# Patient Record
Sex: MALE | Race: WHITE | Employment: FULL TIME | URBAN - METROPOLITAN AREA
[De-identification: names, ages, dates, MRNs, and addresses within clinical notes are randomized per-mention and may not be internally consistent; named-entity substitution may affect disease eponyms.]

---

## 2020-03-24 ENCOUNTER — VIRTUAL VISIT (OUTPATIENT)
Dept: INTERNAL MEDICINE | Age: 50
End: 2020-03-24
Payer: COMMERCIAL

## 2020-03-24 VITALS — HEIGHT: 69 IN | BODY MASS INDEX: 30.51 KG/M2 | WEIGHT: 206 LBS

## 2020-03-24 PROBLEM — K76.89 OTHER CHRONIC NONALCOHOLIC LIVER DISEASE: Status: ACTIVE | Noted: 2020-03-24

## 2020-03-24 PROBLEM — G47.30 SLEEP APNEA: Status: ACTIVE | Noted: 2020-03-24

## 2020-03-24 PROBLEM — E66.9 OBESITY, CLASS I, BMI 30-34.9: Status: ACTIVE | Noted: 2018-04-10

## 2020-03-24 PROBLEM — J44.9 CHRONIC OBSTRUCTIVE AIRWAY DISEASE WITH ASTHMA (HCC): Status: ACTIVE | Noted: 2020-03-24

## 2020-03-24 PROBLEM — E78.2 MIXED HYPERLIPIDEMIA: Status: ACTIVE | Noted: 2020-03-24

## 2020-03-24 PROBLEM — K64.4 EXTERNAL HEMORRHOIDS: Status: ACTIVE | Noted: 2020-03-24

## 2020-03-24 PROBLEM — F33.0 MAJOR DEPRESSIVE DISORDER, RECURRENT EPISODE, MILD (HCC): Status: ACTIVE | Noted: 2020-03-24

## 2020-03-24 PROBLEM — I10 ESSENTIAL HYPERTENSION: Status: ACTIVE | Noted: 2020-03-24

## 2020-03-24 PROBLEM — R94.5 NONSPECIFIC ABNORMAL RESULTS OF LIVER FUNCTION STUDY: Status: ACTIVE | Noted: 2020-03-24

## 2020-03-24 PROCEDURE — 99443 PR PHYS/QHP TELEPHONE EVALUATION 21-30 MIN: CPT | Performed by: FAMILY MEDICINE

## 2020-03-24 RX ORDER — SELENIUM SULFIDE 2.5 MG/100ML
LOTION TOPICAL DAILY PRN
COMMUNITY
End: 2020-03-24 | Stop reason: SDUPTHER

## 2020-03-24 RX ORDER — SILDENAFIL 100 MG/1
100 TABLET, FILM COATED ORAL PRN
COMMUNITY
Start: 2019-03-28 | End: 2020-03-24 | Stop reason: SDUPTHER

## 2020-03-24 RX ORDER — SELENIUM SULFIDE 2.5 MG/100ML
LOTION TOPICAL DAILY PRN
Qty: 1 BOTTLE | Refills: 1 | Status: SHIPPED | OUTPATIENT
Start: 2020-03-24

## 2020-03-24 RX ORDER — VALACYCLOVIR HYDROCHLORIDE 1 G/1
1000 TABLET, FILM COATED ORAL 2 TIMES DAILY PRN
COMMUNITY

## 2020-03-24 RX ORDER — ALBUTEROL SULFATE 90 UG/1
AEROSOL, METERED RESPIRATORY (INHALATION)
COMMUNITY
Start: 2018-10-30 | End: 2020-07-13 | Stop reason: SDUPTHER

## 2020-03-24 RX ORDER — LISINOPRIL 5 MG/1
10 TABLET ORAL DAILY
COMMUNITY
Start: 2019-03-28 | End: 2020-03-24 | Stop reason: SDUPTHER

## 2020-03-24 RX ORDER — SILDENAFIL 100 MG/1
100 TABLET, FILM COATED ORAL PRN
Qty: 30 TABLET | Refills: 0 | Status: SHIPPED | OUTPATIENT
Start: 2020-03-24

## 2020-03-24 RX ORDER — LISINOPRIL 5 MG/1
10 TABLET ORAL DAILY
Qty: 180 TABLET | Refills: 0 | Status: SHIPPED | OUTPATIENT
Start: 2020-03-24

## 2020-03-24 NOTE — PATIENT INSTRUCTIONS
Hypertension / Hyperlipidemia Management    Blood Pressure Log      Tips to manage your condition    1. Keep your blood pressure below 130/80   Make sure your blood pressure is measured at every office visit    2. If you take antihypertensive drug therapy return for follow-up monthly until B/P goal is reached. 3. Follow-up with your physician to have your potassium and creatinine measured every 6 months. 4. Measure your blood pressure at home (use log to track your progress). 5. Keep your LDL (bad) cholesterol level below 130   Make sure your lipids are measured once a year     6. If you take LDL-lowering drug therapy return for follow-up every 6 months    Tips for healthy living    1. Start your day with breakfast  2. Exercise and slowly progress to (brisk walking, bike riding, etc) 30 minutes 3 to 5 days a week. 3. Snack in moderation (limit eating sugary or salty foods to no more than 3 times a week). 4. Eat more grains and vegetables (have no more than 3 servings of fruit daily). 5. Avoid tobacco use. 6. Drink alcohol in moderation (no more than 1 serving daily for woman and no more than 2 servings daily for men)  7. Obtain annual flu shot  8. Refer to patient education handouts given to you today. [facility] [place] Address Phone Website   [facility]  [address] [phone] [email or web address]       Weight Management Community Resources   Organization Address Phone Website   [facility] ([facility]) [address] [facility] [id number] [place] [address] [phone] n/a   Weight [employer] Multiple meeting locations throughout [address] [place] [address] [address] [email or web address]     Tops n/a n/a www. tops. [address], [facility] [address] [phone] [email or web address]    [facility] [address] [place] [address] [facility] [phone]

## 2020-03-24 NOTE — PROGRESS NOTES
connections     Talks on phone: Not on file     Gets together: Not on file     Attends [employer] service: Not on file     Active member of club or organization: Not on file     Attends meetings of clubs or organizations: Not on file     Relationship status: Not on file    Intimate partner violence     Fear of current or ex partner: Not on file     Emotionally abused: Not on file     Physically abused: Not on file     Forced sexual activity: Not on file   Other Topics Concern    Not on file   Social History Narrative    Not on file     Current Outpatient Medications on File Prior to Visit   Medication Sig Dispense Refill    albuterol sulfate  (90 Base) MCG/ACT inhaler USE 1-2 PUFFS EVERY 4 HOURS AS NEEDED      valACYclovir (VALTREX) 1 g tablet Take 1,000 mg by mouth 2 times daily as needed       No current facility-administered medications on file prior to visit. No Known Allergies    Chief Complaint   Patient presents with   [address]     previous pcp Dr. Mendez Heller    Diabetes     pt needs standing order for a1c and routing bw. HPI      Time spent with Patient: 15 minutes   Pt presents via telephonic contact due to COVID-19 pandemic emergency protocol. Patient was made aware they will be billed for telephone service. Patient is new to me  His last PCP retired  Needs to establish care    Diabetes: on metformin  HTN: lisinopril.  Home BP readings are 125/69, 140/86  HLD:not on statin   JAZZY: uses CPAP - needs an auto CPAP  Depression: not on any meds at this time  COPD:stable  NAFLD: stable  ED: stable  Seborrhea: stable    Problem list, PMHx, SHx, FHx, Medications: all reviewed and updated in EPIC    Current meds: reviewed & updated    He is scheduled for knee surgery with ortho   He has a torn meniscus     Review of Systems  Negative    Physical Exam  Patient was contacted over the phone  Patient is aware of today's time and date  Patient is aware of current pandemic situation

## 2020-04-23 ENCOUNTER — VIRTUAL VISIT (OUTPATIENT)
Dept: INTERNAL MEDICINE | Age: 50
End: 2020-04-23
Payer: COMMERCIAL

## 2020-04-23 ENCOUNTER — TELEPHONE (OUTPATIENT)
Dept: INTERNAL MEDICINE | Age: 50
End: 2020-04-23

## 2020-04-23 PROCEDURE — 99214 OFFICE O/P EST MOD 30 MIN: CPT | Performed by: FAMILY MEDICINE

## 2020-04-23 ASSESSMENT — ENCOUNTER SYMPTOMS
EYE ITCHING: 0
EYE DISCHARGE: 0
CHEST TIGHTNESS: 0
EYE PAIN: 0
APNEA: 0
CHOKING: 0

## 2020-04-23 NOTE — PROGRESS NOTES
compliant all of the time. Residual symptoms include: none. Home machine not working correctly    COPD:  Mild tobacco exposure  Lots of work exposure  He says his lungs are bad, also has hx of exercise induced asthma    ED  Stable  Doing well on medications  No med SE's    NAFLD  Stable  Due for labs    Seborrhea  Stable  Doing well on topical     Review of Systems   Constitutional: Negative for activity change, appetite change and chills. HENT: Negative for congestion, dental problem and drooling. Eyes: Negative for pain, discharge and itching. Respiratory: Negative for apnea, choking and chest tightness. Physical Exam  Nursing note reviewed. Constitutional:       General: He is not in acute distress. Appearance: Normal appearance. He is normal weight. He is not ill-appearing, toxic-appearing or diaphoretic. HENT:      Head: Normocephalic and atraumatic. Right Ear: External ear normal.      Left Ear: External ear normal.      Nose: Nose normal.   Eyes:      General: No scleral icterus. Right eye: No discharge. Left eye: No discharge. Extraocular Movements: Extraocular movements intact. Conjunctiva/sclera: Conjunctivae normal.   Neck:      Musculoskeletal: Normal range of motion. Pulmonary:      Effort: Pulmonary effort is normal.   Musculoskeletal: Normal range of motion. Neurological:      General: No focal deficit present. Mental Status: He is alert. Mental status is at baseline. Psychiatric:         Attention and Perception: Attention and perception normal.         Mood and Affect: Mood and affect normal.         Speech: Speech normal.         Behavior: Behavior normal. Behavior is cooperative. Thought Content:  Thought content normal.         Cognition and Memory: Memory normal.     Due to this being a TeleHealth encounter, evaluation of the following organ systems is limited:

## 2020-04-23 NOTE — TELEPHONE ENCOUNTER
Pt in need of new cpap machine, his old one is broken. He will call insurance to see if they require a certain DME company be used.

## 2020-07-13 RX ORDER — SITAGLIPTIN AND METFORMIN HYDROCHLORIDE 1000; 50 MG/1; MG/1
TABLET, FILM COATED ORAL
Qty: 90 TABLET | Refills: 1 | Status: SHIPPED | OUTPATIENT
Start: 2020-07-13

## 2020-07-13 RX ORDER — ALBUTEROL SULFATE 90 UG/1
AEROSOL, METERED RESPIRATORY (INHALATION)
Qty: 3 INHALER | Refills: 1 | Status: SHIPPED | OUTPATIENT
Start: 2020-07-13 | End: 2021-11-06 | Stop reason: SDUPTHER

## 2020-07-13 NOTE — TELEPHONE ENCOUNTER
requesting medication refill.  Please approve or deny this request.    Rx requested:  Requested Prescriptions     Pending Prescriptions Disp Refills    JANUMET  MG per tablet [Pharmacy Med Name: Velvet Mesarosia 50-1,000 MG TABLET] 90 tablet 1     Sig: take 1 tablet by mouth twice a day       Last Office Visit:   04/23/20    Last Labs:  04/23/20    Next Visit Date:  Future Appointments   Date Time Provider Sukhdev Levine   7/23/2020  1:00 PM Sherry Kaur MD [facility]

## 2020-07-13 NOTE — TELEPHONE ENCOUNTER
Requesting medication refill.  Please approve or deny this request.    Rx requested:  Requested Prescriptions     Pending Prescriptions Disp Refills    albuterol sulfate  (90 Base) MCG/ACT inhaler 1 Inhaler      Sig: USE 1-2 PUFFS EVERY 4 HOURS AS NEEDED       Last Office Visit:   4/23/20    Last Filled:  10/30/18    Last Labs:  4/15/2012    Next Visit Date:  Future Appointments   Date Time Provider Sukhdev Levine   7/23/2020  1:00 PM Emmy Loera MD [facility]

## 2020-07-22 RX ORDER — PIOGLITAZONEHYDROCHLORIDE 30 MG/1
30 TABLET ORAL DAILY
Qty: 90 TABLET | Refills: 0 | Status: SHIPPED | OUTPATIENT
Start: 2020-07-22

## 2020-07-22 NOTE — TELEPHONE ENCOUNTER
requesting medication refill. Please approve or deny this request.    Rx requested:  Requested Prescriptions     Pending Prescriptions Disp Refills    metFORMIN (GLUCOPHAGE) 500 MG tablet 180 tablet 1     Sig: Take 1 tablet by mouth 2 times daily (with meals)    pioglitazone (ACTOS) 30 MG tablet 90 tablet 1     Sig: Take 1 tablet by mouth daily       Last Office Visit:   04/23/20    Last Labs:      Next Visit Date:  No future appointments.         pt stated Janumet cost to much

## 2020-08-19 ENCOUNTER — TELEPHONE (OUTPATIENT)
Dept: INTERNAL MEDICINE | Age: 50
End: 2020-08-19

## 2021-11-06 ENCOUNTER — APPOINTMENT (OUTPATIENT)
Dept: CT IMAGING | Age: 51
End: 2021-11-06
Payer: COMMERCIAL

## 2021-11-06 ENCOUNTER — HOSPITAL ENCOUNTER (EMERGENCY)
Age: 51
Discharge: HOME | End: 2021-11-06
Attending: EMERGENCY MEDICINE
Payer: COMMERCIAL

## 2021-11-06 VITALS
DIASTOLIC BLOOD PRESSURE: 87 MMHG | HEART RATE: 108 BPM | SYSTOLIC BLOOD PRESSURE: 148 MMHG | RESPIRATION RATE: 20 BRPM | BODY MASS INDEX: 30.81 KG/M2 | OXYGEN SATURATION: 96 % | TEMPERATURE: 98 F | HEIGHT: 69 IN | WEIGHT: 208 LBS

## 2021-11-06 DIAGNOSIS — R79.89 ABNORMAL LFTS: ICD-10-CM

## 2021-11-06 DIAGNOSIS — J18.9 PNEUMONIA OF UPPER LOBE DUE TO INFECTIOUS ORGANISM, UNSPECIFIED LATERALITY: Primary | ICD-10-CM

## 2021-11-06 LAB
ALBUMIN SERPL-MCNC: 3.7 G/DL (ref 3.5–4.6)
ALP BLD-CCNC: 205 U/L (ref 35–104)
ALT SERPL-CCNC: 94 U/L (ref 0–41)
ANION GAP SERPL CALCULATED.3IONS-SCNC: 20 MEQ/L (ref 9–15)
APTT: 41.4 SEC (ref 24.4–36.8)
AST SERPL-CCNC: 96 U/L (ref 0–40)
BASOPHILS ABSOLUTE: 0 K/UL (ref 0–0.1)
BASOPHILS RELATIVE PERCENT: 0.6 % (ref 0.2–1.2)
BILIRUB SERPL-MCNC: 1 MG/DL (ref 0.2–0.7)
BILIRUBIN URINE: NEGATIVE
BLOOD, URINE: NEGATIVE
BUN BLDV-MCNC: 12 MG/DL (ref 6–20)
CALCIUM SERPL-MCNC: 9.8 MG/DL (ref 8.5–9.9)
CHLORIDE BLD-SCNC: 94 MEQ/L (ref 95–107)
CLARITY: CLEAR
CO2: 15 MEQ/L (ref 20–31)
COLOR: YELLOW
CREAT SERPL-MCNC: 0.83 MG/DL (ref 0.7–1.2)
EKG ATRIAL RATE: 116 BPM
EKG P AXIS: 63 DEGREES
EKG P-R INTERVAL: 156 MS
EKG Q-T INTERVAL: 356 MS
EKG QRS DURATION: 92 MS
EKG QTC CALCULATION (BAZETT): 494 MS
EKG R AXIS: 35 DEGREES
EKG T AXIS: 81 DEGREES
EKG VENTRICULAR RATE: 116 BPM
EOSINOPHILS ABSOLUTE: 0.1 K/UL (ref 0–0.5)
EOSINOPHILS RELATIVE PERCENT: 1.3 % (ref 0.8–7)
GFR AFRICAN AMERICAN: >60
GFR NON-AFRICAN AMERICAN: >60
GLOBULIN: 3.7 G/DL (ref 2.3–3.5)
GLUCOSE BLD-MCNC: 233 MG/DL (ref 70–99)
GLUCOSE URINE: 500 MG/DL
HCT VFR BLD CALC: 37.4 % (ref 42–52)
HEMOGLOBIN: 12.7 G/DL (ref 13.7–17.5)
IMMATURE GRANULOCYTES #: 0 K/UL
IMMATURE GRANULOCYTES %: 0.4 %
INR BLD: 1.4
KETONES, URINE: 15 MG/DL
LEUKOCYTE ESTERASE, URINE: NEGATIVE
LYMPHOCYTES ABSOLUTE: 1.7 K/UL (ref 1.3–3.6)
LYMPHOCYTES RELATIVE PERCENT: 30.3 %
MCH RBC QN AUTO: 30.2 PG (ref 25.7–32.2)
MCHC RBC AUTO-ENTMCNC: 34 % (ref 32.3–36.5)
MCV RBC AUTO: 88.8 FL (ref 79–92.2)
MONOCYTES ABSOLUTE: 0.5 K/UL (ref 0.3–0.8)
MONOCYTES RELATIVE PERCENT: 9.9 % (ref 5.3–12.2)
NEUTROPHILS ABSOLUTE: 3.1 K/UL (ref 1.8–5.4)
NEUTROPHILS RELATIVE PERCENT: 57.5 % (ref 34–67.9)
NITRITE, URINE: NEGATIVE
PDW BLD-RTO: 12.1 % (ref 11.6–14.4)
PH UA: 5 (ref 5–9)
PLATELET # BLD: 115 K/UL (ref 163–337)
POTASSIUM REFLEX MAGNESIUM: 4.2 MEQ/L (ref 3.4–4.9)
PROTEIN UA: NEGATIVE MG/DL
PROTHROMBIN TIME: 16.8 SEC (ref 12.3–14.9)
RBC # BLD: 4.21 M/UL (ref 4.63–6.08)
SARS-COV-2, NAAT: NOT DETECTED
SODIUM BLD-SCNC: 129 MEQ/L (ref 135–144)
SPECIFIC GRAVITY UA: 1.02 (ref 1–1.03)
TOTAL PROTEIN: 7.4 G/DL (ref 6.3–8)
TROPONIN: <0.01 NG/ML (ref 0–0.01)
UROBILINOGEN, URINE: 0.2 E.U./DL
WBC # BLD: 5.4 K/UL (ref 4.2–9)

## 2021-11-06 PROCEDURE — 99283 EMERGENCY DEPT VISIT LOW MDM: CPT

## 2021-11-06 PROCEDURE — 87635 SARS-COV-2 COVID-19 AMP PRB: CPT

## 2021-11-06 PROCEDURE — 84484 ASSAY OF TROPONIN QUANT: CPT

## 2021-11-06 PROCEDURE — 6360000002 HC RX W HCPCS: Performed by: EMERGENCY MEDICINE

## 2021-11-06 PROCEDURE — 6360000004 HC RX CONTRAST MEDICATION: Performed by: EMERGENCY MEDICINE

## 2021-11-06 PROCEDURE — 94640 AIRWAY INHALATION TREATMENT: CPT

## 2021-11-06 PROCEDURE — 85610 PROTHROMBIN TIME: CPT

## 2021-11-06 PROCEDURE — 87040 BLOOD CULTURE FOR BACTERIA: CPT

## 2021-11-06 PROCEDURE — 96366 THER/PROPH/DIAG IV INF ADDON: CPT

## 2021-11-06 PROCEDURE — 93005 ELECTROCARDIOGRAM TRACING: CPT

## 2021-11-06 PROCEDURE — 71275 CT ANGIOGRAPHY CHEST: CPT

## 2021-11-06 PROCEDURE — 85025 COMPLETE CBC W/AUTO DIFF WBC: CPT

## 2021-11-06 PROCEDURE — 80053 COMPREHEN METABOLIC PANEL: CPT

## 2021-11-06 PROCEDURE — 36415 COLL VENOUS BLD VENIPUNCTURE: CPT

## 2021-11-06 PROCEDURE — 2580000003 HC RX 258: Performed by: EMERGENCY MEDICINE

## 2021-11-06 PROCEDURE — 96365 THER/PROPH/DIAG IV INF INIT: CPT

## 2021-11-06 PROCEDURE — 93010 ELECTROCARDIOGRAM REPORT: CPT | Performed by: INTERNAL MEDICINE

## 2021-11-06 PROCEDURE — 81003 URINALYSIS AUTO W/O SCOPE: CPT

## 2021-11-06 PROCEDURE — 85730 THROMBOPLASTIN TIME PARTIAL: CPT

## 2021-11-06 RX ORDER — 0.9 % SODIUM CHLORIDE 0.9 %
1000 INTRAVENOUS SOLUTION INTRAVENOUS ONCE
Status: COMPLETED | OUTPATIENT
Start: 2021-11-06 | End: 2021-11-06

## 2021-11-06 RX ORDER — LEVOFLOXACIN 5 MG/ML
750 INJECTION, SOLUTION INTRAVENOUS ONCE
Status: COMPLETED | OUTPATIENT
Start: 2021-11-06 | End: 2021-11-06

## 2021-11-06 RX ORDER — ALBUTEROL SULFATE 90 UG/1
2 AEROSOL, METERED RESPIRATORY (INHALATION) 4 TIMES DAILY PRN
Qty: 54 G | Refills: 1 | Status: SHIPPED | OUTPATIENT
Start: 2021-11-06

## 2021-11-06 RX ORDER — ALBUTEROL SULFATE 2.5 MG/3ML
2.5 SOLUTION RESPIRATORY (INHALATION) ONCE
Status: COMPLETED | OUTPATIENT
Start: 2021-11-06 | End: 2021-11-06

## 2021-11-06 RX ORDER — LEVOFLOXACIN 750 MG/1
750 TABLET ORAL DAILY
Qty: 10 TABLET | Refills: 0 | Status: SHIPPED | OUTPATIENT
Start: 2021-11-06 | End: 2021-11-16

## 2021-11-06 RX ADMIN — LEVOFLOXACIN 750 MG: 5 INJECTION, SOLUTION INTRAVENOUS at 10:06

## 2021-11-06 RX ADMIN — ALBUTEROL SULFATE 2.5 MG: 2.5 SOLUTION RESPIRATORY (INHALATION) at 10:05

## 2021-11-06 RX ADMIN — IOPAMIDOL 100 ML: 755 INJECTION, SOLUTION INTRAVENOUS at 09:00

## 2021-11-06 RX ADMIN — SODIUM CHLORIDE 1000 ML: 9 INJECTION, SOLUTION INTRAVENOUS at 07:59

## 2021-11-06 NOTE — ED PROVIDER NOTES
eMERGENCY dEPARTMENT eNCOUnter      [address] [facility]    Chief Complaint   Patient presents with    Hemoptysis     C/O for the past couple months that comes and goes. Typically a dry cough but now coughing up blood. HPI    Judy Adames is a 46 y.o. male with history of diabetes, COPD, hypertension, depression, sleep apnea who presentsto ED from home  By private car  With complaint of cough, hemoptysis, chest burning  Onset 5 AM  Intensity of symptoms moderate  Patient has got history of intermittent cough going on for years. Patient started coughing earlier this morning and noticed some blood in the sputum. Sputum was about a spoonful which stopped. Patient does not take any anticoagulation. Patient is not a smoker he denies any fever chills. Patient is not Covid vaccinated. Patient had some left-sided chest burning with the cough. PAST MEDICAL HISTORY    Past Medical History:   Diagnosis Date    Tinea versicolor        SURGICAL HISTORY    History reviewed. No pertinent surgical history. CURRENT MEDICATIONS    Current Outpatient Rx   Medication Sig Dispense Refill    albuterol sulfate HFA (VENTOLIN HFA) 108 (90 Base) MCG/ACT inhaler Inhale 2 puffs into the lungs 4 times daily as needed for Wheezing 54 g 1    metFORMIN (GLUCOPHAGE) 500 MG tablet Take 1 tablet by mouth 2 times daily (with meals) 180 tablet 0    pioglitazone (ACTOS) 30 MG tablet Take 1 tablet by mouth daily 90 tablet 0    valACYclovir (VALTREX) 1 g tablet Take 1,000 mg by mouth 2 times daily as needed      sildenafil (VIAGRA) 100 MG tablet Take 1 tablet by mouth as needed for Erectile Dysfunction 30 tablet 0    JANUMET  MG per tablet take 1 tablet by mouth twice a day 90 tablet 1    lisinopril (PRINIVIL;ZESTRIL) 5 MG tablet Take 2 tablets by mouth daily 180 tablet 0    selenium sulfide (SELSUN) 2.5 % lotion Apply topically daily as needed for Itching Apply topically daily as needed.  1 Bottle 1       ALLERGIES    No Known Allergies    FAMILY HISTORY    History reviewed. No pertinent family history. SOCIAL HISTORY    Social History     Socioeconomic History    Marital status: Single     Spouse name: None    Number of children: None    Years of education: None    Highest education level: None   Occupational History    None   Tobacco Use    Smoking status: Former Smoker     Packs/day: 0.25     Years: 5.00     Pack years: 1.25     Types: Cigarettes    Smokeless tobacco: Former User   Vaping Use    Vaping Use: Never used   Substance and Sexual Activity    Alcohol use: Not Currently    Drug use: Never    Sexual activity: None   Other Topics Concern    None   Social History Narrative    None     Social Determinants of Health     Financial Resource Strain:     Difficulty of Paying Living Expenses: Not on file   Food Insecurity:     Worried About Running Out of Food in the Last Year: Not on file    Meenakshi of Food in the Last Year: Not on file   Transportation Needs:     Lack of Transportation (Medical): Not on file    Lack of Transportation (Non-Medical):  Not on file   Physical Activity:     Days of Exercise per Week: Not on file    Minutes of Exercise per Session: Not on file   Stress:     Feeling of Stress : Not on file   Social Connections:     Frequency of Communication with Friends and Family: Not on file    Frequency of Social Gatherings with Friends and Family: Not on file    Attends Congregational Services: Not on file    Active Member of Clubs or Organizations: Not on file    Attends Club or Organization Meetings: Not on file    Marital Status: Not on file   Intimate Partner Violence:     Fear of Current or Ex-Partner: Not on file    Emotionally Abused: Not on file    Physically Abused: Not on file    Sexually Abused: Not on file   Housing Stability:     Unable to Pay for Housing in the Last Year: Not on file    Number of [place] in the Last Year: Not on file    Unstable Housing in the Last Year: Not on file       REVIEW OF SYSTEMS    Constitutional:  Denies fever, chills, weight loss or weakness   Eyes:  Denies photophobia or discharge   HENT:  Denies sore throat or ear pain   Respiratory: Complains of cough but denies shortness of breath   Cardiovascular: Complains of chest burning and, palpitations but denies swelling   GI:  Denies abdominal pain, nausea, vomiting, or diarrhea   Musculoskeletal:  Denies back pain   Skin:  Denies rash   Neurologic:  Denies headache, focal weakness or sensory changes   Endocrine:  Denies polyuria or polydypsia   Lymphatic:  Denies swollen glands   Psychiatric:  Denies depression, suicidal ideation or homicidal ideation   All systems negative except as marked. PHYSICAL EXAM    VITAL SIGNS: BP (!) 148/87   Pulse 108   Temp 98 °F (36.7 °C) (Oral)   Resp 20   Ht 5' 9\" (1.753 m)   Wt 208 lb (94.3 kg)   SpO2 96%   BMI 30.72 kg/m²    Constitutional: Morbidly obese, No acute distress, Non-toxic appearance. HENT:  Normocephalic, Atraumatic, Bilateral external ears normal, Oropharynx moist, No oral exudates, Nose normal. Neck- Normal range of motion, No tenderness, Supple, No stridor. Eyes:  PERRL, EOMI, Conjunctiva normal, No discharge. Respiratory:  Normal breath sounds, mild respiratory distress, mild diffuse wheezing, No chest tenderness. Cardiovascular: Tachycardic, normal rhythm, No murmurs, No rubs, No gallops. GI:  Bowel sounds normal, Soft, No tenderness, No masses, No pulsatile masses. : No CVA tenderness. Musculoskeletal:  Intact distal pulses, No edema, No tenderness, No cyanosis, No clubbing. Good range of motion in all major joints. No tenderness to palpation or major deformities noted. Back- No tenderness. Integument:  Warm, Dry, No erythema, No rash. Lymphatic:  No lymphadenopathy noted. Neurologic:  Alert & oriented x 3, Normal motor function, Normal sensory function, No focal deficits noted.    Psychiatric:  Affect anxious, Judgment normal, Mood normal.     EKG    Sinus Tachycardia,  , Normal Axis, Non specificT wave changes, QTc 494    RADIOLOGY    CTA CHEST W WO CONTRAST - r/o Pulmonary Embolism   Final Result      1. No evidence for pulmonary embolism. 2.Bilateral upper zone predominant airspace opacities, suggestive of multifocal pneumonia, including viral etiologies. REEVALUATION   Patient was updated the results of labs and Radiology.     Note  Labs  Labs Reviewed   CBC WITH AUTO DIFFERENTIAL - Abnormal; Notable for the following components:       Result Value    RBC 4.21 (*)     Hemoglobin 12.7 (*)     Hematocrit 37.4 (*)     Platelets 199 (*)     All other components within normal limits   COMPREHENSIVE METABOLIC PANEL W/ REFLEX TO MG FOR LOW K - Abnormal; Notable for the following components:    Sodium 129 (*)     Chloride 94 (*)     CO2 15 (*)     Anion Gap 20 (*)     Glucose 233 (*)     Total Bilirubin 1.0 (*)     Alkaline Phosphatase 205 (*)     ALT 94 (*)     AST 96 (*)     Globulin 3.7 (*)     All other components within normal limits   PROTIME-INR - Abnormal; Notable for the following components:    Protime 16.8 (*)     All other components within normal limits   APTT - Abnormal; Notable for the following components:    aPTT 41.4 (*)     All other components within normal limits   URINALYSIS - Abnormal; Notable for the following components:    Glucose, Ur 500 (*)     Ketones, Urine 15 (*)     All other components within normal limits   COVID-19, RAPID   CULTURE, BLOOD 1    Narrative:     ORDER#: [id number]                          ORDERED BY: ANN MIN  SOURCE: Blood                              COLLECTED:  11/06/21 08:09  ANTIBIOTICS AT AILEEN.:                      RECEIVED :  11/06/21 14:22   CULTURE, BLOOD 2    Narrative:     ORDER#: [id number]                          ORDERED BY: Libby Burnett  SOURCE: Blood                              COLLECTED:  11/06/21 08:09  ANTIBIOTICS AT AILEEN.: RECEIVED :  11/06/21 14:20   TROPONIN             Summation      Patient Course:     ED Medications administered this visit:    Medications   0.9 % sodium chloride bolus (0 mLs IntraVENous Stopped 11/6/21 0919)   albuterol (PROVENTIL) nebulizer solution 2.5 mg (2.5 mg Nebulization Given 11/6/21 1005)   iopamidol (ISOVUE-370) 76 % injection 100 mL (100 mLs IntraVENous Given 11/6/21 0900)   levoFLOXacin (LEVAQUIN) 750 MG/150ML infusion 750 mg (0 mg IntraVENous Stopped 11/6/21 1145)       New Prescriptions from this visit:    Discharge Medication List as of 11/6/2021 10:05 AM      START taking these medications    Details   levoFLOXacin (LEVAQUIN) 750 MG tablet Take 1 tablet by mouth daily for 10 days, Disp-10 tablet, R-0Print      albuterol sulfate HFA (VENTOLIN HFA) 108 (90 Base) MCG/ACT inhaler Inhale 2 puffs into the lungs 4 times daily as needed for Wheezing, Disp-54 g, R-1Print             Follow-up:  Khushbu Jc MD  [id number] [phone]    Call in 1 day      Sarkis Connors MD  [address] [address]  [address] [facility]  [phone]    Call in 1 day          Final Impression:   1. Pneumonia of upper lobe due to infectious organism, unspecified laterality    2.  Abnormal LFTs               (Please note that portions of this note were completed with a voice recognition program.  Efforts were made to edit the dictations but occasionally words are mis-transcribed.)          Key Adrian MD  11/17/21 [id number]

## 2021-11-11 LAB
BLOOD CULTURE, ROUTINE: NORMAL
CULTURE, BLOOD 2: NORMAL

## 2022-07-31 ENCOUNTER — OFFICE VISIT (OUTPATIENT)
Dept: INTERNAL MEDICINE | Age: 52
End: 2022-07-31
Payer: COMMERCIAL

## 2022-07-31 VITALS
WEIGHT: 206 LBS | TEMPERATURE: 98.4 F | HEART RATE: 91 BPM | BODY MASS INDEX: 30.51 KG/M2 | HEIGHT: 69 IN | OXYGEN SATURATION: 98 % | SYSTOLIC BLOOD PRESSURE: 118 MMHG | DIASTOLIC BLOOD PRESSURE: 68 MMHG

## 2022-07-31 DIAGNOSIS — H10.9 CONJUNCTIVITIS OF RIGHT EYE, UNSPECIFIED CONJUNCTIVITIS TYPE: Primary | ICD-10-CM

## 2022-07-31 PROCEDURE — 99213 OFFICE O/P EST LOW 20 MIN: CPT | Performed by: NURSE PRACTITIONER

## 2022-07-31 SDOH — ECONOMIC STABILITY: FOOD INSECURITY: WITHIN THE PAST 12 MONTHS, THE FOOD YOU BOUGHT JUST DIDN'T LAST AND YOU DIDN'T HAVE MONEY TO GET MORE.: NEVER TRUE

## 2022-07-31 SDOH — ECONOMIC STABILITY: FOOD INSECURITY: WITHIN THE PAST 12 MONTHS, YOU WORRIED THAT YOUR FOOD WOULD RUN OUT BEFORE YOU GOT MONEY TO BUY MORE.: NEVER TRUE

## 2022-07-31 ASSESSMENT — PATIENT HEALTH QUESTIONNAIRE - PHQ9
3. TROUBLE FALLING OR STAYING ASLEEP: 0
10. IF YOU CHECKED OFF ANY PROBLEMS, HOW DIFFICULT HAVE THESE PROBLEMS MADE IT FOR YOU TO DO YOUR WORK, TAKE CARE OF THINGS AT HOME, OR GET ALONG WITH OTHER PEOPLE: 0
9. THOUGHTS THAT YOU WOULD BE BETTER OFF DEAD, OR OF HURTING YOURSELF: 0
2. FEELING DOWN, DEPRESSED OR HOPELESS: 0
SUM OF ALL RESPONSES TO PHQ QUESTIONS 1-9: 0
SUM OF ALL RESPONSES TO PHQ QUESTIONS 1-9: 0
1. LITTLE INTEREST OR PLEASURE IN DOING THINGS: 0
6. FEELING BAD ABOUT YOURSELF - OR THAT YOU ARE A FAILURE OR HAVE LET YOURSELF OR YOUR FAMILY DOWN: 0
4. FEELING TIRED OR HAVING LITTLE ENERGY: 0
8. MOVING OR SPEAKING SO SLOWLY THAT OTHER PEOPLE COULD HAVE NOTICED. OR THE OPPOSITE, BEING SO FIGETY OR RESTLESS THAT YOU HAVE BEEN MOVING AROUND A LOT MORE THAN USUAL: 0
SUM OF ALL RESPONSES TO PHQ9 QUESTIONS 1 & 2: 0
7. TROUBLE CONCENTRATING ON THINGS, SUCH AS READING THE NEWSPAPER OR WATCHING TELEVISION: 0
SUM OF ALL RESPONSES TO PHQ QUESTIONS 1-9: 0
SUM OF ALL RESPONSES TO PHQ QUESTIONS 1-9: 0
5. POOR APPETITE OR OVEREATING: 0

## 2022-07-31 ASSESSMENT — ENCOUNTER SYMPTOMS
EYE REDNESS: 1
PHOTOPHOBIA: 1
WHEEZING: 0
EYE DISCHARGE: 1
SINUS PRESSURE: 0
EYE PAIN: 1
SHORTNESS OF BREATH: 0

## 2022-07-31 ASSESSMENT — SOCIAL DETERMINANTS OF HEALTH (SDOH): HOW HARD IS IT FOR YOU TO PAY FOR THE VERY BASICS LIKE FOOD, HOUSING, MEDICAL CARE, AND HEATING?: NOT HARD AT ALL

## 2022-07-31 NOTE — PROGRESS NOTES
Amanda Patel (:  1970) is a 46 y.o. male, Established patient, here for evaluation of the following chief complaint(s): Other (Right pink eye)      Vitals:    22 1326   BP: 118/68   Pulse: 91   Temp: 98.4 °F (36.9 °C)   SpO2: 98%       ASSESSMENT/PLAN:  1. Conjunctivitis of right eye, unspecified conjunctivitis type        -   continue with abx PCP gave        -   Avoid hot water shower to the eye        -   Cool compress to the eye 2-3x daily        -   Keep Wednesday appt with PCP         -   Gentle wash to the eye with mild soap or plain cool water    Return in about 1 week (around 2022) for follow up with PCP. SUBJECTIVE/OBJECTIVE:    Conjunctivitis   The current episode started 2 days ago (pt woke up with yellow crusts with some mucus in the right eye a few days ago. tried old Rx of Lotemax without relief, has been letting warm/hot water run in it in the shower with temp soothes eye. PCP gave him abx Sulfacetamine gtts, has used only 1 day). The onset was sudden. Associated symptoms include photophobia, eye discharge, eye pain (minimal) and eye redness (severe with blood). Pertinent negatives include no ear pain and no wheezing. The eye pain is mild. The right eye is affected. The eye pain is not associated with movement. The eyelid exhibits swelling and redness. Review of Systems   HENT:  Negative for ear pain and sinus pressure. Eyes:  Positive for photophobia, pain (minimal), discharge and redness (severe with blood). Negative for visual disturbance. Respiratory:  Negative for shortness of breath and wheezing. Cardiovascular:  Negative for palpitations. Neurological:  Negative for dizziness and light-headedness. Physical Exam  Vitals reviewed. Constitutional:       General: He is not in acute distress. Appearance: Normal appearance.    HENT:      Right Ear: Tympanic membrane normal.      Left Ear: Tympanic membrane normal.      Nose: Nose normal. Mouth/Throat:      Lips: Pink. Mouth: Mucous membranes are moist.   Eyes:      General: Lids are normal.         Left eye: Discharge present. Extraocular Movements: Extraocular movements intact. Conjunctiva/sclera:      Left eye: Left conjunctiva is injected (with serosanguinous fluid noted inside the lower eyelid). Pupils: Pupils are equal, round, and reactive to light. Cardiovascular:      Rate and Rhythm: Normal rate and regular rhythm. Heart sounds: Normal heart sounds, S1 normal and S2 normal.   Pulmonary:      Effort: Pulmonary effort is normal. No respiratory distress. Breath sounds: Normal air entry. Skin:     General: Skin is warm and dry. Neurological:      Mental Status: He is alert and oriented to person, place, and time. Psychiatric:         Behavior: Behavior is cooperative. An electronic signature was used to authenticate this note.     --CARLI Samson

## 2023-03-27 DIAGNOSIS — E11.9 DIABETES MELLITUS WITHOUT COMPLICATION (MULTI): ICD-10-CM

## 2023-03-27 RX ORDER — BLOOD SUGAR DIAGNOSTIC
60 STRIP MISCELLANEOUS
COMMUNITY
Start: 2020-07-13 | End: 2023-03-27 | Stop reason: SDUPTHER

## 2023-03-27 RX ORDER — BLOOD SUGAR DIAGNOSTIC
60 STRIP MISCELLANEOUS
Qty: 200 STRIP | Refills: 3 | Status: SHIPPED | OUTPATIENT
Start: 2023-03-27 | End: 2023-10-13 | Stop reason: ALTCHOICE

## 2023-04-28 ENCOUNTER — OFFICE VISIT (OUTPATIENT)
Dept: PRIMARY CARE | Facility: CLINIC | Age: 53
End: 2023-04-28
Payer: COMMERCIAL

## 2023-04-28 VITALS — BODY MASS INDEX: 30.66 KG/M2 | TEMPERATURE: 96.5 F | WEIGHT: 207 LBS | HEIGHT: 69 IN

## 2023-04-28 DIAGNOSIS — I25.10 ATHEROSCLEROSIS OF NATIVE CORONARY ARTERY OF NATIVE HEART WITHOUT ANGINA PECTORIS: Primary | ICD-10-CM

## 2023-04-28 DIAGNOSIS — I10 ESSENTIAL HYPERTENSION: ICD-10-CM

## 2023-04-28 DIAGNOSIS — D50.0 IRON DEFICIENCY ANEMIA DUE TO CHRONIC BLOOD LOSS: ICD-10-CM

## 2023-04-28 DIAGNOSIS — K74.69 OTHER CIRRHOSIS OF LIVER (MULTI): ICD-10-CM

## 2023-04-28 DIAGNOSIS — E11.9 CONTROLLED TYPE 2 DIABETES MELLITUS WITHOUT COMPLICATION, WITHOUT LONG-TERM CURRENT USE OF INSULIN (MULTI): ICD-10-CM

## 2023-04-28 DIAGNOSIS — I85.11 SECONDARY ESOPHAGEAL VARICES WITH BLEEDING (MULTI): ICD-10-CM

## 2023-04-28 PROBLEM — K74.60 CIRRHOSIS OF LIVER (MULTI): Status: ACTIVE | Noted: 2023-04-28

## 2023-04-28 PROBLEM — I85.00 ESOPHAGEAL VARICES (MULTI): Status: ACTIVE | Noted: 2023-04-28

## 2023-04-28 PROBLEM — K76.6 PORTAL HYPERTENSION (MULTI): Status: ACTIVE | Noted: 2023-04-24

## 2023-04-28 PROBLEM — K76.0 NAFLD (NONALCOHOLIC FATTY LIVER DISEASE): Status: ACTIVE | Noted: 2023-04-28

## 2023-04-28 PROBLEM — G47.33 OSA ON CPAP: Status: ACTIVE | Noted: 2023-04-28

## 2023-04-28 PROBLEM — D64.9 ANEMIA: Status: ACTIVE | Noted: 2023-04-23

## 2023-04-28 PROBLEM — F51.04 CHRONIC INSOMNIA: Status: ACTIVE | Noted: 2023-04-28

## 2023-04-28 PROBLEM — E03.9 HYPOTHYROIDISM: Status: ACTIVE | Noted: 2023-04-13

## 2023-04-28 PROBLEM — E78.2 MIXED HYPERLIPIDEMIA: Status: ACTIVE | Noted: 2020-03-24

## 2023-04-28 PROCEDURE — 1036F TOBACCO NON-USER: CPT | Performed by: INTERNAL MEDICINE

## 2023-04-28 PROCEDURE — 99214 OFFICE O/P EST MOD 30 MIN: CPT | Performed by: INTERNAL MEDICINE

## 2023-04-28 PROCEDURE — 3008F BODY MASS INDEX DOCD: CPT | Performed by: INTERNAL MEDICINE

## 2023-04-28 RX ORDER — NITROGLYCERIN 0.4 MG/1
TABLET SUBLINGUAL
COMMUNITY
Start: 2021-12-10

## 2023-04-28 RX ORDER — SPIRONOLACTONE 100 MG/1
100 TABLET, FILM COATED ORAL
COMMUNITY
Start: 2023-04-26 | End: 2023-05-30 | Stop reason: SDUPTHER

## 2023-04-28 RX ORDER — DOXEPIN HYDROCHLORIDE 10 MG/1
CAPSULE ORAL
COMMUNITY
Start: 2022-08-03 | End: 2023-10-13 | Stop reason: ALTCHOICE

## 2023-04-28 RX ORDER — METFORMIN HYDROCHLORIDE 1000 MG/1
1000 TABLET ORAL 2 TIMES DAILY
COMMUNITY
Start: 2019-10-29 | End: 2023-05-12 | Stop reason: SDUPTHER

## 2023-04-28 RX ORDER — FUROSEMIDE 40 MG/1
40 TABLET ORAL
COMMUNITY
Start: 2023-04-26 | End: 2023-05-30 | Stop reason: ALTCHOICE

## 2023-04-28 RX ORDER — LISINOPRIL 5 MG/1
2 TABLET ORAL DAILY
COMMUNITY
Start: 2020-03-24 | End: 2023-04-28 | Stop reason: ALTCHOICE

## 2023-04-28 RX ORDER — ATORVASTATIN CALCIUM 20 MG/1
20 TABLET, FILM COATED ORAL
COMMUNITY
Start: 2021-12-07 | End: 2024-03-29 | Stop reason: SDUPTHER

## 2023-04-28 RX ORDER — PANTOPRAZOLE SODIUM 40 MG/1
40 TABLET, DELAYED RELEASE ORAL
COMMUNITY
Start: 2023-04-07 | End: 2023-10-13 | Stop reason: ALTCHOICE

## 2023-04-28 RX ORDER — BLOOD SUGAR DIAGNOSTIC
1 STRIP MISCELLANEOUS 2 TIMES DAILY
COMMUNITY
Start: 2020-07-13 | End: 2023-07-12

## 2023-04-28 RX ORDER — VIT C/E/ZN/COPPR/LUTEIN/ZEAXAN 250MG-90MG
1000 CAPSULE ORAL
COMMUNITY
Start: 2016-01-12 | End: 2023-10-13 | Stop reason: ALTCHOICE

## 2023-04-28 RX ORDER — PIOGLITAZONEHYDROCHLORIDE 15 MG/1
1 TABLET ORAL DAILY
COMMUNITY
Start: 2021-01-22 | End: 2023-08-15 | Stop reason: ALTCHOICE

## 2023-04-28 RX ORDER — ALBUTEROL SULFATE 90 UG/1
AEROSOL, METERED RESPIRATORY (INHALATION)
COMMUNITY
Start: 2020-07-13

## 2023-04-28 RX ORDER — INSULIN DEGLUDEC 100 U/ML
INJECTION, SOLUTION SUBCUTANEOUS
COMMUNITY
Start: 2022-09-09 | End: 2023-11-27 | Stop reason: ALTCHOICE

## 2023-04-28 RX ORDER — PROPRANOLOL HYDROCHLORIDE 10 MG/1
10 TABLET ORAL 3 TIMES DAILY
COMMUNITY
Start: 2023-04-26 | End: 2023-10-13 | Stop reason: ALTCHOICE

## 2023-04-28 RX ORDER — TRAZODONE HYDROCHLORIDE 50 MG/1
50 TABLET ORAL DAILY PRN
COMMUNITY
Start: 2023-01-03 | End: 2023-10-13 | Stop reason: ALTCHOICE

## 2023-04-28 RX ORDER — LEVOTHYROXINE SODIUM 50 UG/1
TABLET ORAL
COMMUNITY
Start: 2020-12-31 | End: 2023-09-13 | Stop reason: SDUPTHER

## 2023-04-28 ASSESSMENT — ENCOUNTER SYMPTOMS
BRUISES/BLEEDS EASILY: 0
NEUROLOGICAL NEGATIVE: 1
ABDOMINAL DISTENTION: 1
FATIGUE: 1
LEG SWELLING: 1
PARESTHESIAS: 0
HEMATOCHEZIA: 1
FEVER: 0
ABDOMINAL PAIN: 0
RESPIRATORY NEGATIVE: 1
WEIGHT LOSS: 0
ARTHRALGIAS: 1
HEMATEMESIS: 0
EYES NEGATIVE: 1
ANOREXIA: 0
COLOR CHANGE: 1
LIGHT-HEADEDNESS: 0
CONFUSION: 0

## 2023-04-28 NOTE — PROGRESS NOTES
Subjective   Patient ID: Walker Cuenca IV is a 52 y.o. male who presents for Follow-up ([facility] follow up).    Anemia  Presents for follow-up visit. Symptoms include leg swelling. There has been no abdominal pain, anorexia, bruising/bleeding easily, confusion, fever, light-headedness, malaise/fatigue, paresthesias or weight loss. Signs of blood loss that are present include hematochezia and melena. Signs of blood loss that are not present include hematemesis. Compliance problems include adherence to diet.  Compliance with medications is %. Compliance with diet is %.   Heart Problem  This is a chronic problem. The current episode started more than 1 year ago. The problem occurs intermittently. The problem has been unchanged. Associated symptoms include arthralgias and fatigue. Pertinent negatives include no abdominal pain, anorexia or fever. The treatment provided mild relief.    Cirrhosis  Patient presents with tarry stools.  Onset of symptoms was gradual starting 10 days ago with gradually improving course since that time. Patient also notes  none, none diffusely discomfort. Symptoms improve with none. Symptoms worsen with  none . There is history of  none . There is no history of  PRESCOTT . Past history includes ascites and cirrhosis. Previous studies include upper endoscopy.      Review of Systems   Constitutional:  Positive for fatigue. Negative for fever, malaise/fatigue and weight loss.   HENT: Negative.     Eyes: Negative.    Respiratory: Negative.     Gastrointestinal:  Positive for abdominal distention, hematochezia and melena. Negative for abdominal pain, anorexia and hematemesis.   Musculoskeletal:  Positive for arthralgias.   Skin:  Positive for color change.   Neurological: Negative.  Negative for light-headedness and paresthesias.   Hematological:  Does not bruise/bleed easily.   Psychiatric/Behavioral:  Negative for confusion.        Objective   Temp 35.8 °C (96.5 °F) (Temporal)   Ht 1.753 m (5'  "9\")   Wt 93.9 kg (207 lb)   BMI 30.57 kg/m²     Physical Exam  Vitals reviewed.   Constitutional:       Appearance: Normal appearance. He is not ill-appearing.   HENT:      Head: Normocephalic and atraumatic.   Eyes:      Conjunctiva/sclera: Conjunctivae normal.      Comments: Pale   Cardiovascular:      Rate and Rhythm: Normal rate and regular rhythm.      Pulses: Normal pulses.   Pulmonary:      Effort: Pulmonary effort is normal.      Breath sounds: Normal breath sounds.   Abdominal:      General: There is distension.      Palpations: Abdomen is soft.      Tenderness: There is no abdominal tenderness.   Musculoskeletal:         General: Normal range of motion.      Cervical back: Normal range of motion.      Right lower leg: Edema present.      Left lower leg: Edema present.   Skin:     General: Skin is warm and dry.   Neurological:      General: No focal deficit present.   Psychiatric:         Mood and Affect: Mood normal.         Assessment/Plan   Problem List Items Addressed This Visit          Circulatory    Atherosclerotic heart disease of native coronary artery without angina pectoris - Primary    Relevant Medications    Nitrostat 0.4 mg SL tablet    propranolol (Inderal) 10 mg tablet    Esophageal varices (CMS/HCC)    Essential hypertension       Digestive    Cirrhosis of liver (CMS/HCC)       Endocrine/Metabolic    Controlled type 2 diabetes mellitus without complication, without long-term current use of insulin (CMS/HCC)       Hematologic    Anemia   Pt does not have any angina lately, aware of using NTG if needed, aware to notify MD if any new chest pains happens. Compliance is appropriate. Statin therapy reviewed,    Diabetes is chronic disease, it does not get cured but it can be controlled, in modern medicine there are variety of measures taken to control DM. Usually we want to preserve beta cell functions. Please understand the disease and how our life style can affect control of glycemia. " Diabetes leads to macro and microvascular complications and they could be devastating. It is important to have annual eye check and frequent foot inspection and foot inspection. Kidney dysfunction including dialysis, foot amputations, neuropathy, foot ulcers and accelerated atherosclerotic vascular disease are known complications of uncontrolled DM, pt was educated and explained.   Recent hospitalization data and information reviewed, all reports, labs, radiological investigations and consultations and follow ups reviewed during this visit. Pt is doing well, precautions to be taken in the future for acute ailments or acute illness and change of condition are discussed, will continue to have close follow up, medication list was reviewed and updated and necessary changes were applied.    He has had 2 hospitalizations, first time was for melena from esophageal bleeding, second time he was having intractable nausea and vomiting and it was secondary to Ozempic causing delayed gastric transit time and delayed gastric emptying, Ozempic has been taken off, lisinopril has been taken off, sodium level was low, it improved, he has a decompensated liver disease, he needs to be seen by hepatology quite sooner, he is a liver problem needs to be attended, we will contact the liver clinic, if needed would communicate with physician.  He is struggling, his blood sugars are still fluctuating, his hemoglobin was around 8, his creatinine improved, he was admitted at [facility] and reports were reviewed, we went through all the medications, we scrutinized his medication list, still slightly lower on his BP readings, but propranolol has been kept on a lower dosage, he is trying his best to keep up with his physical health but liver disease is not letting him to happen, he just have a paracentesis done so another paracentesis could not be done and I do not see that much of a cyclic fluid that he needs paracentesis, blood sugars are  reviewed, endocrine follow-up was reviewed, all other hospitalization data and information were reviewed, follow-up in 2 months.  No angina.

## 2023-05-10 ENCOUNTER — HOSPITAL ENCOUNTER (OUTPATIENT)
Dept: DATA CONVERSION | Facility: HOSPITAL | Age: 53
End: 2023-05-10
Attending: INTERNAL MEDICINE | Admitting: INTERNAL MEDICINE
Payer: COMMERCIAL

## 2023-05-10 DIAGNOSIS — R18.8 OTHER ASCITES: ICD-10-CM

## 2023-05-12 ENCOUNTER — TELEPHONE (OUTPATIENT)
Dept: PRIMARY CARE | Facility: CLINIC | Age: 53
End: 2023-05-12
Payer: COMMERCIAL

## 2023-05-12 DIAGNOSIS — I10 ESSENTIAL HYPERTENSION: ICD-10-CM

## 2023-05-12 DIAGNOSIS — E03.9 HYPOTHYROIDISM, UNSPECIFIED TYPE: ICD-10-CM

## 2023-05-12 DIAGNOSIS — E03.9 ACQUIRED HYPOTHYROIDISM: Primary | ICD-10-CM

## 2023-05-12 DIAGNOSIS — E11.9 CONTROLLED TYPE 2 DIABETES MELLITUS WITHOUT COMPLICATION, WITHOUT LONG-TERM CURRENT USE OF INSULIN (MULTI): ICD-10-CM

## 2023-05-12 RX ORDER — LEVOTHYROXINE SODIUM 50 UG/1
50 TABLET ORAL DAILY
Qty: 90 TABLET | Refills: 3 | OUTPATIENT
Start: 2023-05-12 | End: 2024-05-11

## 2023-05-12 RX ORDER — METFORMIN HYDROCHLORIDE 1000 MG/1
1000 TABLET ORAL
Qty: 180 TABLET | Refills: 3 | Status: SHIPPED | OUTPATIENT
Start: 2023-05-12 | End: 2023-06-30 | Stop reason: DRUGHIGH

## 2023-05-12 RX ORDER — METFORMIN HYDROCHLORIDE 1000 MG/1
TABLET ORAL
Qty: 180 TABLET | Refills: 3 | Status: CANCELLED | OUTPATIENT
Start: 2023-05-12

## 2023-05-15 LAB
ALANINE AMINOTRANSFERASE (SGPT) (U/L) IN SER/PLAS: 40 U/L (ref 10–52)
ALBUMIN (G/DL) IN SER/PLAS: 3.3 G/DL (ref 3.4–5)
ALKALINE PHOSPHATASE (U/L) IN SER/PLAS: 266 U/L (ref 33–120)
ALPHA-1 FETOPROTEIN (NG/ML) IN SER/PLAS: <4 NG/ML (ref 0–9)
ANION GAP IN SER/PLAS: 13 MMOL/L (ref 10–20)
APPEARANCE, URINE: CLEAR
ASPARTATE AMINOTRANSFERASE (SGOT) (U/L) IN SER/PLAS: 60 U/L (ref 9–39)
BASOPHILS (10*3/UL) IN BLOOD BY AUTOMATED COUNT: 0.06 X10E9/L (ref 0–0.1)
BASOPHILS/100 LEUKOCYTES IN BLOOD BY AUTOMATED COUNT: 1.2 % (ref 0–2)
BILIRUBIN DIRECT (MG/DL) IN SER/PLAS: 0.4 MG/DL (ref 0–0.3)
BILIRUBIN TOTAL (MG/DL) IN SER/PLAS: 1.3 MG/DL (ref 0–1.2)
BILIRUBIN, URINE: NEGATIVE
BLOOD, URINE: NEGATIVE
CALCIUM (MG/DL) IN SER/PLAS: 8.8 MG/DL (ref 8.6–10.3)
CARBON DIOXIDE, TOTAL (MMOL/L) IN SER/PLAS: 24 MMOL/L (ref 21–32)
CHLORIDE (MMOL/L) IN SER/PLAS: 97 MMOL/L (ref 98–107)
COLOR, URINE: YELLOW
CREATININE (MG/DL) IN SER/PLAS: 1.35 MG/DL (ref 0.5–1.3)
CREATININE (MG/DL) IN URINE: 154 MG/DL (ref 20–370)
EOSINOPHILS (10*3/UL) IN BLOOD BY AUTOMATED COUNT: 0.17 X10E9/L (ref 0–0.7)
EOSINOPHILS/100 LEUKOCYTES IN BLOOD BY AUTOMATED COUNT: 3.4 % (ref 0–6)
ERYTHROCYTE DISTRIBUTION WIDTH (RATIO) BY AUTOMATED COUNT: 14.7 % (ref 11.5–14.5)
ERYTHROCYTE MEAN CORPUSCULAR HEMOGLOBIN CONCENTRATION (G/DL) BY AUTOMATED: 30.7 G/DL (ref 32–36)
ERYTHROCYTE MEAN CORPUSCULAR VOLUME (FL) BY AUTOMATED COUNT: 95 FL (ref 80–100)
ERYTHROCYTES (10*6/UL) IN BLOOD BY AUTOMATED COUNT: 2.78 X10E12/L (ref 4.5–5.9)
GFR MALE: 62 ML/MIN/1.73M2
GLUCOSE (MG/DL) IN SER/PLAS: 253 MG/DL (ref 74–99)
GLUCOSE, URINE: ABNORMAL MG/DL
HEMATOCRIT (%) IN BLOOD BY AUTOMATED COUNT: 26.4 % (ref 41–52)
HEMOGLOBIN (G/DL) IN BLOOD: 8.1 G/DL (ref 13.5–17.5)
IMMATURE GRANULOCYTES/100 LEUKOCYTES IN BLOOD BY AUTOMATED COUNT: 0.4 % (ref 0–0.9)
INR IN PPP BY COAGULATION ASSAY: 1.5 (ref 0.9–1.1)
KETONES, URINE: NEGATIVE MG/DL
LEUKOCYTE ESTERASE, URINE: NEGATIVE
LEUKOCYTES (10*3/UL) IN BLOOD BY AUTOMATED COUNT: 5.1 X10E9/L (ref 4.4–11.3)
LYMPHOCYTES (10*3/UL) IN BLOOD BY AUTOMATED COUNT: 1.2 X10E9/L (ref 1.2–4.8)
LYMPHOCYTES/100 LEUKOCYTES IN BLOOD BY AUTOMATED COUNT: 23.7 % (ref 13–44)
MONOCYTES (10*3/UL) IN BLOOD BY AUTOMATED COUNT: 0.6 X10E9/L (ref 0.1–1)
MONOCYTES/100 LEUKOCYTES IN BLOOD BY AUTOMATED COUNT: 11.9 % (ref 2–10)
NEUTROPHILS (10*3/UL) IN BLOOD BY AUTOMATED COUNT: 3.01 X10E9/L (ref 1.2–7.7)
NEUTROPHILS/100 LEUKOCYTES IN BLOOD BY AUTOMATED COUNT: 59.4 % (ref 40–80)
NITRITE, URINE: NEGATIVE
PH, URINE: 6 (ref 5–8)
PHOSPHATE (MG/DL) IN SER/PLAS: 3.1 MG/DL (ref 2.5–4.9)
PLATELETS (10*3/UL) IN BLOOD AUTOMATED COUNT: 142 X10E9/L (ref 150–450)
POTASSIUM (MMOL/L) IN SER/PLAS: 4.5 MMOL/L (ref 3.5–5.3)
PROTEIN (MG/DL) IN URINE: 18 MG/DL (ref 5–25)
PROTEIN TOTAL: 5.8 G/DL (ref 6.4–8.2)
PROTEIN, URINE: NEGATIVE MG/DL
PROTEIN/CREATININE (MG/MG) IN URINE: 0.12 MG/MG CREAT (ref 0–0.17)
PROTHROMBIN TIME (PT) IN PPP BY COAGULATION ASSAY: 17.8 SEC (ref 9.8–13.4)
SODIUM (MMOL/L) IN SER/PLAS: 129 MMOL/L (ref 136–145)
SPECIFIC GRAVITY, URINE: 1.02 (ref 1–1.03)
UREA NITROGEN (MG/DL) IN SER/PLAS: 32 MG/DL (ref 6–23)
UROBILINOGEN, URINE: 2 MG/DL (ref 0–1.9)

## 2023-05-26 ENCOUNTER — HOSPITAL ENCOUNTER (OUTPATIENT)
Dept: DATA CONVERSION | Facility: HOSPITAL | Age: 53
End: 2023-05-26
Attending: RADIOLOGY | Admitting: RADIOLOGY
Payer: COMMERCIAL

## 2023-05-26 DIAGNOSIS — K74.60 UNSPECIFIED CIRRHOSIS OF LIVER (MULTI): ICD-10-CM

## 2023-05-26 DIAGNOSIS — R18.8 OTHER ASCITES: ICD-10-CM

## 2023-05-26 LAB — POCT GLUCOSE: 134 MG/DL (ref 74–99)

## 2023-05-30 DIAGNOSIS — E11.9 CONTROLLED TYPE 2 DIABETES MELLITUS WITHOUT COMPLICATION, WITHOUT LONG-TERM CURRENT USE OF INSULIN (MULTI): ICD-10-CM

## 2023-05-30 DIAGNOSIS — I25.10 ATHEROSCLEROSIS OF NATIVE CORONARY ARTERY OF NATIVE HEART WITHOUT ANGINA PECTORIS: ICD-10-CM

## 2023-05-30 RX ORDER — FUROSEMIDE 40 MG/1
TABLET ORAL
Qty: 135 TABLET | Refills: 3 | Status: SHIPPED | OUTPATIENT
Start: 2023-05-30 | End: 2023-10-13 | Stop reason: ALTCHOICE

## 2023-05-30 RX ORDER — SPIRONOLACTONE 100 MG/1
TABLET, FILM COATED ORAL
Qty: 135 TABLET | Refills: 3 | Status: SHIPPED | OUTPATIENT
Start: 2023-05-30 | End: 2023-06-30 | Stop reason: SINTOL

## 2023-05-31 LAB
ALANINE AMINOTRANSFERASE (SGPT) (U/L) IN SER/PLAS: 47 U/L (ref 10–52)
ALBUMIN (G/DL) IN SER/PLAS: 3.2 G/DL (ref 3.4–5)
ALKALINE PHOSPHATASE (U/L) IN SER/PLAS: 261 U/L (ref 33–120)
ANION GAP IN SER/PLAS: 10 MMOL/L (ref 10–20)
ASPARTATE AMINOTRANSFERASE (SGOT) (U/L) IN SER/PLAS: 61 U/L (ref 9–39)
BASOPHILS (10*3/UL) IN BLOOD BY AUTOMATED COUNT: 0.07 X10E9/L (ref 0–0.1)
BASOPHILS/100 LEUKOCYTES IN BLOOD BY AUTOMATED COUNT: 1.2 % (ref 0–2)
BILIRUBIN TOTAL (MG/DL) IN SER/PLAS: 1.6 MG/DL (ref 0–1.2)
CALCIUM (MG/DL) IN SER/PLAS: 9 MG/DL (ref 8.6–10.3)
CARBON DIOXIDE, TOTAL (MMOL/L) IN SER/PLAS: 23 MMOL/L (ref 21–32)
CHLORIDE (MMOL/L) IN SER/PLAS: 99 MMOL/L (ref 98–107)
CREATININE (MG/DL) IN SER/PLAS: 1.2 MG/DL (ref 0.5–1.3)
EOSINOPHILS (10*3/UL) IN BLOOD BY AUTOMATED COUNT: 0.3 X10E9/L (ref 0–0.7)
EOSINOPHILS/100 LEUKOCYTES IN BLOOD BY AUTOMATED COUNT: 5 % (ref 0–6)
ERYTHROCYTE DISTRIBUTION WIDTH (RATIO) BY AUTOMATED COUNT: 14.2 % (ref 11.5–14.5)
ERYTHROCYTE MEAN CORPUSCULAR HEMOGLOBIN CONCENTRATION (G/DL) BY AUTOMATED: 30.7 G/DL (ref 32–36)
ERYTHROCYTE MEAN CORPUSCULAR VOLUME (FL) BY AUTOMATED COUNT: 90 FL (ref 80–100)
ERYTHROCYTES (10*6/UL) IN BLOOD BY AUTOMATED COUNT: 2.89 X10E12/L (ref 4.5–5.9)
GFR MALE: 72 ML/MIN/1.73M2
GLUCOSE (MG/DL) IN SER/PLAS: 221 MG/DL (ref 74–99)
HEMATOCRIT (%) IN BLOOD BY AUTOMATED COUNT: 26.1 % (ref 41–52)
HEMOGLOBIN (G/DL) IN BLOOD: 8 G/DL (ref 13.5–17.5)
IMMATURE GRANULOCYTES/100 LEUKOCYTES IN BLOOD BY AUTOMATED COUNT: 0.3 % (ref 0–0.9)
INR IN PPP BY COAGULATION ASSAY: 1.6 (ref 0.9–1.1)
LEUKOCYTES (10*3/UL) IN BLOOD BY AUTOMATED COUNT: 6.1 X10E9/L (ref 4.4–11.3)
LYMPHOCYTES (10*3/UL) IN BLOOD BY AUTOMATED COUNT: 1.49 X10E9/L (ref 1.2–4.8)
LYMPHOCYTES/100 LEUKOCYTES IN BLOOD BY AUTOMATED COUNT: 24.6 % (ref 13–44)
MONOCYTES (10*3/UL) IN BLOOD BY AUTOMATED COUNT: 0.95 X10E9/L (ref 0.1–1)
MONOCYTES/100 LEUKOCYTES IN BLOOD BY AUTOMATED COUNT: 15.7 % (ref 2–10)
NEUTROPHILS (10*3/UL) IN BLOOD BY AUTOMATED COUNT: 3.23 X10E9/L (ref 1.2–7.7)
NEUTROPHILS/100 LEUKOCYTES IN BLOOD BY AUTOMATED COUNT: 53.2 % (ref 40–80)
PHOSPHATE (MG/DL) IN SER/PLAS: 3.8 MG/DL (ref 2.5–4.9)
PLATELETS (10*3/UL) IN BLOOD AUTOMATED COUNT: 173 X10E9/L (ref 150–450)
POTASSIUM (MMOL/L) IN SER/PLAS: 4.4 MMOL/L (ref 3.5–5.3)
PROTEIN TOTAL: 6.2 G/DL (ref 6.4–8.2)
PROTHROMBIN TIME (PT) IN PPP BY COAGULATION ASSAY: 19 SEC (ref 9.8–13.4)
SODIUM (MMOL/L) IN SER/PLAS: 128 MMOL/L (ref 136–145)
UREA NITROGEN (MG/DL) IN SER/PLAS: 24 MG/DL (ref 6–23)

## 2023-06-09 ENCOUNTER — HOSPITAL ENCOUNTER (OUTPATIENT)
Dept: DATA CONVERSION | Facility: HOSPITAL | Age: 53
End: 2023-06-09
Attending: STUDENT IN AN ORGANIZED HEALTH CARE EDUCATION/TRAINING PROGRAM | Admitting: STUDENT IN AN ORGANIZED HEALTH CARE EDUCATION/TRAINING PROGRAM
Payer: COMMERCIAL

## 2023-06-09 DIAGNOSIS — E78.5 HYPERLIPIDEMIA, UNSPECIFIED: ICD-10-CM

## 2023-06-09 DIAGNOSIS — D63.1 ANEMIA IN CHRONIC KIDNEY DISEASE (CODE): ICD-10-CM

## 2023-06-09 DIAGNOSIS — I85.00 ESOPHAGEAL VARICES WITHOUT BLEEDING (MULTI): ICD-10-CM

## 2023-06-09 DIAGNOSIS — I25.10 ATHEROSCLEROTIC HEART DISEASE OF NATIVE CORONARY ARTERY WITHOUT ANGINA PECTORIS: ICD-10-CM

## 2023-06-09 DIAGNOSIS — E11.9 TYPE 2 DIABETES MELLITUS WITHOUT COMPLICATIONS (MULTI): ICD-10-CM

## 2023-06-09 DIAGNOSIS — J44.9 CHRONIC OBSTRUCTIVE PULMONARY DISEASE, UNSPECIFIED (MULTI): ICD-10-CM

## 2023-06-09 DIAGNOSIS — K31.819 ANGIODYSPLASIA OF STOMACH AND DUODENUM WITHOUT BLEEDING: ICD-10-CM

## 2023-06-09 DIAGNOSIS — I11.0 HYPERTENSIVE HEART DISEASE WITH HEART FAILURE (MULTI): ICD-10-CM

## 2023-06-09 DIAGNOSIS — I50.9 HEART FAILURE, UNSPECIFIED (MULTI): ICD-10-CM

## 2023-06-09 DIAGNOSIS — K76.0 FATTY (CHANGE OF) LIVER, NOT ELSEWHERE CLASSIFIED: ICD-10-CM

## 2023-06-09 DIAGNOSIS — K31.89 OTHER DISEASES OF STOMACH AND DUODENUM: ICD-10-CM

## 2023-06-09 DIAGNOSIS — K31.7 POLYP OF STOMACH AND DUODENUM: ICD-10-CM

## 2023-06-09 DIAGNOSIS — K76.6 PORTAL HYPERTENSION (MULTI): ICD-10-CM

## 2023-06-09 DIAGNOSIS — Z95.5 PRESENCE OF CORONARY ANGIOPLASTY IMPLANT AND GRAFT: ICD-10-CM

## 2023-06-09 LAB — POCT GLUCOSE: 163 MG/DL (ref 74–99)

## 2023-06-13 ENCOUNTER — OFFICE VISIT (OUTPATIENT)
Dept: PRIMARY CARE | Facility: CLINIC | Age: 53
End: 2023-06-13

## 2023-06-13 VITALS
WEIGHT: 210 LBS | TEMPERATURE: 97.1 F | HEIGHT: 69 IN | SYSTOLIC BLOOD PRESSURE: 112 MMHG | HEART RATE: 88 BPM | DIASTOLIC BLOOD PRESSURE: 78 MMHG | BODY MASS INDEX: 31.1 KG/M2

## 2023-06-13 DIAGNOSIS — I25.10 ATHEROSCLEROSIS OF NATIVE CORONARY ARTERY OF NATIVE HEART WITHOUT ANGINA PECTORIS: Primary | ICD-10-CM

## 2023-06-13 DIAGNOSIS — E03.9 ACQUIRED HYPOTHYROIDISM: ICD-10-CM

## 2023-06-13 DIAGNOSIS — K74.69 OTHER CIRRHOSIS OF LIVER (MULTI): ICD-10-CM

## 2023-06-13 DIAGNOSIS — K76.6 PORTAL HYPERTENSION (MULTI): ICD-10-CM

## 2023-06-13 DIAGNOSIS — I10 ESSENTIAL HYPERTENSION: ICD-10-CM

## 2023-06-13 DIAGNOSIS — R18.8 OTHER ASCITES: ICD-10-CM

## 2023-06-13 PROCEDURE — 3078F DIAST BP <80 MM HG: CPT | Performed by: INTERNAL MEDICINE

## 2023-06-13 PROCEDURE — 3074F SYST BP LT 130 MM HG: CPT | Performed by: INTERNAL MEDICINE

## 2023-06-13 PROCEDURE — 99214 OFFICE O/P EST MOD 30 MIN: CPT | Performed by: INTERNAL MEDICINE

## 2023-06-13 PROCEDURE — 3008F BODY MASS INDEX DOCD: CPT | Performed by: INTERNAL MEDICINE

## 2023-06-13 PROCEDURE — 1036F TOBACCO NON-USER: CPT | Performed by: INTERNAL MEDICINE

## 2023-06-13 RX ORDER — CIPROFLOXACIN 250 MG/1
250 TABLET, FILM COATED ORAL 2 TIMES DAILY
Qty: 14 TABLET | Refills: 0 | Status: SHIPPED | OUTPATIENT
Start: 2023-06-13 | End: 2023-06-20

## 2023-06-13 ASSESSMENT — ENCOUNTER SYMPTOMS
MUSCULOSKELETAL NEGATIVE: 1
EYES NEGATIVE: 1
NEUROLOGICAL NEGATIVE: 1
ABDOMINAL DISTENTION: 1
FATIGUE: 1
COUGH: 1
BLURRED VISION: 0
NAUSEA: 0
FEVER: 1
CARDIOVASCULAR NEGATIVE: 1
NERVOUS/ANXIOUS: 1

## 2023-06-13 NOTE — PROGRESS NOTES
"Subjective   Patient ID: Walker Cuenca IV is a 52 y.o. male who presents for Follow-up (Pt her for hosp follow up coughing a lot and can breath had fluid taken off Saturday 2 litters ).    Diabetes  He presents for his follow-up diabetic visit. He has type 2 diabetes mellitus. Hypoglycemia symptoms include nervousness/anxiousness. Associated symptoms include fatigue. Pertinent negatives for diabetes include no blurred vision, no chest pain and no foot paresthesias. There are no hypoglycemic complications. Symptoms are improving. He is compliant with treatment most of the time. He is currently taking insulin at bedtime. Rotation sites for injection include the abdominal wall. He is following a diabetic diet. There is no change in his home blood glucose trend. An ACE inhibitor/angiotensin II receptor blocker is being taken.      Cirrhosis  Patient presents with abdominal pain.  Onset of symptoms was gradual starting a few months ago with gradually worsening course since that time. Patient also notes  abdominal pains . Symptoms improve with none. Symptoms worsen with  exertion . There is history of  PRESCOTT . There is no history of  ETOH . Past history includes ascites and cirrhosis. Previous studies include CT scan, ultrasound, and upper endoscopy.    Review of Systems   Constitutional:  Positive for fatigue and fever.   HENT: Negative.     Eyes: Negative.  Negative for blurred vision.   Respiratory:  Positive for cough.    Cardiovascular: Negative.  Negative for chest pain.   Gastrointestinal:  Positive for abdominal distention. Negative for nausea.   Musculoskeletal: Negative.    Skin: Negative.    Neurological: Negative.    Psychiatric/Behavioral:  The patient is nervous/anxious.        Objective   Temp 36.2 °C (97.1 °F) (Temporal)   Ht 1.753 m (5' 9\")   Wt 95.3 kg (210 lb)   BMI 31.01 kg/m²     Physical Exam  Vitals reviewed.   Constitutional:       Appearance: Normal appearance. He is normal weight. He is " ill-appearing.   HENT:      Head: Normocephalic and atraumatic.   Eyes:      Conjunctiva/sclera: Conjunctivae normal.   Cardiovascular:      Rate and Rhythm: Normal rate and regular rhythm.      Pulses: Normal pulses.   Pulmonary:      Effort: Pulmonary effort is normal.      Breath sounds: Normal breath sounds.   Abdominal:      General: There is distension.      Palpations: Abdomen is soft.      Tenderness: There is no abdominal tenderness.   Musculoskeletal:         General: Normal range of motion.      Cervical back: Normal range of motion.   Skin:     General: Skin is warm and dry.   Neurological:      General: No focal deficit present.   Psychiatric:         Mood and Affect: Mood normal.       Assessment/Plan   Problem List Items Addressed This Visit          Circulatory    Atherosclerotic heart disease of native coronary artery without angina pectoris - Primary    Essential hypertension    Portal hypertension (CMS/HCC)       Digestive    Cirrhosis of liver (CMS/HCC)    Other ascites       Endocrine/Metabolic    Hypothyroidism   He is not doing good, he was hospitalized just for a few hours and they did a ascites stepping, patient was very upset about the way in which it was handled.  He has a meeting for liver transplant, patient is a candidate for liver transplant because he has been showing features of decompensated liver disease now, ascites has been coming frequently, this is portal hypertension, liver disease has been showing high MELD score, hepatology has been following this patient.  If this ascites and portal hypertension continued to get worse then patient's prognosis could be guarded.  There was a question about his levothyroxine which is not the issue right now.  He continues to be anemic, he continues to have a cough now, cough came back, this is a hepatopulmonary syndrome or it was unexplained cough from of organizing pneumonia, at this time we will just use ciprofloxacin because he has some  low-grade fever after ascites tapping so spontaneous bacterial peritonitis cannot be ruled out, since he has a meeting for liver transplant he prefers not to go to the hospital but he may require admission for couple of days for ascites tapping, he has a heart disease with PCI and after that actually his liver disease has been getting worse, BP readings are fluctuating, he remains on propranolol, cirrhosis of liver has been a longstanding process, at a young age he has been showing a lot of comorbidities, his blood sugars are not an issue, anemia persist, we hope that liver transplant can be processed quickly and faster otherwise it can affect his prognosis.  Follow-up in 2 months.

## 2023-06-14 LAB
COMPLETE PATHOLOGY REPORT: NORMAL
CONVERTED CLINICAL DIAGNOSIS-HISTORY: NORMAL
CONVERTED FINAL DIAGNOSIS: NORMAL
CONVERTED FINAL REPORT PDF LINK TO COPY AND PASTE: NORMAL
CONVERTED GROSS DESCRIPTION: NORMAL

## 2023-06-15 ENCOUNTER — APPOINTMENT (OUTPATIENT)
Dept: LAB | Facility: LAB | Age: 53
End: 2023-06-15
Payer: COMMERCIAL

## 2023-06-15 LAB
ABO GROUP (TYPE) IN BLOOD: NORMAL
ALANINE AMINOTRANSFERASE (SGPT) (U/L) IN SER/PLAS: 37 U/L (ref 10–52)
ALBUMIN (G/DL) IN SER/PLAS: 3.3 G/DL (ref 3.4–5)
ALCOHOL (MG/DL) IN URINE: <10 MG/DL
ALKALINE PHOSPHATASE (U/L) IN SER/PLAS: 225 U/L (ref 33–120)
ALPHA 1 ANTITRYPSIN (MG/DL) IN SER/PLAS: 246 MG/DL (ref 84–218)
ALPHA-1 FETOPROTEIN (NG/ML) IN SER/PLAS: <4 NG/ML (ref 0–9)
AMPHETAMINE (PRESENCE) IN URINE BY SCREEN METHOD: NORMAL
ANION GAP IN SER/PLAS: 20 MMOL/L (ref 10–20)
ASPARTATE AMINOTRANSFERASE (SGOT) (U/L) IN SER/PLAS: 53 U/L (ref 9–39)
BARBITURATES PRESENCE IN URINE BY SCREEN METHOD: NORMAL
BASOPHILS (10*3/UL) IN BLOOD BY AUTOMATED COUNT: 0.05 X10E9/L (ref 0–0.1)
BASOPHILS/100 LEUKOCYTES IN BLOOD BY AUTOMATED COUNT: 0.7 % (ref 0–2)
BENZODIAZEPINE (PRESENCE) IN URINE BY SCREEN METHOD: NORMAL
BILIRUBIN DIRECT (MG/DL) IN SER/PLAS: 0.7 MG/DL (ref 0–0.3)
BILIRUBIN TOTAL (MG/DL) IN SER/PLAS: 2.1 MG/DL (ref 0–1.2)
CALCIDIOL (25 OH VITAMIN D3) (NG/ML) IN SER/PLAS: 31 NG/ML
CALCIUM (MG/DL) IN SER/PLAS: 9.6 MG/DL (ref 8.6–10.6)
CANCER AG 19-9 (U/ML) IN SER/PLAS: <4 U/ML
CANNABINOIDS IN URINE BY SCREEN METHOD: NORMAL
CARBON DIOXIDE, TOTAL (MMOL/L) IN SER/PLAS: 20 MMOL/L (ref 21–32)
CARCINOEMBRYONIC AG (NG/ML) IN SER/PLAS: 2.5 UG/L
CERULOPLASMIN (MG/DL) IN SER/PLAS: 43 MG/DL (ref 20–60)
CHLORIDE (MMOL/L) IN SER/PLAS: 94 MMOL/L (ref 98–107)
COCAINE (PRESENCE) IN URINE BY SCREEN METHOD: NORMAL
CREATININE (MG/DL) IN SER/PLAS: 2.52 MG/DL (ref 0.5–1.3)
CYTOMEGALOVIRUS IGG ANTIBODY: NONREACTIVE
DRUG SCREEN COMMENT URINE: NORMAL
EBV INTERPRETATION: ABNORMAL
EOSINOPHILS (10*3/UL) IN BLOOD BY AUTOMATED COUNT: 0.1 X10E9/L (ref 0–0.7)
EOSINOPHILS/100 LEUKOCYTES IN BLOOD BY AUTOMATED COUNT: 1.3 % (ref 0–6)
EPSTEIN-BARR VCA IGG: POSITIVE
EPSTEIN-BARR VCA IGM: NEGATIVE
EPSTEIN-BARR VIRUS EARLY ANTIGEN ANTIBODY, IGG: NEGATIVE
EPSTIEN-BARR NUCLEAR ANTIGEN AB: POSITIVE
ERYTHROCYTE DISTRIBUTION WIDTH (RATIO) BY AUTOMATED COUNT: 14.2 % (ref 11.5–14.5)
ERYTHROCYTE MEAN CORPUSCULAR HEMOGLOBIN CONCENTRATION (G/DL) BY AUTOMATED: 30.8 G/DL (ref 32–36)
ERYTHROCYTE MEAN CORPUSCULAR VOLUME (FL) BY AUTOMATED COUNT: 91 FL (ref 80–100)
ERYTHROCYTES (10*6/UL) IN BLOOD BY AUTOMATED COUNT: 2.6 X10E12/L (ref 4.5–5.9)
ESTIMATED AVERAGE GLUCOSE FOR HBA1C: 126 MG/DL
FENTANYL URINE: NORMAL
FERRITIN (UG/LL) IN SER/PLAS: 25 UG/L (ref 20–300)
GFR MALE: 29 ML/MIN/1.73M2
GLUCOSE (MG/DL) IN SER/PLAS: 97 MG/DL (ref 74–99)
HEMATOCRIT (%) IN BLOOD BY AUTOMATED COUNT: 23.7 % (ref 41–52)
HEMOGLOBIN (G/DL) IN BLOOD: 7.3 G/DL (ref 13.5–17.5)
HEMOGLOBIN A1C/HEMOGLOBIN TOTAL IN BLOOD: 6 %
HEPATITIS A TOTAL AB INTERPRETATION: REACTIVE
HEPATITIS B VIRUS CORE AB (PRESENCE) IN SER/PLAS BY IMM: NONREACTIVE
HEPATITIS B VIRUS CORE IGM AB PRESENCE IN SER/PLAS BY IMMUNOASSY: NONREACTIVE
HEPATITIS B VIRUS SURFACE AB (MIU/ML) IN SERUM: <3.1 MIU/ML
HEPATITIS B VIRUS SURFACE AG PRESENCE IN SERUM: NONREACTIVE
HEPATITIS C VIRUS AB PRESENCE IN SERUM: NONREACTIVE
HERPES SIMPLEX VIRUS 1 IGG: >8 INDEX
HERPES SIMPLEX VIRUS 2 IGG: <0.2 INDEX
HIV 1/ 2 AG/AB SCREEN: NONREACTIVE
IMMATURE GRANULOCYTES/100 LEUKOCYTES IN BLOOD BY AUTOMATED COUNT: 0.4 % (ref 0–0.9)
INR IN PPP BY COAGULATION ASSAY: 1.6 (ref 0.9–1.1)
IRON (UG/DL) IN SER/PLAS: 50 UG/DL (ref 35–150)
IRON BINDING CAPACITY (UG/DL) IN SER/PLAS: 439 UG/DL (ref 240–445)
IRON SATURATION (%) IN SER/PLAS: 11 % (ref 25–45)
LEUKOCYTES (10*3/UL) IN BLOOD BY AUTOMATED COUNT: 7.6 X10E9/L (ref 4.4–11.3)
LYMPHOCYTES (10*3/UL) IN BLOOD BY AUTOMATED COUNT: 1.64 X10E9/L (ref 1.2–4.8)
LYMPHOCYTES/100 LEUKOCYTES IN BLOOD BY AUTOMATED COUNT: 21.5 % (ref 13–44)
METHADONE (PRESENCE) IN URINE BY SCREEN METHOD: NORMAL
MONOCYTES (10*3/UL) IN BLOOD BY AUTOMATED COUNT: 1.03 X10E9/L (ref 0.1–1)
MONOCYTES/100 LEUKOCYTES IN BLOOD BY AUTOMATED COUNT: 13.5 % (ref 2–10)
NEUTROPHILS (10*3/UL) IN BLOOD BY AUTOMATED COUNT: 4.79 X10E9/L (ref 1.2–7.7)
NEUTROPHILS/100 LEUKOCYTES IN BLOOD BY AUTOMATED COUNT: 62.6 % (ref 40–80)
NRBC (PER 100 WBCS) BY AUTOMATED COUNT: 0 /100 WBC (ref 0–0)
OPIATES (PRESENCE) IN URINE BY SCREEN METHOD: NORMAL
OXYCODONE (PRESENCE) IN URINE BY SCREEN METHOD: NORMAL
PHENCYCLIDINE (PRESENCE) IN URINE BY SCREEN METHOD: NORMAL
PLATELETS (10*3/UL) IN BLOOD AUTOMATED COUNT: 234 X10E9/L (ref 150–450)
POTASSIUM (MMOL/L) IN SER/PLAS: 5.2 MMOL/L (ref 3.5–5.3)
PROTEIN TOTAL: 6.1 G/DL (ref 6.4–8.2)
PROTHROMBIN TIME (PT) IN PPP BY COAGULATION ASSAY: 18.7 SEC (ref 9.8–13.4)
RH FACTOR: NORMAL
SODIUM (MMOL/L) IN SER/PLAS: 129 MMOL/L (ref 136–145)
SYPHILIS TOTAL AB: NONREACTIVE
THYROTROPIN (MIU/L) IN SER/PLAS BY DETECTION LIMIT <= 0.05 MIU/L: 2.36 MIU/L (ref 0.44–3.98)
UREA NITROGEN (MG/DL) IN SER/PLAS: 69 MG/DL (ref 6–23)
VARICELLA ZOSTER IGG: POSITIVE

## 2023-06-16 LAB
ANA PATTERN: ABNORMAL
ANA TITER: ABNORMAL
ANTI-NUCLEAR ANTIBODY (ANA): POSITIVE
MITOCHONDRIAL ANTIBODY: NEGATIVE

## 2023-06-17 LAB — HEPATITIS BE ANTIGEN: NEGATIVE

## 2023-06-18 LAB
NIL(NEG) CONTROL SPOT COUNT: NORMAL
PANEL A SPOT COUNT: 0
PANEL B SPOT COUNT: 0
POS CONTROL SPOT COUNT: NORMAL
T-SPOT. TB INTERPRETATION: NEGATIVE

## 2023-06-19 LAB
ANTI-SMOOTH MUSCLE ANTIBODY: ABNORMAL
CYTOMEGALOVIRUS IGM ANTIBODY: <8 AU/ML
ETHYL GLUCURONIDE SCREEN: NEGATIVE NG/ML
HEPATITIS BE ANTIBODY: NEGATIVE
LIVER/KIDNEY MICROSOME TYPE 1 ANTIBODIES: NORMAL
PROSTATE SPECIFIC AG (NG/ML) IN SER/PLAS: <0.1 NG/ML (ref 0–4)
PROSTATE SPECIFIC AG FREE (NG/ML) IN SER/PLAS: <0.1 NG/ML
PROSTATE SPECIFIC AG FREE/PROSTATE SPECIFIC AG TOTAL IN SER/PLAS: NORMAL %

## 2023-06-21 LAB
EER PETH: NORMAL
PETH 16:0/18:1 (POPETH): <10 NG/ML
PETH 16:0/18:2 (PLPETH): <10 NG/ML

## 2023-06-26 ENCOUNTER — PATIENT OUTREACH (OUTPATIENT)
Dept: PRIMARY CARE | Facility: CLINIC | Age: 53
End: 2023-06-26
Payer: COMMERCIAL

## 2023-06-26 NOTE — PROGRESS NOTES
Discharge Facility:  [facility]  Discharge Diagnosis:  Admission Date: 6/15/23  Discharge Date:  6/23/23    PCP Appointment Date:  6/30/23    Specialist Appointment Date:      Physician/Dept/Service:   Cardiology (Dr.Farhat Mar)          Reason for Referral:   6 Month F/U          Scheduled Date/Time:   29-Aug-2023 15:00          Location:   [facility] [place]            Physician/Dept/Service:   Pulmonology (Dr. Stoney Khan)          Scheduled Date/Time:   30-Aug-2030 15:40          Location:    Pulmonary [facility]            Physician/Dept/Service:   Hepatology (Jordy Valdez)          Reason for Referral:   Pre- Liver transplant F/U          Appt 6/27/23          Location:   Needs to be scheduled, please call [phone].  Hospital Encounter and Summary: Linked   See discharge assessment below for further details     Engagement  Call Start Time: 1315 (6/26/2023  1:17 PM)    Medications  Medications reviewed with patient/caregiver?: Yes (6/26/2023  1:17 PM)  Is the patient having any side effects they believe may be caused by any medication additions or changes?: No (6/26/2023  1:17 PM)  Does the patient have all medications ordered at discharge?: Yes (6/26/2023  1:17 PM)  Is the patient taking all medications as directed (includes completed medication regime)?: Yes (6/26/2023  1:17 PM)  Medication Comments: reviewed changed, new, and discontinued meds.  aldactone discontinued. lasix changed to 40 mg daily. (6/26/2023  1:17 PM)    Appointments  Does the patient have a primary care provider?: Yes (6/26/2023  1:17 PM)  Care Management Interventions: Verified appointment date/time/provider (appt 6/30/23) (6/26/2023  1:17 PM)  Has the patient kept scheduled appointments due by today?: Yes (6/26/2023  1:17 PM)  Care Management Interventions: Educated on importance of keeping appointment; Advised patient to keep appointment (6/26/2023  1:17 PM)    Self Management  Has home health visited the patient within 72 hours of  discharge?: Not applicable (6/26/2023  1:17 PM)    Patient Teaching  Does the patient have access to their discharge instructions?: Yes (6/26/2023  1:17 PM)  What is the patient's perception of their health status since discharge?: Same (feels like cough is worse.  nonproductive.) (6/26/2023  1:17 PM)  Is the patient/caregiver able to teach back the hierarchy of who to call/visit for symptoms/problems? PCP, Specialist, Home Health nurse, Urgent Care, ED, 911: Yes (6/26/2023  1:17 PM)  Patient/Caregiver Education Comments: discussed splinting with pillow.  cough comes and goes. no temp (6/26/2023  1:17 PM)

## 2023-06-30 ENCOUNTER — OFFICE VISIT (OUTPATIENT)
Dept: PRIMARY CARE | Facility: CLINIC | Age: 53
End: 2023-06-30
Payer: COMMERCIAL

## 2023-06-30 VITALS
WEIGHT: 209 LBS | HEART RATE: 89 BPM | HEIGHT: 68 IN | TEMPERATURE: 97.7 F | BODY MASS INDEX: 31.67 KG/M2 | SYSTOLIC BLOOD PRESSURE: 100 MMHG | DIASTOLIC BLOOD PRESSURE: 67 MMHG

## 2023-06-30 DIAGNOSIS — D50.0 IRON DEFICIENCY ANEMIA DUE TO CHRONIC BLOOD LOSS: ICD-10-CM

## 2023-06-30 DIAGNOSIS — E86.0 DEHYDRATION WITH HYPONATREMIA: ICD-10-CM

## 2023-06-30 DIAGNOSIS — I10 ESSENTIAL HYPERTENSION: ICD-10-CM

## 2023-06-30 DIAGNOSIS — K74.69 OTHER CIRRHOSIS OF LIVER (MULTI): Primary | ICD-10-CM

## 2023-06-30 DIAGNOSIS — K76.0 NAFLD (NONALCOHOLIC FATTY LIVER DISEASE): ICD-10-CM

## 2023-06-30 DIAGNOSIS — E03.9 HYPOTHYROIDISM, UNSPECIFIED TYPE: ICD-10-CM

## 2023-06-30 DIAGNOSIS — R18.8 OTHER ASCITES: ICD-10-CM

## 2023-06-30 DIAGNOSIS — E87.1 DEHYDRATION WITH HYPONATREMIA: ICD-10-CM

## 2023-06-30 PROCEDURE — 99496 TRANSJ CARE MGMT HIGH F2F 7D: CPT | Performed by: INTERNAL MEDICINE

## 2023-06-30 PROCEDURE — 3008F BODY MASS INDEX DOCD: CPT | Performed by: INTERNAL MEDICINE

## 2023-06-30 PROCEDURE — 3044F HG A1C LEVEL LT 7.0%: CPT | Performed by: INTERNAL MEDICINE

## 2023-06-30 PROCEDURE — 3074F SYST BP LT 130 MM HG: CPT | Performed by: INTERNAL MEDICINE

## 2023-06-30 PROCEDURE — 3078F DIAST BP <80 MM HG: CPT | Performed by: INTERNAL MEDICINE

## 2023-06-30 PROCEDURE — 1036F TOBACCO NON-USER: CPT | Performed by: INTERNAL MEDICINE

## 2023-06-30 RX ORDER — METFORMIN HYDROCHLORIDE 1000 MG/1
500 TABLET ORAL
Qty: 45 TABLET | Refills: 3 | Status: SHIPPED
Start: 2023-06-30 | End: 2023-08-15 | Stop reason: ALTCHOICE

## 2023-06-30 NOTE — PROGRESS NOTES
"Patient: Walker Cuenca  : 1970  PCP: Jackson Damon MD  MRN: [id number]  Program: No linked episodes     Walker Ceunca is a 52 y.o. male presenting today for follow-up after being discharged from the hospital 7 days ago. The main problem requiring admission was ascites, hyponatremia. The discharge summary and/or Transitional Care Management documentation was reviewed. Medication reconciliation was performed as indicated via the \"Jr as Reviewed\" timestamp.     Walker Cuenca was contacted by Transitional Care Management services two days after his discharge. This encounter and supporting documentation was reviewed.    The complexity of medical decision making for this patient's transitional care is high.    Physical Exam  Vitals reviewed.   Constitutional:       Appearance: Normal appearance. He is normal weight. He is ill-appearing.   HENT:      Head: Normocephalic and atraumatic.   Eyes:      Conjunctiva/sclera: Conjunctivae normal.   Cardiovascular:      Rate and Rhythm: Normal rate and regular rhythm.      Pulses: Normal pulses.   Pulmonary:      Effort: Pulmonary effort is normal.      Breath sounds: Normal breath sounds.   Abdominal:      General: There is distension.      Palpations: Abdomen is soft.      Tenderness: There is no abdominal tenderness.   Musculoskeletal:         General: Normal range of motion.      Cervical back: Normal range of motion.   Skin:     General: Skin is warm and dry.   Neurological:      General: No focal deficit present.   Psychiatric:         Mood and Affect: Mood normal.   Assessment/Plan   Problem List Items Addressed This Visit       Anemia    Cirrhosis of liver (CMS/HCC) - Primary    Essential hypertension    Relevant Medications    metFORMIN (Glucophage) 1,000 mg tablet    Hypothyroidism    Relevant Medications    metFORMIN (Glucophage) 1,000 mg tablet    NAFLD (nonalcoholic fatty liver disease)    Other ascites    Dehydration with hyponatremia   Recent hospitalization " data and information reviewed, all reports, labs, radiological investigations and consultations and follow ups reviewed during this visit. Pt is doing well, precautions to be taken in the future for acute ailments or acute illness and change of condition are discussed, will continue to have close follow up, medication list was reviewed and updated and necessary changes were applied.  Patient went for transplant evaluation, laboratories were done, creatinine was 2.3, sodium was 127, admitted again, attempted paracentesis couple of times, patient states that he did not have a good experience over there, he continued to be anemic, he underwent upper and lower endoscopy, no variceal bleed was found, still he needs a extensive cardiovascular evaluation even though he has a cardiology evaluation done over here and pulmonary evaluation before they can discuss his case at our transplant committee, patient's ascites has been increasing rapidly, paracentesis should be done otherwise he will be quite symptomatic, his abdomen is distended, it is about to be a tense abdomen.  Aldactone has been discontinued.  It is very much clear that without transplant his prognosis would be guarded, he continued to have a cough, coughing could be because of hepatopulmonary syndrome and also he has a vascular congestion.  We hope that the transplant committee decide soon about his transplant approval and transplant is the only way he can have a prolongation of his life, this was a transitional care related visit and all other laboratories and investigations were reviewed.    Review of Systems  Constitutional:  Positive for fatigue and fever.   HENT: Negative.     Eyes: Negative.  Negative for blurred vision.   Respiratory:  Positive for cough.    Cardiovascular: Negative.  Negative for chest pain.   Gastrointestinal:  Positive for abdominal distention. Negative for nausea.   Musculoskeletal: Negative.    Skin: Negative.    Neurological:  Negative.    Psychiatric/Behavioral:  The patient is nervous/anxious.      No family history on file.    Engagement  Call Start Time: 1315 (6/26/2023  1:17 PM)    Medications  Medications reviewed with patient/caregiver?: Yes (6/26/2023  1:17 PM)  Is the patient having any side effects they believe may be caused by any medication additions or changes?: No (6/26/2023  1:17 PM)  Does the patient have all medications ordered at discharge?: Yes (6/26/2023  1:17 PM)  Is the patient taking all medications as directed (includes completed medication regime)?: Yes (6/26/2023  1:17 PM)  Medication Comments: reviewed changed, new, and discontinued meds.  aldactone discontinued. lasix changed to 40 mg daily. (6/26/2023  1:17 PM)    Appointments  Does the patient have a primary care provider?: Yes (6/26/2023  1:17 PM)  Care Management Interventions: Verified appointment date/time/provider (appt 6/30/23) (6/26/2023  1:17 PM)  Has the patient kept scheduled appointments due by today?: Yes (6/26/2023  1:17 PM)  Care Management Interventions: Educated on importance of keeping appointment; Advised patient to keep appointment (6/26/2023  1:17 PM)    Self Management  Has home health visited the patient within 72 hours of discharge?: Not applicable (6/26/2023  1:17 PM)    Patient Teaching  Does the patient have access to their discharge instructions?: Yes (6/26/2023  1:17 PM)  What is the patient's perception of their health status since discharge?: Same (feels like cough is worse.  nonproductive.) (6/26/2023  1:17 PM)  Is the patient/caregiver able to teach back the hierarchy of who to call/visit for symptoms/problems? PCP, Specialist, Home Health nurse, Urgent Care, ED, 911: Yes (6/26/2023  1:17 PM)  Patient/Caregiver Education Comments: discussed splinting with pillow.  cough comes and goes. no temp (6/26/2023  1:17 PM)        No follow-ups on file.

## 2023-07-05 DIAGNOSIS — R18.8 OTHER ASCITES: Primary | ICD-10-CM

## 2023-07-05 RX ORDER — LEVOFLOXACIN 250 MG/1
250 TABLET ORAL DAILY
Qty: 7 TABLET | Refills: 0 | Status: SHIPPED | OUTPATIENT
Start: 2023-07-05 | End: 2023-07-12

## 2023-07-06 ENCOUNTER — DOCUMENTATION (OUTPATIENT)
Dept: PRIMARY CARE | Facility: CLINIC | Age: 53
End: 2023-07-06
Payer: COMMERCIAL

## 2023-07-06 NOTE — PROGRESS NOTES
Discharge Facility:  [facility]  Discharge Diagnosis: ascites  Admission Date: 7/3/23  Discharge Date: 7/3/23  LEFT AMA    PCP Appointment Date: not scheduled  Specialist Appointment Date:   Physician/Dept/Service: Dr. Valdez Call to Schedule in: 2 weeks     Hospital Encounter and Summary: Linked   See discharge assessment below for further details     Engagement  Call Start Time: 0100 (7/6/2023  1:01 PM)    Medications  Medications reviewed with patient/caregiver?: Yes (7/6/2023  1:01 PM)  Is the patient having any side effects they believe may be caused by any medication additions or changes?: No (7/6/2023  1:01 PM)  Does the patient have all medications ordered at discharge?: Yes (7/6/2023  1:01 PM)  Is the patient taking all medications as directed (includes completed medication regime)?: Yes (6/26/2023  1:17 PM)  Medication Comments: reviewed changed, new, and discontinued meds.  aldactone discontinued. lasix changed to 40 mg daily. (6/26/2023  1:17 PM)    Appointments  Does the patient have a primary care provider?: Yes (7/6/2023  1:01 PM)  Care Management Interventions: -- (did not wish to schedule appt sooner than one previously scheduled in august) (7/6/2023  1:01 PM)  Has the patient kept scheduled appointments due by today?: Yes (6/26/2023  1:17 PM)  Care Management Interventions: Educated on importance of keeping appointment; Advised patient to keep appointment (6/26/2023  1:17 PM)    Self Management  Has home health visited the patient within 72 hours of discharge?: Not applicable (7/6/2023  1:01 PM)    Patient Teaching  Does the patient have access to their discharge instructions?: Yes (7/6/2023  1:01 PM)  What is the patient's perception of their health status since discharge?: Same (7/6/2023  1:01 PM)  Is the patient/caregiver able to teach back the hierarchy of who to call/visit for symptoms/problems? PCP, Specialist, Home Health nurse, Urgent Care, ED, 911: Yes (6/26/2023  1:17  PM)  Patient/Caregiver Education Comments: spoke with amalia.  phone going in and out.  did not want assist scheduling appt. (7/6/2023  1:01 PM)

## 2023-07-11 LAB
ALANINE AMINOTRANSFERASE (SGPT) (U/L) IN SER/PLAS: 48 U/L (ref 10–52)
ALBUMIN (G/DL) IN SER/PLAS: 3.3 G/DL (ref 3.4–5)
ALKALINE PHOSPHATASE (U/L) IN SER/PLAS: 248 U/L (ref 33–120)
ANION GAP IN SER/PLAS: 12 MMOL/L (ref 10–20)
ASPARTATE AMINOTRANSFERASE (SGOT) (U/L) IN SER/PLAS: 68 U/L (ref 9–39)
BASOPHILS (10*3/UL) IN BLOOD BY AUTOMATED COUNT: 0.06 X10E9/L (ref 0–0.1)
BASOPHILS/100 LEUKOCYTES IN BLOOD BY AUTOMATED COUNT: 1 % (ref 0–2)
BILIRUBIN TOTAL (MG/DL) IN SER/PLAS: 1.8 MG/DL (ref 0–1.2)
CALCIUM (MG/DL) IN SER/PLAS: 9 MG/DL (ref 8.6–10.3)
CARBON DIOXIDE, TOTAL (MMOL/L) IN SER/PLAS: 24 MMOL/L (ref 21–32)
CHLORIDE (MMOL/L) IN SER/PLAS: 99 MMOL/L (ref 98–107)
CREATININE (MG/DL) IN SER/PLAS: 1.51 MG/DL (ref 0.5–1.3)
EOSINOPHILS (10*3/UL) IN BLOOD BY AUTOMATED COUNT: 0.3 X10E9/L (ref 0–0.7)
EOSINOPHILS/100 LEUKOCYTES IN BLOOD BY AUTOMATED COUNT: 4.9 % (ref 0–6)
ERYTHROCYTE DISTRIBUTION WIDTH (RATIO) BY AUTOMATED COUNT: 15.6 % (ref 11.5–14.5)
ERYTHROCYTE MEAN CORPUSCULAR HEMOGLOBIN CONCENTRATION (G/DL) BY AUTOMATED: 30.5 G/DL (ref 32–36)
ERYTHROCYTE MEAN CORPUSCULAR VOLUME (FL) BY AUTOMATED COUNT: 92 FL (ref 80–100)
ERYTHROCYTES (10*6/UL) IN BLOOD BY AUTOMATED COUNT: 2.64 X10E12/L (ref 4.5–5.9)
GFR MALE: 54 ML/MIN/1.73M2
GLUCOSE (MG/DL) IN SER/PLAS: 126 MG/DL (ref 74–99)
HEMATOCRIT (%) IN BLOOD BY AUTOMATED COUNT: 24.3 % (ref 41–52)
HEMOGLOBIN (G/DL) IN BLOOD: 7.4 G/DL (ref 13.5–17.5)
IMMATURE GRANULOCYTES/100 LEUKOCYTES IN BLOOD BY AUTOMATED COUNT: 0.2 % (ref 0–0.9)
INR IN PPP BY COAGULATION ASSAY: 1.7 (ref 0.9–1.1)
LEUKOCYTES (10*3/UL) IN BLOOD BY AUTOMATED COUNT: 6.1 X10E9/L (ref 4.4–11.3)
LYMPHOCYTES (10*3/UL) IN BLOOD BY AUTOMATED COUNT: 1.61 X10E9/L (ref 1.2–4.8)
LYMPHOCYTES/100 LEUKOCYTES IN BLOOD BY AUTOMATED COUNT: 26.4 % (ref 13–44)
MONOCYTES (10*3/UL) IN BLOOD BY AUTOMATED COUNT: 0.79 X10E9/L (ref 0.1–1)
MONOCYTES/100 LEUKOCYTES IN BLOOD BY AUTOMATED COUNT: 13 % (ref 2–10)
NEUTROPHILS (10*3/UL) IN BLOOD BY AUTOMATED COUNT: 3.33 X10E9/L (ref 1.2–7.7)
NEUTROPHILS/100 LEUKOCYTES IN BLOOD BY AUTOMATED COUNT: 54.5 % (ref 40–80)
PLATELETS (10*3/UL) IN BLOOD AUTOMATED COUNT: 152 X10E9/L (ref 150–450)
POTASSIUM (MMOL/L) IN SER/PLAS: 4.3 MMOL/L (ref 3.5–5.3)
PROTEIN TOTAL: 5.6 G/DL (ref 6.4–8.2)
PROTHROMBIN TIME (PT) IN PPP BY COAGULATION ASSAY: 19.6 SEC (ref 9.8–12.8)
SODIUM (MMOL/L) IN SER/PLAS: 131 MMOL/L (ref 136–145)
UREA NITROGEN (MG/DL) IN SER/PLAS: 39 MG/DL (ref 6–23)

## 2023-07-12 ENCOUNTER — OFFICE VISIT (OUTPATIENT)
Dept: PRIMARY CARE | Facility: CLINIC | Age: 53
End: 2023-07-12
Payer: COMMERCIAL

## 2023-07-12 VITALS
TEMPERATURE: 96.6 F | BODY MASS INDEX: 31.7 KG/M2 | DIASTOLIC BLOOD PRESSURE: 60 MMHG | SYSTOLIC BLOOD PRESSURE: 80 MMHG | HEART RATE: 89 BPM | WEIGHT: 210 LBS

## 2023-07-12 DIAGNOSIS — K74.69 OTHER CIRRHOSIS OF LIVER (MULTI): Primary | ICD-10-CM

## 2023-07-12 DIAGNOSIS — K74.60 DECOMPENSATION OF CIRRHOSIS OF LIVER (MULTI): ICD-10-CM

## 2023-07-12 DIAGNOSIS — R18.8 OTHER ASCITES: ICD-10-CM

## 2023-07-12 DIAGNOSIS — K72.90 DECOMPENSATION OF CIRRHOSIS OF LIVER (MULTI): ICD-10-CM

## 2023-07-12 DIAGNOSIS — K76.6 PORTAL HYPERTENSION (MULTI): ICD-10-CM

## 2023-07-12 DIAGNOSIS — D50.0 IRON DEFICIENCY ANEMIA DUE TO CHRONIC BLOOD LOSS: ICD-10-CM

## 2023-07-12 PROCEDURE — 1036F TOBACCO NON-USER: CPT | Performed by: INTERNAL MEDICINE

## 2023-07-12 PROCEDURE — 3044F HG A1C LEVEL LT 7.0%: CPT | Performed by: INTERNAL MEDICINE

## 2023-07-12 PROCEDURE — 3008F BODY MASS INDEX DOCD: CPT | Performed by: INTERNAL MEDICINE

## 2023-07-12 PROCEDURE — 3074F SYST BP LT 130 MM HG: CPT | Performed by: INTERNAL MEDICINE

## 2023-07-12 PROCEDURE — 3078F DIAST BP <80 MM HG: CPT | Performed by: INTERNAL MEDICINE

## 2023-07-12 PROCEDURE — 99214 OFFICE O/P EST MOD 30 MIN: CPT | Performed by: INTERNAL MEDICINE

## 2023-07-12 PROCEDURE — 3066F NEPHROPATHY DOC TX: CPT | Performed by: INTERNAL MEDICINE

## 2023-07-12 ASSESSMENT — ENCOUNTER SYMPTOMS
RECTAL PAIN: 0
DIZZINESS: 1
BACK PAIN: 1
VOMITING: 0
SHORTNESS OF BREATH: 1
BRUISES/BLEEDS EASILY: 1
EYES NEGATIVE: 1
CHILLS: 0
DIFFICULTY URINATING: 1
ARTHRALGIAS: 1
ABDOMINAL DISTENTION: 1
ABDOMINAL PAIN: 1
FATIGUE: 1
WEAKNESS: 1
COUGH: 1

## 2023-07-12 NOTE — PROGRESS NOTES
Subjective   Patient ID: Walker Cuenca is a 52 y.o. male who presents for Follow-up.    This patient is having recurrent ascites accumulation, he has a very advanced and decompensated cirrhosis of liver, he just have a ascites stepping and today his abdomen is distended, his quality of life has been hampered down, he is being evaluated for transplant, transplant team has a meeting about him and patient is getting into a transplant list as soon as possible, today he came he is frustrated, he continues to have a coughing, he has a classical appearance of advanced liver disease patient, his ascites has been reaccumulated, he has been having shortness of breath, his electrolytes are disturbed, his creatinine has been worsening.  Tomorrow he will have another paracentesis, he is asking my help what to do and I am concerned that his ascites has been reaccumulating so fast, none of the diuretics would be helpful to him, he has a decompensated liver disease with portal hypertension.         Review of Systems   Constitutional:  Positive for fatigue. Negative for chills.   HENT:  Negative for congestion.    Eyes: Negative.  Negative for visual disturbance.   Respiratory:  Positive for cough and shortness of breath.    Cardiovascular:  Positive for leg swelling.   Gastrointestinal:  Positive for abdominal distention and abdominal pain. Negative for rectal pain and vomiting.   Genitourinary:  Positive for difficulty urinating. Negative for decreased urine volume and urgency.   Musculoskeletal:  Positive for arthralgias and back pain.   Skin:  Positive for pallor.   Neurological:  Positive for dizziness and weakness.   Hematological:  Bruises/bleeds easily.       Objective   BP 80/60 (BP Location: Right arm, Patient Position: Sitting, BP Cuff Size: Adult)   Pulse 89   Temp 35.9 °C (96.6 °F) (Temporal)   Wt 95.3 kg (210 lb)   BMI 31.70 kg/m²     Physical Exam  Vitals reviewed.   Constitutional:       General: He is in acute  distress.      Appearance: Normal appearance. He is normal weight. He is ill-appearing.   HENT:      Head: Normocephalic.   Eyes:      Conjunctiva/sclera: Conjunctivae normal.   Cardiovascular:      Rate and Rhythm: Normal rate and regular rhythm.      Pulses: Normal pulses.   Pulmonary:      Effort: Pulmonary effort is normal.      Breath sounds: Normal breath sounds.   Abdominal:      General: There is distension.      Palpations: There is shifting dullness and fluid wave.      Tenderness: There is abdominal tenderness.   Musculoskeletal:         General: Normal range of motion.      Cervical back: Normal range of motion.   Skin:     General: Skin is warm and dry.   Neurological:      General: No focal deficit present.   Psychiatric:         Mood and Affect: Mood normal.   Pt anxious and in distress    Assessment/Plan   Problem List Items Addressed This Visit       Anemia    Decompensation of cirrhosis of liver (CMS/HCC) - Primary    Portal hypertension (CMS/HCC)    Other ascites   I discussed with the transplant center and I discussed with hepatology at transplant center and patient has appointment this Friday for consent signing and they are going to get approval through insurance company and they will do their best to get this patient into a transplant list but problem is that he has a blood group of all and is the MELD score has been 23 it went up to 30s but I told transplant center that if we can expedite this process somehow we have to go through the MELD score and blood typing and rest of the laboratories, patient may have to be admitted at main campus this Friday and do some emergent procedure or transplant evaluation more quickly, patient was made aware about this phone call after I saw him.  If her transplant is not done his prognosis remains poor, he will go for paracentesis tomorrow.

## 2023-07-13 ENCOUNTER — HOSPITAL ENCOUNTER (OUTPATIENT)
Dept: DATA CONVERSION | Facility: HOSPITAL | Age: 53
End: 2023-07-13
Attending: INTERNAL MEDICINE | Admitting: INTERNAL MEDICINE
Payer: COMMERCIAL

## 2023-07-13 DIAGNOSIS — K74.60 UNSPECIFIED CIRRHOSIS OF LIVER (MULTI): ICD-10-CM

## 2023-07-13 DIAGNOSIS — R18.8 OTHER ASCITES: ICD-10-CM

## 2023-07-14 DIAGNOSIS — R18.8 OTHER ASCITES: ICD-10-CM

## 2023-07-14 DIAGNOSIS — K76.0 NAFLD (NONALCOHOLIC FATTY LIVER DISEASE): ICD-10-CM

## 2023-07-14 DIAGNOSIS — K74.60 HEPATIC CIRRHOSIS, UNSPECIFIED HEPATIC CIRRHOSIS TYPE, UNSPECIFIED WHETHER ASCITES PRESENT (MULTI): ICD-10-CM

## 2023-07-17 ENCOUNTER — PATIENT OUTREACH (OUTPATIENT)
Dept: PRIMARY CARE | Facility: CLINIC | Age: 53
End: 2023-07-17
Payer: COMMERCIAL

## 2023-07-17 DIAGNOSIS — D64.9 LOW HEMOGLOBIN: ICD-10-CM

## 2023-07-17 DIAGNOSIS — D50.0 IRON DEFICIENCY ANEMIA DUE TO CHRONIC BLOOD LOSS: Primary | ICD-10-CM

## 2023-07-17 LAB
EER PETH: NORMAL
PETH 16:0/18:1 (POPETH): <10 NG/ML
PETH 16:0/18:2 (PLPETH): <10 NG/ML

## 2023-07-17 NOTE — PROGRESS NOTES
"Call regarding appt. with PCP on 7/12/23 after hospitalization.  At time of outreach call the patient feels as if their condition has been unchanged since last visit.    States \"not much will change until I get a transplant\".  On another line scheduling parcentesis.  Aware to call with questions or concerns.    "

## 2023-07-19 ENCOUNTER — LAB (OUTPATIENT)
Dept: LAB | Facility: LAB | Age: 53
End: 2023-07-19
Payer: COMMERCIAL

## 2023-07-19 DIAGNOSIS — K74.60 DECOMPENSATION OF CIRRHOSIS OF LIVER (MULTI): ICD-10-CM

## 2023-07-19 DIAGNOSIS — R18.8 OTHER ASCITES: ICD-10-CM

## 2023-07-19 DIAGNOSIS — D64.9 LOW HEMOGLOBIN: ICD-10-CM

## 2023-07-19 DIAGNOSIS — K72.90 DECOMPENSATION OF CIRRHOSIS OF LIVER (MULTI): ICD-10-CM

## 2023-07-19 LAB
BASOPHILS (10*3/UL) IN BLOOD BY AUTOMATED COUNT: 0.04 X10E9/L (ref 0–0.1)
BASOPHILS/100 LEUKOCYTES IN BLOOD BY AUTOMATED COUNT: 0.6 % (ref 0–2)
EOSINOPHILS (10*3/UL) IN BLOOD BY AUTOMATED COUNT: 0.21 X10E9/L (ref 0–0.7)
EOSINOPHILS/100 LEUKOCYTES IN BLOOD BY AUTOMATED COUNT: 3.3 % (ref 0–6)
ERYTHROCYTE DISTRIBUTION WIDTH (RATIO) BY AUTOMATED COUNT: 15 % (ref 11.5–14.5)
ERYTHROCYTE MEAN CORPUSCULAR HEMOGLOBIN CONCENTRATION (G/DL) BY AUTOMATED: 29.9 G/DL (ref 32–36)
ERYTHROCYTE MEAN CORPUSCULAR VOLUME (FL) BY AUTOMATED COUNT: 91 FL (ref 80–100)
ERYTHROCYTES (10*6/UL) IN BLOOD BY AUTOMATED COUNT: 2.35 X10E12/L (ref 4.5–5.9)
HEMATOCRIT (%) IN BLOOD BY AUTOMATED COUNT: 21.4 % (ref 41–52)
HEMOGLOBIN (G/DL) IN BLOOD: 6.4 G/DL (ref 13.5–17.5)
HYPOCHROMIA (PRESENCE) IN BLOOD BY LIGHT MICROSCOPY: NORMAL
IMMATURE GRANULOCYTES/100 LEUKOCYTES IN BLOOD BY AUTOMATED COUNT: 0.3 % (ref 0–0.9)
LEUKOCYTES (10*3/UL) IN BLOOD BY AUTOMATED COUNT: 6.3 X10E9/L (ref 4.4–11.3)
LYMPHOCYTES (10*3/UL) IN BLOOD BY AUTOMATED COUNT: 1.53 X10E9/L (ref 1.2–4.8)
LYMPHOCYTES/100 LEUKOCYTES IN BLOOD BY AUTOMATED COUNT: 24.2 % (ref 13–44)
MONOCYTES (10*3/UL) IN BLOOD BY AUTOMATED COUNT: 0.64 X10E9/L (ref 0.1–1)
MONOCYTES/100 LEUKOCYTES IN BLOOD BY AUTOMATED COUNT: 10.1 % (ref 2–10)
NEUTROPHILS (10*3/UL) IN BLOOD BY AUTOMATED COUNT: 3.89 X10E9/L (ref 1.2–7.7)
NEUTROPHILS/100 LEUKOCYTES IN BLOOD BY AUTOMATED COUNT: 61.5 % (ref 40–80)
PLATELETS (10*3/UL) IN BLOOD AUTOMATED COUNT: 161 X10E9/L (ref 150–450)
POLYCHROMASIA IN BLOOD BY LIGHT MICROSCOPY: NORMAL
RBC MORPHOLOGY IN BLOOD: NORMAL

## 2023-07-19 PROCEDURE — 85025 COMPLETE CBC W/AUTO DIFF WBC: CPT

## 2023-07-19 PROCEDURE — 36415 COLL VENOUS BLD VENIPUNCTURE: CPT

## 2023-07-31 ENCOUNTER — DOCUMENTATION (OUTPATIENT)
Dept: PRIMARY CARE | Facility: CLINIC | Age: 53
End: 2023-07-31
Payer: COMMERCIAL

## 2023-07-31 ENCOUNTER — HOSPITAL ENCOUNTER (OUTPATIENT)
Dept: DATA CONVERSION | Facility: HOSPITAL | Age: 53
End: 2023-07-31
Attending: INTERNAL MEDICINE | Admitting: INTERNAL MEDICINE

## 2023-07-31 DIAGNOSIS — Z76.82 AWAITING ORGAN TRANSPLANT STATUS: ICD-10-CM

## 2023-07-31 DIAGNOSIS — R18.8 CIRRHOSIS OF LIVER WITH ASCITES, UNSPECIFIED HEPATIC CIRRHOSIS TYPE (MULTI): ICD-10-CM

## 2023-07-31 DIAGNOSIS — K76.0 FATTY (CHANGE OF) LIVER, NOT ELSEWHERE CLASSIFIED: ICD-10-CM

## 2023-07-31 DIAGNOSIS — Z79.4 TYPE 2 DIABETES MELLITUS WITH HYPERGLYCEMIA, WITH LONG-TERM CURRENT USE OF INSULIN (MULTI): ICD-10-CM

## 2023-07-31 DIAGNOSIS — R18.8 OTHER ASCITES: ICD-10-CM

## 2023-07-31 DIAGNOSIS — K74.60 CIRRHOSIS OF LIVER WITH ASCITES, UNSPECIFIED HEPATIC CIRRHOSIS TYPE (MULTI): ICD-10-CM

## 2023-07-31 DIAGNOSIS — E11.65 TYPE 2 DIABETES MELLITUS WITH HYPERGLYCEMIA, WITH LONG-TERM CURRENT USE OF INSULIN (MULTI): ICD-10-CM

## 2023-07-31 PROCEDURE — 99490 CHRNC CARE MGMT STAFF 1ST 20: CPT | Performed by: INTERNAL MEDICINE

## 2023-07-31 NOTE — PROGRESS NOTES
Pt agreed to [facility] services. Was happy to tell this nurse he is officially on the liver transplant list. Was currently at the hosp getting drained. Denies any pain at this time. Was upset about paracentesis site. Stated he didn't feel that they were applying the drsg correctly and area started leaking when he was at home. Family is available to help with drsg changes when needed. Pt also stated he now will have to go to [facility] for blood work. Next appointment with PCP on 8/15/23.

## 2023-07-31 NOTE — PROGRESS NOTES
Disenrolled.  Discussed case with [facility] nurse.  Needs transplant and CCM program.    Nonbillable due to billed within last 30 days.  Email to CCM to enroll in program.

## 2023-08-02 LAB
ALANINE AMINOTRANSFERASE (SGPT) (U/L) IN SER/PLAS: 46 U/L (ref 10–52)
ALBUMIN (G/DL) IN SER/PLAS: 3.3 G/DL (ref 3.4–5)
ALKALINE PHOSPHATASE (U/L) IN SER/PLAS: 274 U/L (ref 33–120)
ANION GAP IN SER/PLAS: 9 MMOL/L (ref 10–20)
ASPARTATE AMINOTRANSFERASE (SGOT) (U/L) IN SER/PLAS: 64 U/L (ref 9–39)
BASOPHILS (10*3/UL) IN BLOOD BY AUTOMATED COUNT: 0.08 X10E9/L (ref 0–0.1)
BASOPHILS/100 LEUKOCYTES IN BLOOD BY AUTOMATED COUNT: 1.4 % (ref 0–2)
BILIRUBIN TOTAL (MG/DL) IN SER/PLAS: 1.5 MG/DL (ref 0–1.2)
CALCIUM (MG/DL) IN SER/PLAS: 9.1 MG/DL (ref 8.6–10.3)
CARBON DIOXIDE, TOTAL (MMOL/L) IN SER/PLAS: 21 MMOL/L (ref 21–32)
CHLORIDE (MMOL/L) IN SER/PLAS: 99 MMOL/L (ref 98–107)
CREATININE (MG/DL) IN SER/PLAS: 1.65 MG/DL (ref 0.5–1.3)
EOSINOPHILS (10*3/UL) IN BLOOD BY AUTOMATED COUNT: 0.34 X10E9/L (ref 0–0.7)
EOSINOPHILS/100 LEUKOCYTES IN BLOOD BY AUTOMATED COUNT: 5.8 % (ref 0–6)
ERYTHROCYTE DISTRIBUTION WIDTH (RATIO) BY AUTOMATED COUNT: 16 % (ref 11.5–14.5)
ERYTHROCYTE MEAN CORPUSCULAR HEMOGLOBIN CONCENTRATION (G/DL) BY AUTOMATED: 30.7 G/DL (ref 32–36)
ERYTHROCYTE MEAN CORPUSCULAR VOLUME (FL) BY AUTOMATED COUNT: 94 FL (ref 80–100)
ERYTHROCYTES (10*6/UL) IN BLOOD BY AUTOMATED COUNT: 2.45 X10E12/L (ref 4.5–5.9)
GFR MALE: 49 ML/MIN/1.73M2
GLUCOSE (MG/DL) IN SER/PLAS: 187 MG/DL (ref 74–99)
HEMATOCRIT (%) IN BLOOD BY AUTOMATED COUNT: 23.1 % (ref 41–52)
HEMOGLOBIN (G/DL) IN BLOOD: 7.1 G/DL (ref 13.5–17.5)
IMMATURE GRANULOCYTES/100 LEUKOCYTES IN BLOOD BY AUTOMATED COUNT: 0.3 % (ref 0–0.9)
INR IN PPP BY COAGULATION ASSAY: 1.6 (ref 0.9–1.1)
LEUKOCYTES (10*3/UL) IN BLOOD BY AUTOMATED COUNT: 5.9 X10E9/L (ref 4.4–11.3)
LYMPHOCYTES (10*3/UL) IN BLOOD BY AUTOMATED COUNT: 1.42 X10E9/L (ref 1.2–4.8)
LYMPHOCYTES/100 LEUKOCYTES IN BLOOD BY AUTOMATED COUNT: 24.3 % (ref 13–44)
MONOCYTES (10*3/UL) IN BLOOD BY AUTOMATED COUNT: 0.7 X10E9/L (ref 0.1–1)
MONOCYTES/100 LEUKOCYTES IN BLOOD BY AUTOMATED COUNT: 12 % (ref 2–10)
NEUTROPHILS (10*3/UL) IN BLOOD BY AUTOMATED COUNT: 3.29 X10E9/L (ref 1.2–7.7)
NEUTROPHILS/100 LEUKOCYTES IN BLOOD BY AUTOMATED COUNT: 56.2 % (ref 40–80)
PLATELETS (10*3/UL) IN BLOOD AUTOMATED COUNT: 102 X10E9/L (ref 150–450)
POTASSIUM (MMOL/L) IN SER/PLAS: 5.3 MMOL/L (ref 3.5–5.3)
PROTEIN TOTAL: 5.7 G/DL (ref 6.4–8.2)
PROTHROMBIN TIME (PT) IN PPP BY COAGULATION ASSAY: 18 SEC (ref 9.8–12.8)
SODIUM (MMOL/L) IN SER/PLAS: 124 MMOL/L (ref 136–145)
UREA NITROGEN (MG/DL) IN SER/PLAS: 85 MG/DL (ref 6–23)

## 2023-08-07 DIAGNOSIS — E11.9 CONTROLLED TYPE 2 DIABETES MELLITUS WITHOUT COMPLICATION, WITHOUT LONG-TERM CURRENT USE OF INSULIN (MULTI): Primary | ICD-10-CM

## 2023-08-15 ENCOUNTER — OFFICE VISIT (OUTPATIENT)
Dept: PRIMARY CARE | Facility: CLINIC | Age: 53
End: 2023-08-15
Payer: COMMERCIAL

## 2023-08-15 VITALS
HEIGHT: 68 IN | DIASTOLIC BLOOD PRESSURE: 69 MMHG | BODY MASS INDEX: 30.16 KG/M2 | TEMPERATURE: 96.7 F | HEART RATE: 77 BPM | SYSTOLIC BLOOD PRESSURE: 110 MMHG | WEIGHT: 199 LBS

## 2023-08-15 DIAGNOSIS — D64.9 ANEMIA, UNSPECIFIED TYPE: ICD-10-CM

## 2023-08-15 DIAGNOSIS — E11.9 CONTROLLED TYPE 2 DIABETES MELLITUS WITHOUT COMPLICATION, WITHOUT LONG-TERM CURRENT USE OF INSULIN (MULTI): ICD-10-CM

## 2023-08-15 DIAGNOSIS — K72.90 DECOMPENSATION OF CIRRHOSIS OF LIVER (MULTI): ICD-10-CM

## 2023-08-15 DIAGNOSIS — K76.6 PORTAL HYPERTENSION (MULTI): ICD-10-CM

## 2023-08-15 DIAGNOSIS — K74.60 DECOMPENSATION OF CIRRHOSIS OF LIVER (MULTI): ICD-10-CM

## 2023-08-15 DIAGNOSIS — R18.8 OTHER ASCITES: Primary | ICD-10-CM

## 2023-08-15 PROCEDURE — 3066F NEPHROPATHY DOC TX: CPT | Performed by: INTERNAL MEDICINE

## 2023-08-15 PROCEDURE — 99214 OFFICE O/P EST MOD 30 MIN: CPT | Performed by: INTERNAL MEDICINE

## 2023-08-15 PROCEDURE — 3044F HG A1C LEVEL LT 7.0%: CPT | Performed by: INTERNAL MEDICINE

## 2023-08-15 PROCEDURE — 1036F TOBACCO NON-USER: CPT | Performed by: INTERNAL MEDICINE

## 2023-08-15 PROCEDURE — 3078F DIAST BP <80 MM HG: CPT | Performed by: INTERNAL MEDICINE

## 2023-08-15 PROCEDURE — 3008F BODY MASS INDEX DOCD: CPT | Performed by: INTERNAL MEDICINE

## 2023-08-15 PROCEDURE — 3074F SYST BP LT 130 MM HG: CPT | Performed by: INTERNAL MEDICINE

## 2023-08-15 RX ORDER — SPIRONOLACTONE 25 MG/1
1 TABLET ORAL DAILY
COMMUNITY
Start: 2023-07-03 | End: 2023-10-13 | Stop reason: ALTCHOICE

## 2023-08-15 RX ORDER — LACTULOSE 10 G/15ML
30 SOLUTION ORAL; RECTAL 4 TIMES DAILY
COMMUNITY
Start: 2023-07-27 | End: 2023-08-21 | Stop reason: SDUPTHER

## 2023-08-15 RX ORDER — CYPROHEPTADINE HYDROCHLORIDE 4 MG/1
TABLET ORAL
COMMUNITY
Start: 2023-08-03 | End: 2023-10-13 | Stop reason: ALTCHOICE

## 2023-08-15 RX ORDER — MIDODRINE HYDROCHLORIDE 10 MG/1
10 TABLET ORAL 3 TIMES DAILY
COMMUNITY
Start: 2023-08-14 | End: 2023-11-27 | Stop reason: ALTCHOICE

## 2023-08-15 ASSESSMENT — ENCOUNTER SYMPTOMS
WEIGHT LOSS: 0
ABDOMINAL DISTENTION: 1
ARTHRALGIAS: 1
ANOREXIA: 1
WEAKNESS: 1
FEVER: 0
RESPIRATORY NEGATIVE: 1
VOMITING: 0
CONFUSION: 0
LEG SWELLING: 1
BRUISES/BLEEDS EASILY: 1
ABDOMINAL PAIN: 1
EYES NEGATIVE: 1
FATIGUE: 1
HEMATOCHEZIA: 1
NAUSEA: 1

## 2023-08-15 NOTE — PROGRESS NOTES
"Subjective   Patient ID: Walker Cuenca is a 52 y.o. male who presents for Follow-up (2 mo fu).    Anemia  Presents for follow-up visit. Symptoms include abdominal pain, anorexia, bruises/bleeds easily, leg swelling, malaise/fatigue and pallor. There has been no confusion, fever or weight loss. Signs of blood loss that are present include hematochezia and melena.      Cirrhosis  Patient presents with abdominal pain.  Onset of symptoms was  several weeks before months ago with rapidly worsening course since that time. Patient also notes associated with anorexia, nausea, and vomiting. Symptoms improve with none. Symptoms worsen with  by itself . There is history of  PRESCOTT . There is no history of  anything else . Past history includes ascites and cirrhosis. Previous studies include CT scan, ultrasound, and upper endoscopy.    Review of Systems   Constitutional:  Positive for fatigue and malaise/fatigue. Negative for fever and weight loss.   Eyes: Negative.    Respiratory: Negative.     Cardiovascular:  Positive for leg swelling.   Gastrointestinal:  Positive for abdominal distention, abdominal pain, anorexia, hematochezia, melena and nausea. Negative for vomiting.   Musculoskeletal:  Positive for arthralgias.   Skin:  Positive for pallor.   Neurological:  Positive for weakness.   Hematological:  Bruises/bleeds easily.   Psychiatric/Behavioral:  Negative for confusion.        Objective   /69 (BP Location: Right arm, Patient Position: Sitting, BP Cuff Size: Adult)   Pulse 77   Temp 35.9 °C (96.7 °F) (Temporal)   Ht 1.727 m (5' 8\")   Wt 90.3 kg (199 lb)   BMI 30.26 kg/m²     Physical Exam  Vitals reviewed.   Constitutional:       Appearance: Normal appearance. He is normal weight.   HENT:      Head: Normocephalic and atraumatic.   Eyes:      Conjunctiva/sclera: Conjunctivae normal.   Cardiovascular:      Rate and Rhythm: Normal rate and regular rhythm.      Pulses: Normal pulses.   Pulmonary:      Effort: " Pulmonary effort is normal. No respiratory distress.      Breath sounds: Normal breath sounds.   Abdominal:      General: There is distension.      Tenderness: There is abdominal tenderness.   Musculoskeletal:         General: Normal range of motion.      Cervical back: Normal range of motion.   Skin:     General: Skin is warm and dry.      Findings: Bruising present.   Neurological:      General: No focal deficit present.       Assessment/Plan   Problem List Items Addressed This Visit       Anemia    Decompensation of cirrhosis of liver (CMS/HCC)    Controlled type 2 diabetes mellitus without complication, without long-term current use of insulin (CMS/HCC)    Portal hypertension (CMS/HCC)    Other ascites - Primary   Recent hospitalization data and information reviewed, all reports, labs, radiological investigations and consultations and follow ups reviewed during this visit. Pt is doing well, precautions to be taken in the future for acute ailments or acute illness and change of condition are discussed, will continue to have close follow up, medication list was reviewed and updated and necessary changes were applied.  It is very frustrating to take care of this patient because of his liver disease and I could not get much help last time when he was here in my office through the hepatology department but he ended up in the hospital, he was having severely decompensated liver disease is the MELD score is high, he has a recurrent ascites, his ascites has to be tapped every weekly or sometimes even twice a week, he was hypotensive, he was on vasopressor, he was in ICU, they have to do ascites stepping twice, he received multiple transfusion, interestingly he has been discharged with the 25 mg of spironolactone usually we give Lasix and spironolactone at 4000 ratio so I suggested patient to go back on spironolactone 100 mg, his ascites is going to reaccumulate, he still remains in risk for hepatic transplant, discharge  summary was reviewed, he is on midodrine, today he was not hypotensive, I told him to stop metformin because creatinine went up to 2 and stop Actos also stay only on Tresiba insulin therapy, pharmacy consultation has been requested, he is condition is slightly better than the last time I saw him but he is going to have a recurrent accumulation of ascites and there is always likelihood of spontaneous bacterial peritonitis but it was never proved, he continued to have a frequent and recurrent anemia, hepatic transplant is awaited and otherwise he is going to be like this with back-and-forth hospitalization and abdominal paracentesis requirement and I presume that patient's care has been taken over by hepatology department now and they will arrange abdominal paracentesis on a as needed basis on this patient.  Patient was quite frustrated and upset about the whole situation and he is asking my help in my opinion about the situation I told him that we just have to wait for transplant and they are working hard to get him stabilized still transplant can be done because transplant is only solution to cure his decompensated liver disease I told him.

## 2023-08-16 ENCOUNTER — TELEMEDICINE (OUTPATIENT)
Dept: PHARMACY | Facility: HOSPITAL | Age: 53
End: 2023-08-16
Payer: COMMERCIAL

## 2023-08-16 DIAGNOSIS — E11.9 CONTROLLED TYPE 2 DIABETES MELLITUS WITHOUT COMPLICATION, WITHOUT LONG-TERM CURRENT USE OF INSULIN (MULTI): ICD-10-CM

## 2023-08-16 ASSESSMENT — ENCOUNTER SYMPTOMS: POLYDIPSIA: 1

## 2023-08-16 NOTE — Clinical Note
Discussed with pt about initiating Jardiance. Patient inquired about restarting levothyroxine which he has not been taking since 2/2023.

## 2023-08-16 NOTE — ASSESSMENT & PLAN NOTE
Patient diabetes is currently controlled. A1c from 2 months ago (6/2023) was 6%.   Recent discontinuation of pioglitazone and metformin due to liver cirrhosis (8/15/2023)  Patient needs an A1c recheck before next visit  Patient reports weight gain and fatigue which is resolved by paracentesis every week  Discussed flu vaccination, which patient declined at this visit  Continue:   Tresiba 40 units subcutaneous BID   For future visit, consider:  Initiating Jardiance 10 mg PO daily  All questions and concerns have been addressed  Pt inquired about restarting levothyroxine which patient was unable to get since 2/2023. Deferred to PCP  Follow up visit with pharmacy team: 9/13/2023 at 12 PM

## 2023-08-16 NOTE — PROGRESS NOTES
I reviewed the progress note and agree with the resident’s findings and plans as written. Case discussed with resident.    Earl Flowers, PharmD

## 2023-08-16 NOTE — PATIENT INSTRUCTIONS
Thank you for taking our call today. It is our pleasure to talk to you. We will discuss with your doctor regarding starting Jardiance and will let you know at the next visit. We look forward to talking to you again on September 13, 2023 at 12 PM

## 2023-08-17 DIAGNOSIS — E03.9 ACQUIRED HYPOTHYROIDISM: ICD-10-CM

## 2023-08-17 RX ORDER — LEVOTHYROXINE SODIUM 50 UG/1
50 TABLET ORAL DAILY
Qty: 90 TABLET | Refills: 3 | Status: SHIPPED | OUTPATIENT
Start: 2023-08-17 | End: 2024-03-29 | Stop reason: SDUPTHER

## 2023-08-17 RX ORDER — LEVOTHYROXINE SODIUM 50 UG/1
50 TABLET ORAL DAILY
COMMUNITY
Start: 2020-12-31 | End: 2023-08-17 | Stop reason: SDUPTHER

## 2023-08-21 DIAGNOSIS — K76.82 HEPATIC ENCEPHALOPATHY (MULTI): ICD-10-CM

## 2023-08-21 RX ORDER — LACTULOSE 10 G/15ML
20 SOLUTION ORAL; RECTAL DAILY
Qty: 237 ML | Refills: 3 | Status: SHIPPED | OUTPATIENT
Start: 2023-08-21 | End: 2023-10-12 | Stop reason: SDUPTHER

## 2023-08-22 ENCOUNTER — HOSPITAL ENCOUNTER (OUTPATIENT)
Dept: DATA CONVERSION | Facility: HOSPITAL | Age: 53
End: 2023-08-22
Attending: INTERNAL MEDICINE | Admitting: INTERNAL MEDICINE
Payer: COMMERCIAL

## 2023-08-22 DIAGNOSIS — K76.0 FATTY (CHANGE OF) LIVER, NOT ELSEWHERE CLASSIFIED: ICD-10-CM

## 2023-08-22 DIAGNOSIS — Z76.82 AWAITING ORGAN TRANSPLANT STATUS: ICD-10-CM

## 2023-08-22 DIAGNOSIS — R18.8 OTHER ASCITES: ICD-10-CM

## 2023-08-22 LAB
ALANINE AMINOTRANSFERASE (SGPT) (U/L) IN SER/PLAS: 54 U/L (ref 10–52)
ALBUMIN (G/DL) IN SER/PLAS: 3.3 G/DL (ref 3.4–5)
ALKALINE PHOSPHATASE (U/L) IN SER/PLAS: 330 U/L (ref 33–120)
ANION GAP IN SER/PLAS: 13 MMOL/L (ref 10–20)
ASPARTATE AMINOTRANSFERASE (SGOT) (U/L) IN SER/PLAS: 84 U/L (ref 9–39)
BASOPHILS (10*3/UL) IN BLOOD BY AUTOMATED COUNT: 0.06 X10E9/L (ref 0–0.1)
BASOPHILS/100 LEUKOCYTES IN BLOOD BY AUTOMATED COUNT: 1.4 % (ref 0–2)
BILIRUBIN TOTAL (MG/DL) IN SER/PLAS: 2.8 MG/DL (ref 0–1.2)
CALCIUM (MG/DL) IN SER/PLAS: 8.6 MG/DL (ref 8.6–10.3)
CARBON DIOXIDE, TOTAL (MMOL/L) IN SER/PLAS: 23 MMOL/L (ref 21–32)
CELLS COUNTED TOTAL (#) IN BODY FLUID: 50
CHLORIDE (MMOL/L) IN SER/PLAS: 101 MMOL/L (ref 98–107)
CLARITY FLUID: NORMAL
COLOR OF BODY FLUID: YELLOW
CREATININE (MG/DL) IN SER/PLAS: 1.65 MG/DL (ref 0.5–1.3)
EOSINOPHILS (10*3/UL) IN BLOOD BY AUTOMATED COUNT: 0.21 X10E9/L (ref 0–0.7)
EOSINOPHILS/100 LEUKOCYTES IN BLOOD BY AUTOMATED COUNT: 4.9 % (ref 0–6)
ERYTHROCYTE DISTRIBUTION WIDTH (RATIO) BY AUTOMATED COUNT: 16.6 % (ref 11.5–14.5)
ERYTHROCYTE MEAN CORPUSCULAR HEMOGLOBIN CONCENTRATION (G/DL) BY AUTOMATED: 30.7 G/DL (ref 32–36)
ERYTHROCYTE MEAN CORPUSCULAR VOLUME (FL) BY AUTOMATED COUNT: 94 FL (ref 80–100)
ERYTHROCYTES (/UL) IN BODY FLUID: 2000 /UL
ERYTHROCYTES (10*6/UL) IN BLOOD BY AUTOMATED COUNT: 2.14 X10E12/L (ref 4.5–5.9)
GFR MALE: 49 ML/MIN/1.73M2
GLUCOSE (MG/DL) IN SER/PLAS: 149 MG/DL (ref 74–99)
HEMATOCRIT (%) IN BLOOD BY AUTOMATED COUNT: 20.2 % (ref 41–52)
HEMOGLOBIN (G/DL) IN BLOOD: 6.2 G/DL (ref 13.5–17.5)
IMMATURE GRANULOCYTES/100 LEUKOCYTES IN BLOOD BY AUTOMATED COUNT: 0.2 % (ref 0–0.9)
INR IN PPP BY COAGULATION ASSAY: 1.8 (ref 0.9–1.1)
LEUKOCYTES (/UL) IN BODY FLUID: 87 /UL
LEUKOCYTES (10*3/UL) IN BLOOD BY AUTOMATED COUNT: 4.3 X10E9/L (ref 4.4–11.3)
LYMPHOCYTES (10*3/UL) IN BLOOD BY AUTOMATED COUNT: 0.9 X10E9/L (ref 1.2–4.8)
LYMPHOCYTES/100 LEUKOCYTES IN BLOOD BY AUTOMATED COUNT: 21.1 % (ref 13–44)
LYMPHOCYTES/100 LEUKOCYTES IN BODY FLUID BY MAN CT: 30 %
MONOCYTES (10*3/UL) IN BLOOD BY AUTOMATED COUNT: 0.56 X10E9/L (ref 0.1–1)
MONOCYTES+MACROPHAGES/100 WBC IN BODY FLUID BY MAN CT: 64 %
MONOCYTES/100 LEUKOCYTES IN BLOOD BY AUTOMATED COUNT: 13.1 % (ref 2–10)
NEUTROPHILS (10*3/UL) IN BLOOD BY AUTOMATED COUNT: 2.53 X10E9/L (ref 1.2–7.7)
NEUTROPHILS/100 LEUKOCYTES IN BLOOD BY AUTOMATED COUNT: 59.3 % (ref 40–80)
NEUTROPHILS/100 LEUKOCYTES IN BODY FLUID BY MANUAL COUNT: 6 %
PLATELETS (10*3/UL) IN BLOOD AUTOMATED COUNT: 97 X10E9/L (ref 150–450)
POTASSIUM (MMOL/L) IN SER/PLAS: 3.9 MMOL/L (ref 3.5–5.3)
PROTEIN TOTAL: 5.3 G/DL (ref 6.4–8.2)
PROTHROMBIN TIME (PT) IN PPP BY COAGULATION ASSAY: 20.8 SEC (ref 9.8–12.8)
SODIUM (MMOL/L) IN SER/PLAS: 133 MMOL/L (ref 136–145)
UREA NITROGEN (MG/DL) IN SER/PLAS: 63 MG/DL (ref 6–23)

## 2023-08-25 LAB
GRAM STAIN: NORMAL
STERILE FLUID CULTURE/SMEAR: NORMAL

## 2023-08-29 LAB
ALANINE AMINOTRANSFERASE (SGPT) (U/L) IN SER/PLAS: 52 U/L (ref 10–52)
ALBUMIN (G/DL) IN SER/PLAS: 3.7 G/DL (ref 3.4–5)
ALKALINE PHOSPHATASE (U/L) IN SER/PLAS: 339 U/L (ref 33–120)
ANION GAP IN SER/PLAS: 11 MMOL/L (ref 10–20)
ASPARTATE AMINOTRANSFERASE (SGOT) (U/L) IN SER/PLAS: 60 U/L (ref 9–39)
BASOPHILS (10*3/UL) IN BLOOD BY AUTOMATED COUNT: 0.01 X10E9/L (ref 0–0.1)
BASOPHILS/100 LEUKOCYTES IN BLOOD BY AUTOMATED COUNT: 0.1 % (ref 0–2)
BILIRUBIN TOTAL (MG/DL) IN SER/PLAS: 2.2 MG/DL (ref 0–1.2)
CALCIUM (MG/DL) IN SER/PLAS: 9.1 MG/DL (ref 8.6–10.3)
CARBON DIOXIDE, TOTAL (MMOL/L) IN SER/PLAS: 22 MMOL/L (ref 21–32)
CHLORIDE (MMOL/L) IN SER/PLAS: 97 MMOL/L (ref 98–107)
CREATININE (MG/DL) IN SER/PLAS: 1.66 MG/DL (ref 0.5–1.3)
EOSINOPHILS (10*3/UL) IN BLOOD BY AUTOMATED COUNT: 0.02 X10E9/L (ref 0–0.7)
EOSINOPHILS/100 LEUKOCYTES IN BLOOD BY AUTOMATED COUNT: 0.3 % (ref 0–6)
ERYTHROCYTE DISTRIBUTION WIDTH (RATIO) BY AUTOMATED COUNT: 16.4 % (ref 11.5–14.5)
ERYTHROCYTE MEAN CORPUSCULAR HEMOGLOBIN CONCENTRATION (G/DL) BY AUTOMATED: 31.4 G/DL (ref 32–36)
ERYTHROCYTE MEAN CORPUSCULAR VOLUME (FL) BY AUTOMATED COUNT: 94 FL (ref 80–100)
ERYTHROCYTES (10*6/UL) IN BLOOD BY AUTOMATED COUNT: 2.61 X10E12/L (ref 4.5–5.9)
GFR MALE: 48 ML/MIN/1.73M2
GLUCOSE (MG/DL) IN SER/PLAS: 269 MG/DL (ref 74–99)
HEMATOCRIT (%) IN BLOOD BY AUTOMATED COUNT: 24.5 % (ref 41–52)
HEMOGLOBIN (G/DL) IN BLOOD: 7.7 G/DL (ref 13.5–17.5)
IMMATURE GRANULOCYTES/100 LEUKOCYTES IN BLOOD BY AUTOMATED COUNT: 0.3 % (ref 0–0.9)
INR IN PPP BY COAGULATION ASSAY: 1.7 (ref 0.9–1.1)
LEUKOCYTES (10*3/UL) IN BLOOD BY AUTOMATED COUNT: 7.4 X10E9/L (ref 4.4–11.3)
LYMPHOCYTES (10*3/UL) IN BLOOD BY AUTOMATED COUNT: 1.47 X10E9/L (ref 1.2–4.8)
LYMPHOCYTES/100 LEUKOCYTES IN BLOOD BY AUTOMATED COUNT: 19.9 % (ref 13–44)
MONOCYTES (10*3/UL) IN BLOOD BY AUTOMATED COUNT: 0.75 X10E9/L (ref 0.1–1)
MONOCYTES/100 LEUKOCYTES IN BLOOD BY AUTOMATED COUNT: 10.2 % (ref 2–10)
NEUTROPHILS (10*3/UL) IN BLOOD BY AUTOMATED COUNT: 5.1 X10E9/L (ref 1.2–7.7)
NEUTROPHILS/100 LEUKOCYTES IN BLOOD BY AUTOMATED COUNT: 69.2 % (ref 40–80)
PLATELETS (10*3/UL) IN BLOOD AUTOMATED COUNT: 157 X10E9/L (ref 150–450)
POTASSIUM (MMOL/L) IN SER/PLAS: 4.9 MMOL/L (ref 3.5–5.3)
PROTEIN TOTAL: 6 G/DL (ref 6.4–8.2)
PROTHROMBIN TIME (PT) IN PPP BY COAGULATION ASSAY: 19.5 SEC (ref 9.8–12.8)
SODIUM (MMOL/L) IN SER/PLAS: 125 MMOL/L (ref 136–145)
UREA NITROGEN (MG/DL) IN SER/PLAS: 46 MG/DL (ref 6–23)

## 2023-09-05 ENCOUNTER — HOSPITAL ENCOUNTER (OUTPATIENT)
Dept: DATA CONVERSION | Facility: HOSPITAL | Age: 53
End: 2023-09-05
Attending: INTERNAL MEDICINE | Admitting: INTERNAL MEDICINE
Payer: COMMERCIAL

## 2023-09-05 DIAGNOSIS — Z76.82 AWAITING ORGAN TRANSPLANT STATUS: ICD-10-CM

## 2023-09-05 DIAGNOSIS — K76.0 FATTY (CHANGE OF) LIVER, NOT ELSEWHERE CLASSIFIED: ICD-10-CM

## 2023-09-05 DIAGNOSIS — K76.9 LIVER DISEASE, UNSPECIFIED: ICD-10-CM

## 2023-09-05 DIAGNOSIS — R18.8 OTHER ASCITES: ICD-10-CM

## 2023-09-05 LAB
ALANINE AMINOTRANSFERASE (SGPT) (U/L) IN SER/PLAS: 48 U/L (ref 10–52)
ALBUMIN (G/DL) IN SER/PLAS: 3.4 G/DL (ref 3.4–5)
ALKALINE PHOSPHATASE (U/L) IN SER/PLAS: 375 U/L (ref 33–120)
ANION GAP IN SER/PLAS: 13 MMOL/L (ref 10–20)
ASPARTATE AMINOTRANSFERASE (SGOT) (U/L) IN SER/PLAS: 66 U/L (ref 9–39)
BASOPHILS (10*3/UL) IN BLOOD BY AUTOMATED COUNT: 0.04 X10E9/L (ref 0–0.1)
BASOPHILS/100 LEUKOCYTES IN BLOOD BY AUTOMATED COUNT: 0.7 % (ref 0–2)
BILIRUBIN TOTAL (MG/DL) IN SER/PLAS: 2 MG/DL (ref 0–1.2)
CALCIUM (MG/DL) IN SER/PLAS: 8.8 MG/DL (ref 8.6–10.3)
CARBON DIOXIDE, TOTAL (MMOL/L) IN SER/PLAS: 20 MMOL/L (ref 21–32)
CHLORIDE (MMOL/L) IN SER/PLAS: 98 MMOL/L (ref 98–107)
CREATININE (MG/DL) IN SER/PLAS: 1.86 MG/DL (ref 0.5–1.3)
EOSINOPHILS (10*3/UL) IN BLOOD BY AUTOMATED COUNT: 0.12 X10E9/L (ref 0–0.7)
EOSINOPHILS/100 LEUKOCYTES IN BLOOD BY AUTOMATED COUNT: 2 % (ref 0–6)
ERYTHROCYTE DISTRIBUTION WIDTH (RATIO) BY AUTOMATED COUNT: 19.9 % (ref 11.5–14.5)
ERYTHROCYTE MEAN CORPUSCULAR HEMOGLOBIN CONCENTRATION (G/DL) BY AUTOMATED: 30.9 G/DL (ref 32–36)
ERYTHROCYTE MEAN CORPUSCULAR VOLUME (FL) BY AUTOMATED COUNT: 101 FL (ref 80–100)
ERYTHROCYTES (10*6/UL) IN BLOOD BY AUTOMATED COUNT: 2.34 X10E12/L (ref 4.5–5.9)
GFR MALE: 42 ML/MIN/1.73M2
GLUCOSE (MG/DL) IN SER/PLAS: 101 MG/DL (ref 74–99)
HEMATOCRIT (%) IN BLOOD BY AUTOMATED COUNT: 23.6 % (ref 41–52)
HEMOGLOBIN (G/DL) IN BLOOD: 7.3 G/DL (ref 13.5–17.5)
IMMATURE GRANULOCYTES/100 LEUKOCYTES IN BLOOD BY AUTOMATED COUNT: 0.3 % (ref 0–0.9)
INR IN PPP BY COAGULATION ASSAY: 1.6 (ref 0.9–1.1)
LEUKOCYTES (10*3/UL) IN BLOOD BY AUTOMATED COUNT: 6 X10E9/L (ref 4.4–11.3)
LYMPHOCYTES (10*3/UL) IN BLOOD BY AUTOMATED COUNT: 1.54 X10E9/L (ref 1.2–4.8)
LYMPHOCYTES/100 LEUKOCYTES IN BLOOD BY AUTOMATED COUNT: 25.6 % (ref 13–44)
MONOCYTES (10*3/UL) IN BLOOD BY AUTOMATED COUNT: 0.85 X10E9/L (ref 0.1–1)
MONOCYTES/100 LEUKOCYTES IN BLOOD BY AUTOMATED COUNT: 14.1 % (ref 2–10)
NEUTROPHILS (10*3/UL) IN BLOOD BY AUTOMATED COUNT: 3.45 X10E9/L (ref 1.2–7.7)
NEUTROPHILS/100 LEUKOCYTES IN BLOOD BY AUTOMATED COUNT: 57.3 % (ref 40–80)
PLATELETS (10*3/UL) IN BLOOD AUTOMATED COUNT: 132 X10E9/L (ref 150–450)
POTASSIUM (MMOL/L) IN SER/PLAS: 4.2 MMOL/L (ref 3.5–5.3)
PROTEIN TOTAL: 6 G/DL (ref 6.4–8.2)
PROTHROMBIN TIME (PT) IN PPP BY COAGULATION ASSAY: 18.1 SEC (ref 9.8–12.8)
SODIUM (MMOL/L) IN SER/PLAS: 127 MMOL/L (ref 136–145)
UREA NITROGEN (MG/DL) IN SER/PLAS: 65 MG/DL (ref 6–23)

## 2023-09-07 VITALS — BODY MASS INDEX: 30.63 KG/M2 | HEIGHT: 69 IN | WEIGHT: 206.79 LBS

## 2023-09-07 VITALS — BODY MASS INDEX: 29.68 KG/M2 | HEIGHT: 69 IN | WEIGHT: 200.4 LBS

## 2023-09-11 ENCOUNTER — PATIENT OUTREACH (OUTPATIENT)
Dept: PRIMARY CARE | Facility: CLINIC | Age: 53
End: 2023-09-11
Payer: COMMERCIAL

## 2023-09-11 DIAGNOSIS — R18.8 CIRRHOSIS OF LIVER WITH ASCITES, UNSPECIFIED HEPATIC CIRRHOSIS TYPE (MULTI): ICD-10-CM

## 2023-09-11 DIAGNOSIS — K74.60 CIRRHOSIS OF LIVER WITH ASCITES, UNSPECIFIED HEPATIC CIRRHOSIS TYPE (MULTI): ICD-10-CM

## 2023-09-11 DIAGNOSIS — E11.9 TYPE 2 DIABETES MELLITUS WITHOUT COMPLICATION, WITHOUT LONG-TERM CURRENT USE OF INSULIN (MULTI): ICD-10-CM

## 2023-09-12 ENCOUNTER — HOSPITAL ENCOUNTER (OUTPATIENT)
Dept: DATA CONVERSION | Facility: HOSPITAL | Age: 53
End: 2023-09-12
Attending: INTERNAL MEDICINE | Admitting: INTERNAL MEDICINE
Payer: COMMERCIAL

## 2023-09-12 DIAGNOSIS — Z76.82 AWAITING ORGAN TRANSPLANT STATUS: ICD-10-CM

## 2023-09-12 DIAGNOSIS — K76.0 FATTY (CHANGE OF) LIVER, NOT ELSEWHERE CLASSIFIED: ICD-10-CM

## 2023-09-12 DIAGNOSIS — R14.0 ABDOMINAL DISTENSION (GASEOUS): ICD-10-CM

## 2023-09-12 DIAGNOSIS — R18.8 OTHER ASCITES: ICD-10-CM

## 2023-09-12 LAB
ALANINE AMINOTRANSFERASE (SGPT) (U/L) IN SER/PLAS: 46 U/L (ref 10–52)
ALBUMIN (G/DL) IN SER/PLAS: 3.2 G/DL (ref 3.4–5)
ALKALINE PHOSPHATASE (U/L) IN SER/PLAS: 366 U/L (ref 33–120)
ANION GAP IN SER/PLAS: 13 MMOL/L (ref 10–20)
ASPARTATE AMINOTRANSFERASE (SGOT) (U/L) IN SER/PLAS: 55 U/L (ref 9–39)
BASOPHILS (10*3/UL) IN BLOOD BY AUTOMATED COUNT: 0.04 X10E9/L (ref 0–0.1)
BASOPHILS/100 LEUKOCYTES IN BLOOD BY AUTOMATED COUNT: 0.6 % (ref 0–2)
BILIRUBIN TOTAL (MG/DL) IN SER/PLAS: 1.7 MG/DL (ref 0–1.2)
CALCIUM (MG/DL) IN SER/PLAS: 8.6 MG/DL (ref 8.6–10.3)
CARBON DIOXIDE, TOTAL (MMOL/L) IN SER/PLAS: 18 MMOL/L (ref 21–32)
CHLORIDE (MMOL/L) IN SER/PLAS: 94 MMOL/L (ref 98–107)
CREATININE (MG/DL) IN SER/PLAS: 1.88 MG/DL (ref 0.5–1.3)
EOSINOPHILS (10*3/UL) IN BLOOD BY AUTOMATED COUNT: 0.11 X10E9/L (ref 0–0.7)
EOSINOPHILS/100 LEUKOCYTES IN BLOOD BY AUTOMATED COUNT: 1.7 % (ref 0–6)
ERYTHROCYTE DISTRIBUTION WIDTH (RATIO) BY AUTOMATED COUNT: 17.4 % (ref 11.5–14.5)
ERYTHROCYTE MEAN CORPUSCULAR HEMOGLOBIN CONCENTRATION (G/DL) BY AUTOMATED: 31.8 G/DL (ref 32–36)
ERYTHROCYTE MEAN CORPUSCULAR VOLUME (FL) BY AUTOMATED COUNT: 97 FL (ref 80–100)
ERYTHROCYTES (10*6/UL) IN BLOOD BY AUTOMATED COUNT: 2.26 X10E12/L (ref 4.5–5.9)
GFR MALE: 42 ML/MIN/1.73M2
GLUCOSE (MG/DL) IN SER/PLAS: 226 MG/DL (ref 74–99)
HEMATOCRIT (%) IN BLOOD BY AUTOMATED COUNT: 22 % (ref 41–52)
HEMOGLOBIN (G/DL) IN BLOOD: 7 G/DL (ref 13.5–17.5)
IMMATURE GRANULOCYTES/100 LEUKOCYTES IN BLOOD BY AUTOMATED COUNT: 0.5 % (ref 0–0.9)
INR IN PPP BY COAGULATION ASSAY: 1.7 (ref 0.9–1.1)
LEUKOCYTES (10*3/UL) IN BLOOD BY AUTOMATED COUNT: 6.4 X10E9/L (ref 4.4–11.3)
LYMPHOCYTES (10*3/UL) IN BLOOD BY AUTOMATED COUNT: 1.33 X10E9/L (ref 1.2–4.8)
LYMPHOCYTES/100 LEUKOCYTES IN BLOOD BY AUTOMATED COUNT: 20.7 % (ref 13–44)
MONOCYTES (10*3/UL) IN BLOOD BY AUTOMATED COUNT: 0.76 X10E9/L (ref 0.1–1)
MONOCYTES/100 LEUKOCYTES IN BLOOD BY AUTOMATED COUNT: 11.8 % (ref 2–10)
NEUTROPHILS (10*3/UL) IN BLOOD BY AUTOMATED COUNT: 4.15 X10E9/L (ref 1.2–7.7)
NEUTROPHILS/100 LEUKOCYTES IN BLOOD BY AUTOMATED COUNT: 64.7 % (ref 40–80)
PLATELETS (10*3/UL) IN BLOOD AUTOMATED COUNT: 130 X10E9/L (ref 150–450)
POTASSIUM (MMOL/L) IN SER/PLAS: 4 MMOL/L (ref 3.5–5.3)
PROTEIN TOTAL: 5.5 G/DL (ref 6.4–8.2)
PROTHROMBIN TIME (PT) IN PPP BY COAGULATION ASSAY: 19.8 SEC (ref 9.8–12.8)
SODIUM (MMOL/L) IN SER/PLAS: 121 MMOL/L (ref 136–145)
UREA NITROGEN (MG/DL) IN SER/PLAS: 61 MG/DL (ref 6–23)

## 2023-09-13 ENCOUNTER — OFFICE VISIT (OUTPATIENT)
Dept: PRIMARY CARE | Facility: CLINIC | Age: 53
End: 2023-09-13
Payer: COMMERCIAL

## 2023-09-13 ENCOUNTER — TELEMEDICINE (OUTPATIENT)
Dept: PHARMACY | Facility: HOSPITAL | Age: 53
End: 2023-09-13
Payer: COMMERCIAL

## 2023-09-13 VITALS
DIASTOLIC BLOOD PRESSURE: 68 MMHG | BODY MASS INDEX: 27.89 KG/M2 | HEIGHT: 68 IN | WEIGHT: 184 LBS | SYSTOLIC BLOOD PRESSURE: 120 MMHG | HEART RATE: 98 BPM | TEMPERATURE: 96.9 F

## 2023-09-13 DIAGNOSIS — K72.90 DECOMPENSATION OF CIRRHOSIS OF LIVER (MULTI): Primary | ICD-10-CM

## 2023-09-13 DIAGNOSIS — F51.04 CHRONIC INSOMNIA: ICD-10-CM

## 2023-09-13 DIAGNOSIS — E11.9 CONTROLLED TYPE 2 DIABETES MELLITUS WITHOUT COMPLICATION, WITHOUT LONG-TERM CURRENT USE OF INSULIN (MULTI): Primary | ICD-10-CM

## 2023-09-13 DIAGNOSIS — E11.9 CONTROLLED TYPE 2 DIABETES MELLITUS WITHOUT COMPLICATION, WITHOUT LONG-TERM CURRENT USE OF INSULIN (MULTI): ICD-10-CM

## 2023-09-13 DIAGNOSIS — K76.6 PORTAL HYPERTENSION (MULTI): ICD-10-CM

## 2023-09-13 DIAGNOSIS — R18.8 OTHER ASCITES: ICD-10-CM

## 2023-09-13 DIAGNOSIS — K74.60 DECOMPENSATION OF CIRRHOSIS OF LIVER (MULTI): Primary | ICD-10-CM

## 2023-09-13 DIAGNOSIS — D50.0 IRON DEFICIENCY ANEMIA DUE TO CHRONIC BLOOD LOSS: ICD-10-CM

## 2023-09-13 PROBLEM — D69.6 THROMBOCYTOPENIA, UNSPECIFIED (CMS-HCC): Status: ACTIVE | Noted: 2023-09-13

## 2023-09-13 PROCEDURE — 1036F TOBACCO NON-USER: CPT | Performed by: INTERNAL MEDICINE

## 2023-09-13 PROCEDURE — 3074F SYST BP LT 130 MM HG: CPT | Performed by: INTERNAL MEDICINE

## 2023-09-13 PROCEDURE — 3008F BODY MASS INDEX DOCD: CPT | Performed by: INTERNAL MEDICINE

## 2023-09-13 PROCEDURE — 3044F HG A1C LEVEL LT 7.0%: CPT | Performed by: INTERNAL MEDICINE

## 2023-09-13 PROCEDURE — 99213 OFFICE O/P EST LOW 20 MIN: CPT | Performed by: INTERNAL MEDICINE

## 2023-09-13 PROCEDURE — 3066F NEPHROPATHY DOC TX: CPT | Performed by: INTERNAL MEDICINE

## 2023-09-13 PROCEDURE — 3078F DIAST BP <80 MM HG: CPT | Performed by: INTERNAL MEDICINE

## 2023-09-13 RX ORDER — LORAZEPAM 0.5 MG/1
0.5 TABLET ORAL DAILY PRN
Qty: 30 TABLET | Refills: 0 | Status: SHIPPED | OUTPATIENT
Start: 2023-09-13 | End: 2023-10-13 | Stop reason: ALTCHOICE

## 2023-09-13 ASSESSMENT — ENCOUNTER SYMPTOMS
RESPIRATORY NEGATIVE: 1
BLURRED VISION: 0
WEAKNESS: 1
EYES NEGATIVE: 1
SLEEP DISTURBANCE: 1
CONFUSION: 0
DIZZINESS: 0
GASTROINTESTINAL NEGATIVE: 1
FATIGUE: 1
CARDIOVASCULAR NEGATIVE: 1

## 2023-09-13 NOTE — PROGRESS NOTES
Pharmacy Post-Discharge Visit  Walker Cuenca is a 53 y.o. male was referred to Clinical Pharmacy Team to complete a post-discharge medication optimization and monitoring visit.  The patient was referred for their Diabetes.    Admission Date: 8/8/23  Discharge Date: 8/14/23    Referring Provider: Jackson Damon MD    Subjective   No Known Allergies    [id number] - [place], [place] - [address] [address]  Phone: [phone] Fax: [phone]      Social History     Social History Narrative    Not on file       DISCUSSION:  Patient states he has an appointment with PCP later today and declines to speak to pharmacy after our service was explained to him  Chart Review  Patient's last A1c was 6% (6/20/23) which is very controlled  Can possibly consider adding SGLT-2 inhibitor for added renal benefit as his last GFR was 42 (9/12/23) if patient does not have a history of UTIs.     Objective     There were no vitals taken for this visit.     LAB  Lab Results   Component Value Date    BILITOT 1.7 (H) 09/12/2023    CALCIUM 8.6 09/12/2023    CO2 18 (L) 09/12/2023    CL 94 (L) 09/12/2023    CREATININE 1.88 (H) 09/12/2023    GLUCOSE 226 (H) 09/12/2023    ALKPHOS 366 (H) 09/12/2023    K 4.0 09/12/2023    PROT 5.5 (L) 09/12/2023     (L) 09/12/2023    AST 55 (H) 09/12/2023    ALT 46 09/12/2023    BUN 61 (H) 09/12/2023    ANIONGAP 13 09/12/2023    MG 2.48 (H) 09/09/2023    PHOS 4.2 09/09/2023     (H) 01/26/2022     08/08/2023    LDH CANCELED 08/08/2023    ALBUMIN 3.2 (L) 09/12/2023    AMYLASE 43 07/03/2023    LIPASE 147 (H) 08/07/2023    GFRF CANCELED 08/09/2023    GFRMALE 42 (A) 09/12/2023     Lab Results   Component Value Date    TRIG 89 11/10/2021    CHOL 195 11/10/2021    HDL 48.0 11/10/2021     Lab Results   Component Value Date    HGBA1C 6.0 (A) 06/20/2023         Current Outpatient Medications on File Prior to Visit   Medication Sig Dispense Refill     albuterol 90 mcg/actuation inhaler USE 1-2 PUFFS EVERY 4 HOURS AS NEEDED      atorvastatin (Lipitor) 20 mg tablet Take 1 tablet (20 mg) by mouth.      cholecalciferol (Vitamin D-3) 25 MCG (1000 UT) capsule Take 1 capsule (25 mcg) by mouth once daily.      cyproheptadine (Periactin) 4 mg tablet Take by mouth.      doxepin (SINEquan) 10 mg capsule       furosemide (Lasix) 40 mg tablet PATIENT IS TO TAKE 1.5 TABLETS DAILY. (Patient taking differently: Take 1 tablet (40 mg) by mouth once daily. PATIENT IS TO TAKE one tab.) 135 tablet 3    iron-FA-dha-epa-FAD-NADH-be-mv 1.5 mg iron- 8.73 mg capsule,IR - delay rel,biphase Take by mouth.      lactulose 10 gram/15 mL solution Take 30 mL (20 g) by mouth once daily. 237 mL 3    levothyroxine (Synthroid, Levoxyl) 50 mcg tablet Take by mouth.      levothyroxine (Synthroid, Levoxyl) 50 mcg tablet Take 1 tablet (50 mcg) by mouth once daily. 90 tablet 3    midodrine (Proamatine) 10 mg tablet Take 1 tablet (10 mg) by mouth 3 times a day.      Nitrostat 0.4 mg SL tablet Place under the tongue.      OneTouch Ultra Test strip Place 60 strips on the skin at noon and 60 strips in the evening. 200 strip 3    pantoprazole (ProtoNix) 40 mg EC tablet Take 1 tablet (40 mg) by mouth once daily.      propranolol (Inderal) 10 mg tablet Take 1 tablet (10 mg) by mouth 3 times a day.      rifAXIMin (Xifaxan) 550 mg tablet Take by mouth twice a day.      spironolactone (Aldactone) 25 mg tablet Take 1 tablet (25 mg) by mouth once daily.      traZODone (Desyrel) 50 mg tablet Take 1 tablet (50 mg) by mouth once daily as needed.      Tresiba FlexTouch U-100 100 unit/mL (3 mL) injection inject 40 units subcutaneously twice a day       No current facility-administered medications on file prior to visit.     No pharmacy follow up will be scheduled at this time.     Continue all meds under the continuation of care with the referring provider and clinical pharmacy team.    Radha Suarez, PharmD     Verbal  consent to manage patient's drug therapy was obtained from the patient. They were informed they may decline to participate or withdraw from participation in pharmacy services at any time.

## 2023-09-13 NOTE — PROGRESS NOTES
Subjective   Patient ID: Walker Cuenca is a 53 y.o. male who presents for Follow-up (1 mo fu).    Diabetes  He presents for his follow-up diabetic visit. He has type 2 diabetes mellitus. His disease course has been stable. Pertinent negatives for hypoglycemia include no confusion or dizziness. Associated symptoms include fatigue and weakness. Pertinent negatives for diabetes include no blurred vision, no chest pain and no foot paresthesias. There are no hypoglycemic complications. Symptoms are stable. There are no diabetic complications. Risk factors for coronary artery disease include diabetes mellitus and male sex. He is compliant with treatment most of the time. An ACE inhibitor/angiotensin II receptor blocker is being taken.      Anemia  Presents for follow-up visit. Symptoms include abdominal pain, anorexia, bruises/bleeds easily, leg swelling, malaise/fatigue and pallor. There has been no confusion, fever or weight loss. Signs of blood loss that are present include hematochezia and melena.      Cirrhosis  Patient presents with abdominal pain.  Onset of symptoms was several weeks before months ago with rapidly worsening course since that time. Patient also notes associated with anorexia, nausea, and vomiting. Symptoms improve with none. Symptoms worsen with by itself. There is history of PRESCOTT. There is no history of anything else. Past history includes ascites and cirrhosis. Previous studies include CT scan, ultrasound, and upper endoscopy.    Review of Systems   Constitutional:  Positive for fatigue.   HENT: Negative.     Eyes: Negative.  Negative for blurred vision.   Respiratory: Negative.     Cardiovascular: Negative.  Negative for chest pain.   Gastrointestinal: Negative.    Skin: Negative.    Neurological:  Positive for weakness. Negative for dizziness.   Psychiatric/Behavioral:  Positive for sleep disturbance. Negative for confusion.        Objective   /68 (BP Location: Left arm, Patient Position:  "Sitting, BP Cuff Size: Adult)   Pulse 98   Temp 36.1 °C (96.9 °F) (Temporal)   Ht 1.727 m (5' 8\")   Wt 83.5 kg (184 lb)   BMI 27.98 kg/m²     Physical Exam  Vitals reviewed.   Constitutional:       Appearance: Normal appearance. He is normal weight. He is ill-appearing. He is not toxic-appearing.   HENT:      Head: Normocephalic.   Eyes:      Comments: PALLOR   Cardiovascular:      Rate and Rhythm: Normal rate and regular rhythm.   Pulmonary:      Effort: Pulmonary effort is normal.      Breath sounds: Rales present.   Abdominal:      General: There is distension.      Tenderness: There is no abdominal tenderness.   Musculoskeletal:      Cervical back: Normal range of motion.   Skin:     General: Skin is warm and dry.   Neurological:      General: No focal deficit present.   Psychiatric:         Mood and Affect: Mood is anxious.       Assessment/Plan   Problem List Items Addressed This Visit       Anemia    Chronic insomnia    Decompensation of cirrhosis of liver (CMS/HCC) - Primary    Controlled type 2 diabetes mellitus without complication, without long-term current use of insulin (CMS/HCC)    Portal hypertension (CMS/HCC)    Other ascites   MELD score was 24, he has a 10 L of ascites removed yesterday, he is still struggling, he has been having terrific insomnia, none of the therapeutics has worked so I gave him a lorazepam just to have a some good sleep he can attain and lorazepam could be safe with his chronic liver disease.  He has a decompensated liver disease with cirrhosis, portal hypertension, recurrent ascites.  He continues to be anemic, last hemoglobin was 7, his blood sugars is not issue, sodium level was 121.  Recent hepatology follow-up was reviewed and as he was in my room he got a call from transplant center that there is a potential liver available for him so I told patient to proceed with the hospitalization if everything goes well he could get a liver transplant, we hope for the best for " this patient because his quality of life has been significantly hampered from this chronic liver disease that relatively young age.  He will inform us about happenings happening to him if he is going to get liver transplant.

## 2023-09-19 ENCOUNTER — HOSPITAL ENCOUNTER (OUTPATIENT)
Dept: DATA CONVERSION | Facility: HOSPITAL | Age: 53
End: 2023-09-19
Attending: INTERNAL MEDICINE | Admitting: INTERNAL MEDICINE
Payer: COMMERCIAL

## 2023-09-19 DIAGNOSIS — K76.0 FATTY (CHANGE OF) LIVER, NOT ELSEWHERE CLASSIFIED: ICD-10-CM

## 2023-09-19 DIAGNOSIS — Z76.82 AWAITING ORGAN TRANSPLANT STATUS: ICD-10-CM

## 2023-09-19 DIAGNOSIS — R18.8 OTHER ASCITES: ICD-10-CM

## 2023-09-19 LAB
ALANINE AMINOTRANSFERASE (SGPT) (U/L) IN SER/PLAS: 43 U/L (ref 10–52)
ALBUMIN (G/DL) IN SER/PLAS: 3 G/DL (ref 3.4–5)
ALKALINE PHOSPHATASE (U/L) IN SER/PLAS: 291 U/L (ref 33–120)
ANION GAP IN SER/PLAS: 13 MMOL/L (ref 10–20)
ASPARTATE AMINOTRANSFERASE (SGOT) (U/L) IN SER/PLAS: 48 U/L (ref 9–39)
BASOPHILS (10*3/UL) IN BLOOD BY AUTOMATED COUNT: 0.02 X10E9/L (ref 0–0.1)
BASOPHILS/100 LEUKOCYTES IN BLOOD BY AUTOMATED COUNT: 0.2 % (ref 0–2)
BILIRUBIN TOTAL (MG/DL) IN SER/PLAS: 2.4 MG/DL (ref 0–1.2)
CALCIUM (MG/DL) IN SER/PLAS: 8.5 MG/DL (ref 8.6–10.3)
CARBON DIOXIDE, TOTAL (MMOL/L) IN SER/PLAS: 19 MMOL/L (ref 21–32)
CHLORIDE (MMOL/L) IN SER/PLAS: 92 MMOL/L (ref 98–107)
CREATININE (MG/DL) IN SER/PLAS: 2.3 MG/DL (ref 0.5–1.3)
EOSINOPHILS (10*3/UL) IN BLOOD BY AUTOMATED COUNT: 0.08 X10E9/L (ref 0–0.7)
EOSINOPHILS/100 LEUKOCYTES IN BLOOD BY AUTOMATED COUNT: 0.9 % (ref 0–6)
ERYTHROCYTE DISTRIBUTION WIDTH (RATIO) BY AUTOMATED COUNT: 15.6 % (ref 11.5–14.5)
ERYTHROCYTE MEAN CORPUSCULAR HEMOGLOBIN CONCENTRATION (G/DL) BY AUTOMATED: 31.9 G/DL (ref 32–36)
ERYTHROCYTE MEAN CORPUSCULAR VOLUME (FL) BY AUTOMATED COUNT: 97 FL (ref 80–100)
ERYTHROCYTES (10*6/UL) IN BLOOD BY AUTOMATED COUNT: 2.35 X10E12/L (ref 4.5–5.9)
GFR MALE: 33 ML/MIN/1.73M2
GLUCOSE (MG/DL) IN SER/PLAS: 138 MG/DL (ref 74–99)
HEMATOCRIT (%) IN BLOOD BY AUTOMATED COUNT: 22.9 % (ref 41–52)
HEMOGLOBIN (G/DL) IN BLOOD: 7.3 G/DL (ref 13.5–17.5)
IMMATURE GRANULOCYTES/100 LEUKOCYTES IN BLOOD BY AUTOMATED COUNT: 0.2 % (ref 0–0.9)
INR IN PPP BY COAGULATION ASSAY: 1.9 (ref 0.9–1.1)
LEUKOCYTES (10*3/UL) IN BLOOD BY AUTOMATED COUNT: 8.6 X10E9/L (ref 4.4–11.3)
LYMPHOCYTES (10*3/UL) IN BLOOD BY AUTOMATED COUNT: 1.25 X10E9/L (ref 1.2–4.8)
LYMPHOCYTES/100 LEUKOCYTES IN BLOOD BY AUTOMATED COUNT: 14.6 % (ref 13–44)
MONOCYTES (10*3/UL) IN BLOOD BY AUTOMATED COUNT: 1.21 X10E9/L (ref 0.1–1)
MONOCYTES/100 LEUKOCYTES IN BLOOD BY AUTOMATED COUNT: 14.2 % (ref 2–10)
NEUTROPHILS (10*3/UL) IN BLOOD BY AUTOMATED COUNT: 5.97 X10E9/L (ref 1.2–7.7)
NEUTROPHILS/100 LEUKOCYTES IN BLOOD BY AUTOMATED COUNT: 69.9 % (ref 40–80)
PLATELETS (10*3/UL) IN BLOOD AUTOMATED COUNT: 142 X10E9/L (ref 150–450)
POTASSIUM (MMOL/L) IN SER/PLAS: 4.8 MMOL/L (ref 3.5–5.3)
PROTEIN TOTAL: 5.3 G/DL (ref 6.4–8.2)
PROTHROMBIN TIME (PT) IN PPP BY COAGULATION ASSAY: 21.7 SEC (ref 9.8–12.8)
SODIUM (MMOL/L) IN SER/PLAS: 119 MMOL/L (ref 136–145)
UREA NITROGEN (MG/DL) IN SER/PLAS: 61 MG/DL (ref 6–23)

## 2023-09-25 ENCOUNTER — DOCUMENTATION (OUTPATIENT)
Dept: PRIMARY CARE | Facility: CLINIC | Age: 53
End: 2023-09-25
Payer: COMMERCIAL

## 2023-09-25 ENCOUNTER — PATIENT OUTREACH (OUTPATIENT)
Dept: PRIMARY CARE | Facility: CLINIC | Age: 53
End: 2023-09-25
Payer: COMMERCIAL

## 2023-09-25 NOTE — PROGRESS NOTES
Discharge facility: [facility]  Discharge diagnosis: hyponatremia, anemia, elevated creatinine  Admission date: 9/19/23  Discharge date:  9/22/23    PCP Appointment Date: TBD  Specialist Appointment Date: Cardiology scheduled 10/2/23  Hospital Encounter and Summary: Linked  See Discharge assessment below for further details     Engagement  Call Start Time: 1242 (9/25/2023 12:50 PM)    Medications  Medications reviewed with patient/caregiver?: No (9/25/2023 12:50 PM)  Is the patient having any side effects they believe may be caused by any medication additions or changes?: No (9/25/2023 12:50 PM)  Does the patient have all medications ordered at discharge?: Yes (9/25/2023 12:50 PM)  Care Management Interventions: No intervention needed (9/25/2023 12:50 PM)  Is the patient taking all medications as directed (includes completed medication regime)?: Yes (9/25/2023 12:50 PM)  Care Management Interventions: Provided patient education (9/25/2023 12:50 PM)    Appointments  Does the patient have a primary care provider?: Yes (9/25/2023 12:50 PM)  Care Management Interventions: Educated patient on importance of making appointment (9/25/2023 12:50 PM)  Has the patient kept scheduled appointments due by today?: Yes (9/25/2023 12:50 PM)  Care Management Interventions: Educated on importance of keeping appointment (9/25/2023 12:50 PM)  Follow Up Tasks: Appointments (9/25/2023 12:50 PM)    Self Management  What is the home health agency?: NA (9/25/2023 12:50 PM)  Has home health visited the patient within 72 hours of discharge?: Not applicable (9/25/2023 12:50 PM)  What Durable Medical Equipment (DME) was ordered?: NA (9/25/2023 12:50 PM)    Patient Teaching  Does the patient have access to their discharge instructions?: Yes (9/25/2023 12:50 PM)  Care Management Interventions: Reviewed instructions with patient (9/25/2023 12:50 PM)  What is the patient's perception of their health status since discharge?: Same (9/25/2023  12:50 PM)  Is the patient/caregiver able to teach back the hierarchy of who to call/visit for symptoms/problems? PCP, Specialist, Home Health nurse, Urgent Care, ED, 911: Yes (9/25/2023 12:50 PM)    Wrap Up  Is the patient/caregiver familiar with Advance Care Planning?: Yes (9/25/2023 12:50 PM)  Would the patient like more information on Advance Care Planning?: No (9/25/2023 12:50 PM)  Wrap Up Additional Comments: Pt is doing well. Meds were just reviewed and updated with PCP this month per pt and no changes noted. (9/25/2023 12:50 PM)  Call End Time: 1248 (9/25/2023 12:50 PM)

## 2023-09-26 ENCOUNTER — HOSPITAL ENCOUNTER (OUTPATIENT)
Dept: DATA CONVERSION | Facility: HOSPITAL | Age: 53
End: 2023-09-26
Attending: INTERNAL MEDICINE | Admitting: INTERNAL MEDICINE
Payer: COMMERCIAL

## 2023-09-26 DIAGNOSIS — R18.8 OTHER ASCITES: ICD-10-CM

## 2023-09-26 DIAGNOSIS — Z76.82 AWAITING ORGAN TRANSPLANT STATUS: ICD-10-CM

## 2023-09-26 DIAGNOSIS — K76.0 FATTY (CHANGE OF) LIVER, NOT ELSEWHERE CLASSIFIED: ICD-10-CM

## 2023-09-26 LAB
ALANINE AMINOTRANSFERASE (SGPT) (U/L) IN SER/PLAS: 37 U/L (ref 10–52)
ALBUMIN (G/DL) IN SER/PLAS: 3.5 G/DL (ref 3.4–5)
ALKALINE PHOSPHATASE (U/L) IN SER/PLAS: 320 U/L (ref 33–120)
ANION GAP IN SER/PLAS: 10 MMOL/L (ref 10–20)
ASPARTATE AMINOTRANSFERASE (SGOT) (U/L) IN SER/PLAS: 50 U/L (ref 9–39)
BASOPHILS (10*3/UL) IN BLOOD BY AUTOMATED COUNT: 0.04 X10E9/L (ref 0–0.1)
BASOPHILS/100 LEUKOCYTES IN BLOOD BY AUTOMATED COUNT: 0.8 % (ref 0–2)
BILIRUBIN TOTAL (MG/DL) IN SER/PLAS: 2 MG/DL (ref 0–1.2)
CALCIUM (MG/DL) IN SER/PLAS: 8.7 MG/DL (ref 8.6–10.3)
CARBON DIOXIDE, TOTAL (MMOL/L) IN SER/PLAS: 19 MMOL/L (ref 21–32)
CHLORIDE (MMOL/L) IN SER/PLAS: 100 MMOL/L (ref 98–107)
CREATININE (MG/DL) IN SER/PLAS: 1.64 MG/DL (ref 0.5–1.3)
EOSINOPHILS (10*3/UL) IN BLOOD BY AUTOMATED COUNT: 0.12 X10E9/L (ref 0–0.7)
EOSINOPHILS/100 LEUKOCYTES IN BLOOD BY AUTOMATED COUNT: 2.4 % (ref 0–6)
ERYTHROCYTE DISTRIBUTION WIDTH (RATIO) BY AUTOMATED COUNT: 14.9 % (ref 11.5–14.5)
ERYTHROCYTE MEAN CORPUSCULAR HEMOGLOBIN CONCENTRATION (G/DL) BY AUTOMATED: 30.5 G/DL (ref 32–36)
ERYTHROCYTE MEAN CORPUSCULAR VOLUME (FL) BY AUTOMATED COUNT: 96 FL (ref 80–100)
ERYTHROCYTES (10*6/UL) IN BLOOD BY AUTOMATED COUNT: 2.72 X10E12/L (ref 4.5–5.9)
GFR MALE: 49 ML/MIN/1.73M2
GLUCOSE (MG/DL) IN SER/PLAS: 145 MG/DL (ref 74–99)
HEMATOCRIT (%) IN BLOOD BY AUTOMATED COUNT: 26.2 % (ref 41–52)
HEMOGLOBIN (G/DL) IN BLOOD: 8 G/DL (ref 13.5–17.5)
IMMATURE GRANULOCYTES/100 LEUKOCYTES IN BLOOD BY AUTOMATED COUNT: 0.2 % (ref 0–0.9)
INR IN PPP BY COAGULATION ASSAY: 1.8 (ref 0.9–1.1)
LEUKOCYTES (10*3/UL) IN BLOOD BY AUTOMATED COUNT: 4.9 X10E9/L (ref 4.4–11.3)
LYMPHOCYTES (10*3/UL) IN BLOOD BY AUTOMATED COUNT: 0.98 X10E9/L (ref 1.2–4.8)
LYMPHOCYTES/100 LEUKOCYTES IN BLOOD BY AUTOMATED COUNT: 19.8 % (ref 13–44)
MONOCYTES (10*3/UL) IN BLOOD BY AUTOMATED COUNT: 0.57 X10E9/L (ref 0.1–1)
MONOCYTES/100 LEUKOCYTES IN BLOOD BY AUTOMATED COUNT: 11.5 % (ref 2–10)
NEUTROPHILS (10*3/UL) IN BLOOD BY AUTOMATED COUNT: 3.22 X10E9/L (ref 1.2–7.7)
NEUTROPHILS/100 LEUKOCYTES IN BLOOD BY AUTOMATED COUNT: 65.3 % (ref 40–80)
PLATELETS (10*3/UL) IN BLOOD AUTOMATED COUNT: 135 X10E9/L (ref 150–450)
POTASSIUM (MMOL/L) IN SER/PLAS: 4.9 MMOL/L (ref 3.5–5.3)
PROTEIN TOTAL: 5.7 G/DL (ref 6.4–8.2)
PROTHROMBIN TIME (PT) IN PPP BY COAGULATION ASSAY: 19.9 SEC (ref 9.8–12.8)
SODIUM (MMOL/L) IN SER/PLAS: 124 MMOL/L (ref 136–145)
UREA NITROGEN (MG/DL) IN SER/PLAS: 48 MG/DL (ref 6–23)

## 2023-09-29 ENCOUNTER — TELEPHONE (OUTPATIENT)
Dept: TRANSPLANT | Facility: HOSPITAL | Age: 53
End: 2023-09-29

## 2023-09-29 ENCOUNTER — PATIENT OUTREACH (OUTPATIENT)
Dept: PRIMARY CARE | Facility: CLINIC | Age: 53
End: 2023-09-29
Payer: COMMERCIAL

## 2023-09-29 VITALS — HEIGHT: 69 IN | BODY MASS INDEX: 28.08 KG/M2 | WEIGHT: 189.6 LBS

## 2023-09-29 VITALS — BODY MASS INDEX: 30.3 KG/M2 | HEIGHT: 69 IN | WEIGHT: 204.59 LBS

## 2023-09-29 VITALS — BODY MASS INDEX: 28.11 KG/M2 | HEIGHT: 69 IN | WEIGHT: 189.82 LBS

## 2023-09-29 VITALS — BODY MASS INDEX: 30.3 KG/M2 | WEIGHT: 204.59 LBS | HEIGHT: 69 IN

## 2023-09-29 VITALS — BODY MASS INDEX: 31.71 KG/M2 | HEIGHT: 69 IN | WEIGHT: 214.07 LBS

## 2023-09-29 VITALS — BODY MASS INDEX: 30.6 KG/M2 | HEIGHT: 69 IN | WEIGHT: 206.57 LBS

## 2023-09-29 VITALS — HEIGHT: 69 IN | BODY MASS INDEX: 32.13 KG/M2 | WEIGHT: 216.93 LBS

## 2023-09-29 DIAGNOSIS — E11.9 TYPE 2 DIABETES MELLITUS WITHOUT COMPLICATION, WITHOUT LONG-TERM CURRENT USE OF INSULIN (MULTI): ICD-10-CM

## 2023-09-29 DIAGNOSIS — K74.60 CIRRHOSIS OF LIVER WITH ASCITES, UNSPECIFIED HEPATIC CIRRHOSIS TYPE (MULTI): ICD-10-CM

## 2023-09-29 DIAGNOSIS — R18.8 CIRRHOSIS OF LIVER WITH ASCITES, UNSPECIFIED HEPATIC CIRRHOSIS TYPE (MULTI): ICD-10-CM

## 2023-09-30 PROBLEM — K75.81 LIVER CIRRHOSIS SECONDARY TO NONALCOHOLIC STEATOHEPATITIS (NASH) (MULTI): Status: ACTIVE | Noted: 2023-09-30

## 2023-09-30 PROBLEM — D62 ANEMIA DUE TO ACUTE BLOOD LOSS: Status: ACTIVE | Noted: 2023-09-30

## 2023-09-30 PROBLEM — K21.9 GERD (GASTROESOPHAGEAL REFLUX DISEASE): Status: ACTIVE | Noted: 2023-09-30

## 2023-09-30 PROBLEM — K74.60 LIVER CIRRHOSIS SECONDARY TO NONALCOHOLIC STEATOHEPATITIS (NASH) (MULTI): Status: ACTIVE | Noted: 2023-09-30

## 2023-09-30 PROBLEM — F41.8 ANXIETY WITH DEPRESSION: Status: ACTIVE | Noted: 2023-09-30

## 2023-09-30 PROBLEM — E87.1 HYPONATREMIA: Status: ACTIVE | Noted: 2023-04-24

## 2023-09-30 PROBLEM — K92.1 MELENA: Status: ACTIVE | Noted: 2023-09-30

## 2023-09-30 PROBLEM — I11.9 HYPERTENSIVE HEART DISEASE WITHOUT HEART FAILURE: Status: ACTIVE | Noted: 2023-09-30

## 2023-09-30 PROBLEM — R18.8 REFRACTORY ASCITES: Status: ACTIVE | Noted: 2023-09-30

## 2023-09-30 PROBLEM — R94.5 NONSPECIFIC ABNORMAL RESULTS OF LIVER FUNCTION STUDY: Status: ACTIVE | Noted: 2023-09-30

## 2023-09-30 PROBLEM — K75.81 NASH (NONALCOHOLIC STEATOHEPATITIS): Status: ACTIVE | Noted: 2023-08-25

## 2023-09-30 PROBLEM — R73.9 HYPERGLYCEMIA: Status: ACTIVE | Noted: 2023-09-13

## 2023-09-30 PROBLEM — K76.7 HEPATORENAL SYNDROME (MULTI): Status: ACTIVE | Noted: 2023-09-30

## 2023-09-30 PROBLEM — K92.2 GASTROINTESTINAL HEMORRHAGE: Status: ACTIVE | Noted: 2023-08-26

## 2023-09-30 PROBLEM — K76.82 HEPATIC ENCEPHALOPATHY (MULTI): Status: ACTIVE | Noted: 2023-09-30

## 2023-09-30 PROBLEM — R91.8 GROUND GLASS OPACITY PRESENT ON IMAGING OF LUNG: Status: ACTIVE | Noted: 2023-09-30

## 2023-09-30 PROBLEM — E66.811 OBESITY, CLASS I, BMI 30-34.9: Status: ACTIVE | Noted: 2018-04-10

## 2023-09-30 PROBLEM — D64.9 SYMPTOMATIC ANEMIA: Status: ACTIVE | Noted: 2023-08-25

## 2023-09-30 PROBLEM — K65.2 SPONTANEOUS BACTERIAL PERITONITIS (MULTI): Status: ACTIVE | Noted: 2023-09-30

## 2023-09-30 PROBLEM — J44.89 CHRONIC OBSTRUCTIVE AIRWAY DISEASE WITH ASTHMA (MULTI): Status: ACTIVE | Noted: 2023-09-30

## 2023-09-30 PROBLEM — R18.8 ASCITES OF LIVER: Status: ACTIVE | Noted: 2023-09-30

## 2023-09-30 PROBLEM — F33.0 MAJOR DEPRESSIVE DISORDER, RECURRENT EPISODE, MILD (CMS-HCC): Status: ACTIVE | Noted: 2023-09-30

## 2023-09-30 PROBLEM — E66.9 OBESITY, CLASS I, BMI 30-34.9: Status: ACTIVE | Noted: 2018-04-10

## 2023-09-30 PROBLEM — N17.9 ACUTE RENAL FAILURE (CMS-HCC): Status: ACTIVE | Noted: 2023-09-30

## 2023-09-30 PROBLEM — D50.9 IRON DEFICIENCY ANEMIA: Status: ACTIVE | Noted: 2023-09-30

## 2023-09-30 PROBLEM — K64.4 EXTERNAL HEMORRHOIDS: Status: ACTIVE | Noted: 2023-09-30

## 2023-09-30 PROBLEM — Z94.9 TRANSPLANT: Status: ACTIVE | Noted: 2023-09-30

## 2023-09-30 PROBLEM — J84.01: Status: ACTIVE | Noted: 2023-09-30

## 2023-09-30 PROBLEM — R18.8 ABDOMINAL ASCITES: Status: ACTIVE | Noted: 2023-09-30

## 2023-09-30 RX ORDER — CLINDAMYCIN PHOSPHATE 10 UG/ML
LOTION TOPICAL
COMMUNITY
Start: 2023-09-07 | End: 2023-10-13 | Stop reason: ALTCHOICE

## 2023-09-30 RX ORDER — PANTOPRAZOLE SODIUM 40 MG/1
40 TABLET, DELAYED RELEASE ORAL 2 TIMES DAILY
COMMUNITY
End: 2024-03-29 | Stop reason: SDUPTHER

## 2023-09-30 RX ORDER — SPIRONOLACTONE 50 MG/1
1 TABLET, FILM COATED ORAL DAILY
COMMUNITY
Start: 2022-11-19 | End: 2023-10-13 | Stop reason: ALTCHOICE

## 2023-09-30 RX ORDER — LANOLIN ALCOHOL/MO/W.PET/CERES
1000 CREAM (GRAM) TOPICAL DAILY
COMMUNITY
End: 2024-04-29 | Stop reason: ALTCHOICE

## 2023-09-30 RX ORDER — FERROUS SULFATE 325(65) MG
1 TABLET ORAL DAILY
COMMUNITY
End: 2023-11-27 | Stop reason: ALTCHOICE

## 2023-09-30 RX ORDER — CARVEDILOL 3.12 MG/1
3.12 TABLET ORAL
COMMUNITY
Start: 2023-08-28 | End: 2023-11-27 | Stop reason: DRUGHIGH

## 2023-09-30 RX ORDER — LANOLIN ALCOHOL/MO/W.PET/CERES
1000 CREAM (GRAM) TOPICAL
COMMUNITY
Start: 2023-08-28 | End: 2023-10-10

## 2023-09-30 RX ORDER — HYDROCORTISONE 25 MG/G
CREAM TOPICAL
COMMUNITY
Start: 2023-08-23 | End: 2023-11-27 | Stop reason: ALTCHOICE

## 2023-09-30 RX ORDER — SEMAGLUTIDE 1.34 MG/ML
0.5 INJECTION, SOLUTION SUBCUTANEOUS
COMMUNITY
Start: 2022-10-19 | End: 2023-10-13 | Stop reason: ALTCHOICE

## 2023-09-30 RX ORDER — METFORMIN HYDROCHLORIDE 1000 MG/1
0.5 TABLET ORAL 2 TIMES DAILY
COMMUNITY
Start: 2020-12-29 | End: 2023-10-13 | Stop reason: ALTCHOICE

## 2023-09-30 RX ORDER — CIPROFLOXACIN 750 MG/1
500 TABLET, FILM COATED ORAL EVERY 12 HOURS
COMMUNITY
End: 2023-10-13 | Stop reason: ALTCHOICE

## 2023-09-30 RX ORDER — ACETAMINOPHEN 500 MG
50 TABLET ORAL DAILY
COMMUNITY
End: 2024-04-29 | Stop reason: ALTCHOICE

## 2023-09-30 RX ORDER — ORAL SEMAGLUTIDE 7 MG/1
1 TABLET ORAL DAILY
COMMUNITY
Start: 2022-11-14 | End: 2023-10-13 | Stop reason: ALTCHOICE

## 2023-09-30 RX ORDER — PROPRANOLOL HYDROCHLORIDE 20 MG/1
20 TABLET ORAL EVERY 8 HOURS
COMMUNITY
End: 2023-10-13 | Stop reason: ALTCHOICE

## 2023-09-30 RX ORDER — FUROSEMIDE 40 MG/1
1 TABLET ORAL DAILY
COMMUNITY
Start: 2023-08-14 | End: 2023-10-13 | Stop reason: ALTCHOICE

## 2023-09-30 RX ORDER — SILDENAFIL 100 MG/1
100 TABLET, FILM COATED ORAL AS NEEDED
COMMUNITY
Start: 2019-03-28 | End: 2023-10-13 | Stop reason: ALTCHOICE

## 2023-09-30 RX ORDER — SPIRONOLACTONE 100 MG/1
1.5 TABLET, FILM COATED ORAL 2 TIMES DAILY
COMMUNITY
Start: 2022-04-25 | End: 2023-10-13 | Stop reason: ALTCHOICE

## 2023-09-30 RX ORDER — VALACYCLOVIR HYDROCHLORIDE 1 G/1
TABLET, FILM COATED ORAL
COMMUNITY
Start: 2023-08-21 | End: 2023-10-13 | Stop reason: ALTCHOICE

## 2023-09-30 RX ORDER — METFORMIN HYDROCHLORIDE 500 MG/1
1 TABLET ORAL DAILY
COMMUNITY
Start: 2020-07-22 | End: 2023-10-13 | Stop reason: ALTCHOICE

## 2023-09-30 RX ORDER — TORSEMIDE 10 MG/1
10 TABLET ORAL DAILY
COMMUNITY
End: 2023-10-13 | Stop reason: ALTCHOICE

## 2023-09-30 RX ORDER — INSULIN GLARGINE 100 [IU]/ML
30 INJECTION, SOLUTION SUBCUTANEOUS 2 TIMES DAILY
COMMUNITY
Start: 2023-01-03 | End: 2023-11-27 | Stop reason: ALTCHOICE

## 2023-09-30 RX ORDER — SPIRONOLACTONE 100 MG/1
2 TABLET, FILM COATED ORAL DAILY
COMMUNITY
End: 2023-10-13 | Stop reason: SINTOL

## 2023-09-30 RX ORDER — ASCORBIC ACID 500 MG
1 TABLET ORAL
COMMUNITY
Start: 2023-08-28 | End: 2024-04-29 | Stop reason: ALTCHOICE

## 2023-09-30 RX ORDER — CHOLESTYRAMINE 4 G/9G
POWDER, FOR SUSPENSION ORAL
COMMUNITY
Start: 2023-09-11 | End: 2023-11-27 | Stop reason: ALTCHOICE

## 2023-09-30 RX ORDER — POLYETHYLENE GLYCOL 3350 17 G/17G
17 POWDER, FOR SOLUTION ORAL 2 TIMES DAILY
COMMUNITY
Start: 2023-06-05 | End: 2023-10-13 | Stop reason: ALTCHOICE

## 2023-09-30 RX ORDER — BENZONATATE 200 MG/1
200 CAPSULE ORAL 3 TIMES DAILY PRN
COMMUNITY
Start: 2023-06-11 | End: 2023-10-12

## 2023-09-30 NOTE — H&P
History & Physical Reviewed:   I have reviewed the History and Physical dated:  23-May-2023   History and Physical reviewed and relevant findings noted. Patient examined to review pertinent physical  findings.: No significant changes   Home Medications Reviewed: no changes noted   Allergies Reviewed: no changes noted       ERAS (Enhanced Recovery After Surgery):  ·  ERAS Patient: no     Consent:   COVID-19 Consent:  ·  COVID-19 Risk Consent Surgeon has reviewed key risks related to the risk of uvaldo COVID-19 and if they contract COVID-19 what the risks are.       Electronic Signatures:  Kael Mosley)  (Signed 09-Jun-2023 08:56)   Authored: History & Physical Reviewed, ERAS, Consent,  Note Completion      Last Updated: 09-Jun-2023 08:56 by Kael Mosley ()

## 2023-10-03 ENCOUNTER — APPOINTMENT (OUTPATIENT)
Dept: RADIOLOGY | Facility: HOSPITAL | Age: 53
End: 2023-10-03
Payer: COMMERCIAL

## 2023-10-09 ENCOUNTER — PATIENT OUTREACH (OUTPATIENT)
Dept: PRIMARY CARE | Facility: CLINIC | Age: 53
End: 2023-10-09
Payer: COMMERCIAL

## 2023-10-09 DIAGNOSIS — Z79.4 TYPE 2 DIABETES MELLITUS WITH OTHER SPECIFIED COMPLICATION, WITH LONG-TERM CURRENT USE OF INSULIN (MULTI): ICD-10-CM

## 2023-10-09 DIAGNOSIS — K62.5 GASTROINTESTINAL HEMORRHAGE ASSOCIATED WITH ANORECTAL SOURCE: ICD-10-CM

## 2023-10-09 DIAGNOSIS — E11.69 TYPE 2 DIABETES MELLITUS WITH OTHER SPECIFIED COMPLICATION, WITH LONG-TERM CURRENT USE OF INSULIN (MULTI): ICD-10-CM

## 2023-10-09 NOTE — PROGRESS NOTES
Spoke with pt this morning. Recently got out of the hospital for GI bleed. Stated he didn't feel well today and didn't feel up to talking. Reviewed dc medications only: Zoloft 25mg daily, Coreg 3.125mg BID, and Cipro 500mg daily. Told nurse they did an EGD and colonoscopy while in the hosp which still did not show the source of bleeding. He also had received 1 unit blood transfusion. Denies having any bleeding since he's been home from the hospital. Pt still waiting for liver transplant. Several follow ups and scheduled labs noted.  Scheduled appt with PCP on 10/13.

## 2023-10-10 ENCOUNTER — APPOINTMENT (OUTPATIENT)
Dept: RADIOLOGY | Facility: HOSPITAL | Age: 53
End: 2023-10-10
Payer: COMMERCIAL

## 2023-10-10 ENCOUNTER — HOSPITAL ENCOUNTER (OUTPATIENT)
Dept: RADIOLOGY | Facility: HOSPITAL | Age: 53
Discharge: HOME | End: 2023-10-10
Payer: COMMERCIAL

## 2023-10-10 ENCOUNTER — LAB (OUTPATIENT)
Dept: LAB | Facility: LAB | Age: 53
End: 2023-10-10
Payer: COMMERCIAL

## 2023-10-10 VITALS
DIASTOLIC BLOOD PRESSURE: 57 MMHG | OXYGEN SATURATION: 96 % | HEIGHT: 69 IN | RESPIRATION RATE: 19 BRPM | WEIGHT: 192.68 LBS | SYSTOLIC BLOOD PRESSURE: 101 MMHG | HEART RATE: 82 BPM | TEMPERATURE: 97.2 F | BODY MASS INDEX: 28.54 KG/M2

## 2023-10-10 DIAGNOSIS — K76.0 FATTY (CHANGE OF) LIVER, NOT ELSEWHERE CLASSIFIED: ICD-10-CM

## 2023-10-10 DIAGNOSIS — K76.0 FATTY (CHANGE OF) LIVER, NOT ELSEWHERE CLASSIFIED: Primary | ICD-10-CM

## 2023-10-10 DIAGNOSIS — Z76.82 AWAITING ORGAN TRANSPLANT STATUS: ICD-10-CM

## 2023-10-10 DIAGNOSIS — R18.8 OTHER ASCITES: ICD-10-CM

## 2023-10-10 LAB
ALBUMIN SERPL BCP-MCNC: 3.3 G/DL (ref 3.4–5)
ALP SERPL-CCNC: 288 U/L (ref 33–120)
ALT SERPL W P-5'-P-CCNC: 50 U/L (ref 10–52)
ANION GAP SERPL CALC-SCNC: 10 MMOL/L (ref 10–20)
AST SERPL W P-5'-P-CCNC: 66 U/L (ref 9–39)
BASOPHILS # BLD AUTO: 0.04 X10*3/UL (ref 0–0.1)
BASOPHILS NFR BLD AUTO: 0.8 %
BILIRUB SERPL-MCNC: 1.6 MG/DL (ref 0–1.2)
BUN SERPL-MCNC: 55 MG/DL (ref 6–23)
CALCIUM SERPL-MCNC: 8.8 MG/DL (ref 8.6–10.3)
CHLORIDE SERPL-SCNC: 104 MMOL/L (ref 98–107)
CO2 SERPL-SCNC: 19 MMOL/L (ref 21–32)
CREAT SERPL-MCNC: 1.8 MG/DL (ref 0.5–1.3)
EOSINOPHIL # BLD AUTO: 0.14 X10*3/UL (ref 0–0.7)
EOSINOPHIL NFR BLD AUTO: 2.9 %
ERYTHROCYTE [DISTWIDTH] IN BLOOD BY AUTOMATED COUNT: 15.2 % (ref 11.5–14.5)
GFR SERPL CREATININE-BSD FRML MDRD: 44 ML/MIN/1.73M*2
GLUCOSE SERPL-MCNC: 160 MG/DL (ref 74–99)
HCT VFR BLD AUTO: 24.2 % (ref 41–52)
HGB BLD-MCNC: 7.5 G/DL (ref 13.5–17.5)
IMM GRANULOCYTES # BLD AUTO: 0.02 X10*3/UL (ref 0–0.7)
IMM GRANULOCYTES NFR BLD AUTO: 0.4 % (ref 0–0.9)
INR PPP: 1.8 (ref 0.9–1.1)
LYMPHOCYTES # BLD AUTO: 1.19 X10*3/UL (ref 1.2–4.8)
LYMPHOCYTES NFR BLD AUTO: 24.2 %
MCH RBC QN AUTO: 29.9 PG (ref 26–34)
MCHC RBC AUTO-ENTMCNC: 31 G/DL (ref 32–36)
MCV RBC AUTO: 96 FL (ref 80–100)
MONOCYTES # BLD AUTO: 0.61 X10*3/UL (ref 0.1–1)
MONOCYTES NFR BLD AUTO: 12.4 %
NEUTROPHILS # BLD AUTO: 2.91 X10*3/UL (ref 1.2–7.7)
NEUTROPHILS NFR BLD AUTO: 59.3 %
NRBC BLD-RTO: 0 /100 WBCS (ref 0–0)
PLATELET # BLD AUTO: 118 X10*3/UL (ref 150–450)
PMV BLD AUTO: 11.2 FL (ref 7.5–11.5)
POTASSIUM SERPL-SCNC: 5.8 MMOL/L (ref 3.5–5.3)
PROT SERPL-MCNC: 5.2 G/DL (ref 6.4–8.2)
PROTHROMBIN TIME: 20.8 SECONDS (ref 9.8–12.8)
RBC # BLD AUTO: 2.51 X10*6/UL (ref 4.5–5.9)
SODIUM SERPL-SCNC: 127 MMOL/L (ref 136–145)
WBC # BLD AUTO: 4.9 X10*3/UL (ref 4.4–11.3)

## 2023-10-10 PROCEDURE — 96372 THER/PROPH/DIAG INJ SC/IM: CPT | Mod: 59 | Performed by: RADIOLOGY

## 2023-10-10 PROCEDURE — 36415 COLL VENOUS BLD VENIPUNCTURE: CPT

## 2023-10-10 PROCEDURE — 2720000007 HC OR 272 NO HCPCS

## 2023-10-10 PROCEDURE — 85610 PROTHROMBIN TIME: CPT

## 2023-10-10 PROCEDURE — 2500000004 HC RX 250 GENERAL PHARMACY W/ HCPCS (ALT 636 FOR OP/ED): Mod: JZ | Performed by: INTERNAL MEDICINE

## 2023-10-10 PROCEDURE — C1729 CATH, DRAINAGE: HCPCS

## 2023-10-10 PROCEDURE — 49083 ABD PARACENTESIS W/IMAGING: CPT

## 2023-10-10 PROCEDURE — 49083 ABD PARACENTESIS W/IMAGING: CPT | Performed by: RADIOLOGY

## 2023-10-10 PROCEDURE — 2500000005 HC RX 250 GENERAL PHARMACY W/O HCPCS: Performed by: RADIOLOGY

## 2023-10-10 PROCEDURE — 85025 COMPLETE CBC W/AUTO DIFF WBC: CPT

## 2023-10-10 PROCEDURE — 80053 COMPREHEN METABOLIC PANEL: CPT

## 2023-10-10 PROCEDURE — P9047 ALBUMIN (HUMAN), 25%, 50ML: HCPCS | Mod: JZ | Performed by: INTERNAL MEDICINE

## 2023-10-10 RX ORDER — ALBUMIN HUMAN 250 G/1000ML
62.5 SOLUTION INTRAVENOUS ONCE
Status: COMPLETED | OUTPATIENT
Start: 2023-10-10 | End: 2023-10-10

## 2023-10-10 RX ORDER — LIDOCAINE HYDROCHLORIDE AND EPINEPHRINE 10; 10 MG/ML; UG/ML
INJECTION, SOLUTION INFILTRATION; PERINEURAL AS NEEDED
Status: COMPLETED | OUTPATIENT
Start: 2023-10-10 | End: 2023-10-10

## 2023-10-10 RX ADMIN — LIDOCAINE HYDROCHLORIDE,EPINEPHRINE BITARTRATE 2 ML: 10; .01 INJECTION, SOLUTION INFILTRATION; PERINEURAL at 12:15

## 2023-10-10 RX ADMIN — ALBUMIN HUMAN 62.5 G: 0.25 SOLUTION INTRAVENOUS at 13:55

## 2023-10-10 ASSESSMENT — PAIN SCALES - GENERAL
PAINLEVEL_OUTOF10: 5 - MODERATE PAIN
PAINLEVEL_OUTOF10: 0 - NO PAIN

## 2023-10-10 ASSESSMENT — COLUMBIA-SUICIDE SEVERITY RATING SCALE - C-SSRS
1. IN THE PAST MONTH, HAVE YOU WISHED YOU WERE DEAD OR WISHED YOU COULD GO TO SLEEP AND NOT WAKE UP?: NO
6. HAVE YOU EVER DONE ANYTHING, STARTED TO DO ANYTHING, OR PREPARED TO DO ANYTHING TO END YOUR LIFE?: NO
2. HAVE YOU ACTUALLY HAD ANY THOUGHTS OF KILLING YOURSELF?: NO

## 2023-10-10 ASSESSMENT — PAIN - FUNCTIONAL ASSESSMENT
PAIN_FUNCTIONAL_ASSESSMENT: 0-10

## 2023-10-10 ASSESSMENT — PAIN DESCRIPTION - DESCRIPTORS: DESCRIPTORS: PRESSURE

## 2023-10-10 NOTE — POST-PROCEDURE NOTE
Interventional Radiology Brief Postprocedure Note    Attending: Edwin Rivera MD    Diagnosis: Ascites     Description of procedure: paracentesis     Anesthesia:  Local    Complications: None    Estimated Blood Loss: none    Medications (Filter: Administrations occurring from 1203 to 1218 on 10/10/23) As of 10/10/23 1218      lidocaine-epinephrine (Xylocaine W/EPI) 1 %-1:100,000 injection (mL) Total volume:  2 mL      Date/Time Rate/Dose/Volume Action       10/10/23  1215 2 mL Given                   No specimens collected      See detailed result report with images in PACS.    The patient tolerated the procedure well without incident or complication and is in stable condition.

## 2023-10-10 NOTE — Clinical Note
Patient in US procedure room for paracentesis.  VSS  Patient states he was inpatient at [facility] and they did paracentesis on Monday, October 2 removed 4000 ml.

## 2023-10-10 NOTE — Clinical Note
Dr. Rivera in room to discuss/explain procedure.  Patient stated understanding and consent signed on paper.

## 2023-10-10 NOTE — Clinical Note
Lidocaine administered to numb site  Paracentesis catheter inserted with pink tinged ascites draining via renova pump.  Patient tolerated well.

## 2023-10-10 NOTE — Clinical Note
Paracentesis completed as 10,000 ml removed and order states to not remove more than 10 liters.  Catheter removed.  Site covered with 4x4 and tegaderm.

## 2023-10-12 ENCOUNTER — OFFICE VISIT (OUTPATIENT)
Dept: TRANSPLANT | Facility: HOSPITAL | Age: 53
End: 2023-10-12
Payer: COMMERCIAL

## 2023-10-12 VITALS
WEIGHT: 177.5 LBS | OXYGEN SATURATION: 100 % | SYSTOLIC BLOOD PRESSURE: 93 MMHG | HEART RATE: 66 BPM | BODY MASS INDEX: 26.21 KG/M2 | TEMPERATURE: 97.4 F | DIASTOLIC BLOOD PRESSURE: 50 MMHG

## 2023-10-12 DIAGNOSIS — K76.82 HEPATIC ENCEPHALOPATHY (MULTI): ICD-10-CM

## 2023-10-12 DIAGNOSIS — K72.90 DECOMPENSATION OF CIRRHOSIS OF LIVER (MULTI): Primary | ICD-10-CM

## 2023-10-12 DIAGNOSIS — K74.60 DECOMPENSATION OF CIRRHOSIS OF LIVER (MULTI): Primary | ICD-10-CM

## 2023-10-12 DIAGNOSIS — K75.81 NASH (NONALCOHOLIC STEATOHEPATITIS): ICD-10-CM

## 2023-10-12 DIAGNOSIS — Z76.82 PRE-LIVER TRANSPLANT, LISTED: ICD-10-CM

## 2023-10-12 PROCEDURE — 99215 OFFICE O/P EST HI 40 MIN: CPT

## 2023-10-12 PROCEDURE — 3078F DIAST BP <80 MM HG: CPT | Performed by: INTERNAL MEDICINE

## 2023-10-12 PROCEDURE — 3008F BODY MASS INDEX DOCD: CPT | Performed by: INTERNAL MEDICINE

## 2023-10-12 PROCEDURE — 1036F TOBACCO NON-USER: CPT | Performed by: INTERNAL MEDICINE

## 2023-10-12 PROCEDURE — 3066F NEPHROPATHY DOC TX: CPT | Performed by: INTERNAL MEDICINE

## 2023-10-12 PROCEDURE — 3044F HG A1C LEVEL LT 7.0%: CPT | Performed by: INTERNAL MEDICINE

## 2023-10-12 PROCEDURE — 3074F SYST BP LT 130 MM HG: CPT | Performed by: INTERNAL MEDICINE

## 2023-10-12 RX ORDER — LACTULOSE 10 G/15ML
20 SOLUTION ORAL; RECTAL 3 TIMES DAILY
Qty: 2700 ML | Refills: 11 | Status: SHIPPED | OUTPATIENT
Start: 2023-10-12 | End: 2023-10-13 | Stop reason: SDUPTHER

## 2023-10-12 RX ORDER — LACTULOSE 10 G/15ML
20 SOLUTION ORAL; RECTAL 3 TIMES DAILY
Qty: 2700 ML | Refills: 11 | Status: SHIPPED | OUTPATIENT
Start: 2023-10-12 | End: 2023-10-12 | Stop reason: SDUPTHER

## 2023-10-12 ASSESSMENT — ENCOUNTER SYMPTOMS
AGITATION: 0
ABDOMINAL PAIN: 1
SEIZURES: 0
SHORTNESS OF BREATH: 0
RESPIRATORY NEGATIVE: 1
APPETITE CHANGE: 0
SPEECH DIFFICULTY: 0
FATIGUE: 1
COUGH: 0
ACTIVITY CHANGE: 0
ABDOMINAL DISTENTION: 1

## 2023-10-12 ASSESSMENT — PAIN SCALES - GENERAL: PAINLEVEL: 0-NO PAIN

## 2023-10-12 NOTE — ASSESSMENT & PLAN NOTE
We await the [facility] heart evaluation outcome before the committee weighs in on reactivation  MELD/US/AFP every 6 months to assess liver function and survey for HCC  Weekly cbc and chem 7  Paracentesis and hospital admission as needed  We asked him to consolidate care through one hepatologist and he will continue to do so through our office  Return to the office in 6 weeks

## 2023-10-12 NOTE — PROGRESS NOTES
Subjective    He requires parecentesis weekly. Labs including Hb and sodium are stably low. Work up at the clinic has detected some significant CAD and committee there planned a PET heart which is being done today. He is pleading to have a larger volume of fluid removed as he gets anorexic and nauseous between taps.    Review of Systems   Constitutional:  Positive for fatigue. Negative for activity change and appetite change.   HENT: Negative.     Respiratory: Negative.  Negative for cough and shortness of breath.    Gastrointestinal:  Positive for abdominal distention and abdominal pain.   Endocrine: Positive for cold intolerance.   Skin: Negative.    Neurological:  Negative for seizures and speech difficulty.   Psychiatric/Behavioral:  Negative for agitation and behavioral problems.        MELD 3.0: 27 at 10/10/2023  9:36 AM  MELD-Na: 27 at 10/10/2023  9:36 AM  Calculated from:  Serum Creatinine: 1.80 mg/dL at 10/10/2023  9:36 AM  Serum Sodium: 127 mmol/L at 10/10/2023  9:36 AM  Total Bilirubin: 1.6 mg/dL at 10/10/2023  9:36 AM  Serum Albumin: 3.3 g/dL at 10/10/2023  9:36 AM  INR(ratio): 1.8 at 10/10/2023  9:36 AM  Age at listin years  Sex: Male at 10/10/2023  9:36 AM       Physical Exam  HENT:      Head: Normocephalic and atraumatic.      Mouth/Throat:      Pharynx: Oropharynx is clear.   Eyes:      General: No scleral icterus.     Extraocular Movements: Extraocular movements intact.      Pupils: Pupils are equal, round, and reactive to light.   Cardiovascular:      Rate and Rhythm: Normal rate and regular rhythm.   Pulmonary:      Effort: No respiratory distress.      Breath sounds: Normal breath sounds.   Abdominal:      General: Bowel sounds are normal.      Tenderness: There is abdominal tenderness in the left lower quadrant.      Comments: There is no hepatomegaly  Splenomegaly is noted  There is shifting dullness and a fluid wave   Skin:     Findings: No erythema or rash.   Neurological:      General:  No focal deficit present.      Mental Status: He is alert.   Psychiatric:         Mood and Affect: Mood normal.         Behavior: Behavior normal.         Decompensation of cirrhosis of liver (CMS/HCC)  We await the [facility] heart evaluation outcome before the committee weighs in on reactivation  MELD/US/AFP every 6 months to assess liver function and survey for HCC  Weekly cbc and chem 7  Paracentesis and hospital admission as needed  We asked him to consolidate care through one hepatologist and he will continue to do so through our office  Return to the office in 6 weeks

## 2023-10-13 ENCOUNTER — OFFICE VISIT (OUTPATIENT)
Dept: PRIMARY CARE | Facility: CLINIC | Age: 53
End: 2023-10-13
Payer: COMMERCIAL

## 2023-10-13 VITALS
TEMPERATURE: 96.9 F | BODY MASS INDEX: 26.51 KG/M2 | HEIGHT: 69 IN | HEART RATE: 65 BPM | SYSTOLIC BLOOD PRESSURE: 110 MMHG | WEIGHT: 179 LBS | DIASTOLIC BLOOD PRESSURE: 67 MMHG

## 2023-10-13 DIAGNOSIS — I10 ESSENTIAL HYPERTENSION: ICD-10-CM

## 2023-10-13 DIAGNOSIS — K74.60 DECOMPENSATION OF CIRRHOSIS OF LIVER (MULTI): ICD-10-CM

## 2023-10-13 DIAGNOSIS — Z79.4 TYPE 2 DIABETES MELLITUS WITH STAGE 3A CHRONIC KIDNEY DISEASE, WITH LONG-TERM CURRENT USE OF INSULIN (MULTI): ICD-10-CM

## 2023-10-13 DIAGNOSIS — E11.22 TYPE 2 DIABETES MELLITUS WITH STAGE 3A CHRONIC KIDNEY DISEASE, WITH LONG-TERM CURRENT USE OF INSULIN (MULTI): ICD-10-CM

## 2023-10-13 DIAGNOSIS — I25.10 ATHEROSCLEROSIS OF NATIVE CORONARY ARTERY OF NATIVE HEART WITHOUT ANGINA PECTORIS: Primary | ICD-10-CM

## 2023-10-13 DIAGNOSIS — R18.8 REFRACTORY ASCITES: ICD-10-CM

## 2023-10-13 DIAGNOSIS — K72.90 DECOMPENSATION OF CIRRHOSIS OF LIVER (MULTI): ICD-10-CM

## 2023-10-13 DIAGNOSIS — N18.31 TYPE 2 DIABETES MELLITUS WITH STAGE 3A CHRONIC KIDNEY DISEASE, WITH LONG-TERM CURRENT USE OF INSULIN (MULTI): ICD-10-CM

## 2023-10-13 DIAGNOSIS — E11.9 CONTROLLED TYPE 2 DIABETES MELLITUS WITHOUT COMPLICATION, WITHOUT LONG-TERM CURRENT USE OF INSULIN (MULTI): ICD-10-CM

## 2023-10-13 DIAGNOSIS — K76.6 PORTAL HYPERTENSION (MULTI): ICD-10-CM

## 2023-10-13 DIAGNOSIS — K76.82 HEPATIC ENCEPHALOPATHY (MULTI): ICD-10-CM

## 2023-10-13 PROBLEM — J84.01: Status: RESOLVED | Noted: 2023-09-30 | Resolved: 2023-10-13

## 2023-10-13 PROCEDURE — 3044F HG A1C LEVEL LT 7.0%: CPT | Performed by: INTERNAL MEDICINE

## 2023-10-13 PROCEDURE — 3008F BODY MASS INDEX DOCD: CPT | Performed by: INTERNAL MEDICINE

## 2023-10-13 PROCEDURE — 3078F DIAST BP <80 MM HG: CPT | Performed by: INTERNAL MEDICINE

## 2023-10-13 PROCEDURE — 3066F NEPHROPATHY DOC TX: CPT | Performed by: INTERNAL MEDICINE

## 2023-10-13 PROCEDURE — 3074F SYST BP LT 130 MM HG: CPT | Performed by: INTERNAL MEDICINE

## 2023-10-13 PROCEDURE — 99214 OFFICE O/P EST MOD 30 MIN: CPT | Performed by: INTERNAL MEDICINE

## 2023-10-13 PROCEDURE — 1036F TOBACCO NON-USER: CPT | Performed by: INTERNAL MEDICINE

## 2023-10-13 RX ORDER — LACTULOSE 10 G/15ML
20 SOLUTION ORAL; RECTAL 3 TIMES DAILY
Qty: 946 ML | Refills: 11 | Status: SHIPPED | OUTPATIENT
Start: 2023-10-13 | End: 2023-11-27 | Stop reason: ALTCHOICE

## 2023-10-13 ASSESSMENT — ENCOUNTER SYMPTOMS
CARDIOVASCULAR NEGATIVE: 1
RESPIRATORY NEGATIVE: 1
TREMORS: 1
ABDOMINAL DISTENTION: 1
BRUISES/BLEEDS EASILY: 1
SEIZURES: 0
HEADACHES: 0
BLURRED VISION: 1
CONFUSION: 0
VISUAL CHANGE: 1
POLYPHAGIA: 0
WEAKNESS: 1
FATIGUE: 1
SWEATS: 0
POLYDIPSIA: 1
MUSCULOSKELETAL NEGATIVE: 1
SPEECH DIFFICULTY: 0
ABDOMINAL PAIN: 1
WEIGHT LOSS: 1
NERVOUS/ANXIOUS: 0
CONFUSION: 1
HUNGER: 0
BLACKOUTS: 0
DIZZINESS: 1

## 2023-10-13 NOTE — PROGRESS NOTES
Subjective   Patient ID: Walker Cuenca is a 53 y.o. male who presents for Follow-up (1 mo fu).    Diabetes  No MedicAlert identification noted. The initial diagnosis of diabetes was made 8 years ago. Hypoglycemia symptoms include dizziness, sleepiness and tremors. Pertinent negatives for hypoglycemia include no confusion, headaches, hunger, mood changes, nervousness/anxiousness, pallor, seizures, speech difficulty or sweats. Associated symptoms include blurred vision, fatigue, foot paresthesias, polyuria, visual change, weakness and weight loss. Pertinent negatives for diabetes include no chest pain, no foot ulcerations and no polyphagia. Pertinent negatives for hypoglycemia complications include no blackouts, no hospitalization, no nocturnal hypoglycemia, no required assistance and no required glucagon injection. Symptoms are stable. Pertinent negatives for diabetic complications include no CVA, heart disease, impotence, nephropathy, peripheral neuropathy, PVD or retinopathy. Risk factors for coronary artery disease include dyslipidemia, family history, obesity, sedentary lifestyle and stress. Current diabetic treatment includes oral agent (monotherapy). He is compliant with treatment all of the time. He is currently taking insulin pre-breakfast and pre-dinner. Insulin injections are given by patient. Rotation sites for injection include the thighs. His weight is decreasing steadily. He is following a diabetic and low salt diet. Meal planning includes avoidance of concentrated sweets and calorie counting. He has had a previous visit with a dietitian. He never participates in exercise. He monitors blood glucose at home 3-4 x per week. He monitors urine at home <1 x per month. Blood glucose monitoring compliance is inadequate. His home blood glucose trend is fluctuating dramatically. His breakfast blood glucose is taken between 7-8 am. His breakfast blood glucose range is generally 180-200 mg/dl. His lunch blood  glucose range is generally >200 mg/dl. His dinner blood glucose is taken between 7-8 pm. His dinner blood glucose range is generally >200 mg/dl. His overall blood glucose range is >200 mg/dl.   Anemia  Presents for follow-up visit. Symptoms include abdominal pain, bruises/bleeds easily and weight loss. There has been no confusion or pallor. Signs of blood loss that are present include melena. Side effects of medications include fatigue.   Heart Problem  This is a chronic problem. The current episode started more than 1 year ago. The problem occurs intermittently. Associated symptoms include abdominal pain, fatigue, a visual change and weakness. Pertinent negatives include no chest pain or headaches. The treatment provided moderate relief.    Anemia  Presents for follow-up visit. Symptoms include abdominal pain, anorexia, bruises/bleeds easily, leg swelling, malaise/fatigue and pallor. There has been no confusion, fever or weight loss. Signs of blood loss that are present include hematochezia and melena.      Cirrhosis  Patient presents with abdominal pain.  Onset of symptoms was several weeks before months ago with rapidly worsening course since that time. Patient also notes associated with anorexia, nausea, and vomiting. Symptoms improve with none. Symptoms worsen with by itself. There is history of PRESCOTT. There is no history of anything else. Past history includes ascites and cirrhosis. Previous studies include CT scan, ultrasound, and upper endoscopy.    Review of Systems   Constitutional:  Positive for fatigue and weight loss.   Eyes:  Positive for blurred vision.   Respiratory: Negative.     Cardiovascular: Negative.  Negative for chest pain.   Gastrointestinal:  Positive for abdominal distention, abdominal pain and melena.   Endocrine: Positive for polyuria. Negative for polyphagia.   Genitourinary:  Negative for impotence.   Musculoskeletal: Negative.    Skin: Negative.  Negative for pallor.   Neurological:   "Positive for dizziness, tremors and weakness. Negative for seizures, speech difficulty and headaches.   Hematological:  Bruises/bleeds easily.   Psychiatric/Behavioral:  Negative for confusion. The patient is not nervous/anxious.        Objective   /67 (BP Location: Left arm, Patient Position: Sitting, BP Cuff Size: Adult)   Pulse 65   Temp 36.1 °C (96.9 °F) (Temporal)   Ht 1.746 m (5' 8.75\")   Wt 81.2 kg (179 lb)   BMI 26.63 kg/m²     Physical Exam  Vitals reviewed.   Constitutional:       Appearance: Normal appearance. He is normal weight. He is looking better He is not toxic-appearing.   HENT:      Head: Normocephalic.   Eyes:      Comments: PALLOR   Cardiovascular:      Rate and Rhythm: Normal rate and regular rhythm.   Pulmonary:      Effort: Pulmonary effort is normal.      Breath sounds: Rales present.   Abdominal:      General: There is distension.      Tenderness: There is no abdominal tenderness.   Musculoskeletal:      Cervical back: Normal range of motion.   Skin:     General: Skin is warm and dry.   Neurological:      General: No focal deficit present.   Psychiatric:         Mood and Affect: Mood is irritable and upset    Assessment/Plan   Problem List Items Addressed This Visit             ICD-10-CM    Atherosclerotic heart disease of native coronary artery without angina pectoris - Primary I25.10    Decompensation of cirrhosis of liver (CMS/HCC) K72.90, K74.60    Controlled type 2 diabetes mellitus without complication, without long-term current use of insulin (CMS/[facility]) E11.9    Essential hypertension I10    Portal hypertension (CMS/[facility]) K76.6    Refractory ascites R18.8    Type 2 diabetes mellitus with stage 3a chronic kidney disease, with long-term current use of insulin (CMS/[facility]) E11.22, N18.31, Z79.4   Patient's struggle continues, he was not getting transplant done because of MELD score and then uncontrolled hyperglycemia and then hyponatremia, he continued to have a ascites " paracentesis, he was looking for help so he went to [facility], over there he was admitted, patient has a SPECT perfusion scan done which was negative, patient has a GI bleeding but upper endoscopy showed known bleeding varices, there was no stigmata of active bleeding, there was a duodenitis.  Colonoscopy did not show any bleeding, he continued to remain anemic, he continued to remain hyponatremic with improved sodium level, he is not using any diuretics, he is on a scheduled paracentesis and almost 10 L of the fluid has been removed, not sure how well he will do because of decompensated liver disease.  1 or other problems will keep him away from transplant and actually when he was called for transplant last time when he was with me in my office that liver was not suitable to him, blood sugars are fluctuating, he remains only on Tresiba, he is not on any sort of metformin, blood pressure fluctuate, he remains on midodrine he has a severe portal hypertension, he has refractory ascites, Myoview scan was not showing any ongoing ischemia.  He does not have any hepatic encephalopathy, lactulose was given, he is aware about titrating lactulose dosage, ciprofloxacin has been given as a prophylactic therapy for prevention of SBP, we went through his medication profile and several medications were removed, it keeps on changing, recent hepatology follow-up was reviewed, I told him to stay with a 1 hepatology here but his struggle and frustration is profound, we hope that sooner or later he will get hepatic transplant otherwise his quality of life remains impaired with decompensated liver disease and he could be susceptible for hepatic encephalopathy and spontaneous bacterial peritonitis because of frequent ascites paracentesis I told him and he is aware of it.  Follow-up in 1 month.

## 2023-10-14 ENCOUNTER — HOSPITAL ENCOUNTER (EMERGENCY)
Facility: HOSPITAL | Age: 53
Discharge: HOME | End: 2023-10-14
Attending: EMERGENCY MEDICINE
Payer: COMMERCIAL

## 2023-10-14 VITALS
WEIGHT: 179 LBS | BODY MASS INDEX: 27.13 KG/M2 | RESPIRATION RATE: 16 BRPM | DIASTOLIC BLOOD PRESSURE: 44 MMHG | SYSTOLIC BLOOD PRESSURE: 90 MMHG | TEMPERATURE: 97.5 F | OXYGEN SATURATION: 99 % | HEIGHT: 68 IN | HEART RATE: 67 BPM

## 2023-10-14 DIAGNOSIS — E87.1 CHRONIC HYPONATREMIA: ICD-10-CM

## 2023-10-14 DIAGNOSIS — D64.9 MILD CHRONIC ANEMIA: ICD-10-CM

## 2023-10-14 DIAGNOSIS — R11.2 NAUSEA AND VOMITING, UNSPECIFIED VOMITING TYPE: Primary | ICD-10-CM

## 2023-10-14 LAB
ALBUMIN SERPL BCP-MCNC: 3.4 G/DL (ref 3.4–5)
ALP SERPL-CCNC: 302 U/L (ref 33–120)
ALT SERPL W P-5'-P-CCNC: 53 U/L (ref 10–52)
ANION GAP SERPL CALC-SCNC: 12 MMOL/L (ref 10–20)
AST SERPL W P-5'-P-CCNC: 66 U/L (ref 9–39)
BASOPHILS # BLD AUTO: 0.02 X10*3/UL (ref 0–0.1)
BASOPHILS NFR BLD AUTO: 0.4 %
BILIRUB SERPL-MCNC: 1.3 MG/DL (ref 0–1.2)
BUN SERPL-MCNC: 76 MG/DL (ref 6–23)
CALCIUM SERPL-MCNC: 8.4 MG/DL (ref 8.6–10.3)
CARDIAC TROPONIN I PNL SERPL HS: 9 NG/L (ref 0–20)
CHLORIDE SERPL-SCNC: 101 MMOL/L (ref 98–107)
CO2 SERPL-SCNC: 17 MMOL/L (ref 21–32)
CREAT SERPL-MCNC: 2.49 MG/DL (ref 0.5–1.3)
EOSINOPHIL # BLD AUTO: 0.12 X10*3/UL (ref 0–0.7)
EOSINOPHIL NFR BLD AUTO: 2.4 %
ERYTHROCYTE [DISTWIDTH] IN BLOOD BY AUTOMATED COUNT: 15 % (ref 11.5–14.5)
GFR SERPL CREATININE-BSD FRML MDRD: 30 ML/MIN/1.73M*2
GLUCOSE SERPL-MCNC: 137 MG/DL (ref 74–99)
HCT VFR BLD AUTO: 22.4 % (ref 41–52)
HGB BLD-MCNC: 7.1 G/DL (ref 13.5–17.5)
IMM GRANULOCYTES # BLD AUTO: 0.03 X10*3/UL (ref 0–0.7)
IMM GRANULOCYTES NFR BLD AUTO: 0.6 % (ref 0–0.9)
LYMPHOCYTES # BLD AUTO: 1.12 X10*3/UL (ref 1.2–4.8)
LYMPHOCYTES NFR BLD AUTO: 22.1 %
MCH RBC QN AUTO: 29.8 PG (ref 26–34)
MCHC RBC AUTO-ENTMCNC: 31.7 G/DL (ref 32–36)
MCV RBC AUTO: 94 FL (ref 80–100)
MONOCYTES # BLD AUTO: 0.54 X10*3/UL (ref 0.1–1)
MONOCYTES NFR BLD AUTO: 10.7 %
NEUTROPHILS # BLD AUTO: 3.24 X10*3/UL (ref 1.2–7.7)
NEUTROPHILS NFR BLD AUTO: 63.8 %
NRBC BLD-RTO: 0 /100 WBCS (ref 0–0)
PLATELET # BLD AUTO: 104 X10*3/UL (ref 150–450)
PMV BLD AUTO: 11.6 FL (ref 7.5–11.5)
POTASSIUM SERPL-SCNC: 5.3 MMOL/L (ref 3.5–5.3)
PROT SERPL-MCNC: 5.4 G/DL (ref 6.4–8.2)
RBC # BLD AUTO: 2.38 X10*6/UL (ref 4.5–5.9)
SODIUM SERPL-SCNC: 125 MMOL/L (ref 136–145)
WBC # BLD AUTO: 5.1 X10*3/UL (ref 4.4–11.3)

## 2023-10-14 PROCEDURE — 80053 COMPREHEN METABOLIC PANEL: CPT | Performed by: PHYSICIAN ASSISTANT

## 2023-10-14 PROCEDURE — 85025 COMPLETE CBC W/AUTO DIFF WBC: CPT | Performed by: PHYSICIAN ASSISTANT

## 2023-10-14 PROCEDURE — 36415 COLL VENOUS BLD VENIPUNCTURE: CPT | Performed by: PHYSICIAN ASSISTANT

## 2023-10-14 PROCEDURE — 84484 ASSAY OF TROPONIN QUANT: CPT | Performed by: PHYSICIAN ASSISTANT

## 2023-10-14 PROCEDURE — 96374 THER/PROPH/DIAG INJ IV PUSH: CPT

## 2023-10-14 PROCEDURE — 2500000004 HC RX 250 GENERAL PHARMACY W/ HCPCS (ALT 636 FOR OP/ED): Performed by: PHYSICIAN ASSISTANT

## 2023-10-14 PROCEDURE — 96361 HYDRATE IV INFUSION ADD-ON: CPT

## 2023-10-14 PROCEDURE — 99284 EMERGENCY DEPT VISIT MOD MDM: CPT | Performed by: EMERGENCY MEDICINE

## 2023-10-14 RX ORDER — ONDANSETRON HYDROCHLORIDE 2 MG/ML
4 INJECTION, SOLUTION INTRAVENOUS ONCE
Status: COMPLETED | OUTPATIENT
Start: 2023-10-14 | End: 2023-10-14

## 2023-10-14 RX ADMIN — ONDANSETRON 4 MG: 2 INJECTION INTRAMUSCULAR; INTRAVENOUS at 09:15

## 2023-10-14 RX ADMIN — SODIUM CHLORIDE 1000 ML: 9 INJECTION, SOLUTION INTRAVENOUS at 09:15

## 2023-10-14 ASSESSMENT — PAIN - FUNCTIONAL ASSESSMENT
PAIN_FUNCTIONAL_ASSESSMENT: 0-10
PAIN_FUNCTIONAL_ASSESSMENT: 0-10

## 2023-10-14 ASSESSMENT — COLUMBIA-SUICIDE SEVERITY RATING SCALE - C-SSRS
2. HAVE YOU ACTUALLY HAD ANY THOUGHTS OF KILLING YOURSELF?: NO
1. IN THE PAST MONTH, HAVE YOU WISHED YOU WERE DEAD OR WISHED YOU COULD GO TO SLEEP AND NOT WAKE UP?: NO
6. HAVE YOU EVER DONE ANYTHING, STARTED TO DO ANYTHING, OR PREPARED TO DO ANYTHING TO END YOUR LIFE?: NO

## 2023-10-14 ASSESSMENT — LIFESTYLE VARIABLES
REASON UNABLE TO ASSESS: NO
HAVE PEOPLE ANNOYED YOU BY CRITICIZING YOUR DRINKING: NO
EVER HAD A DRINK FIRST THING IN THE MORNING TO STEADY YOUR NERVES TO GET RID OF A HANGOVER: NO
EVER FELT BAD OR GUILTY ABOUT YOUR DRINKING: NO
HAVE YOU EVER FELT YOU SHOULD CUT DOWN ON YOUR DRINKING: NO

## 2023-10-14 ASSESSMENT — PAIN SCALES - GENERAL
PAINLEVEL_OUTOF10: 0 - NO PAIN
PAINLEVEL_OUTOF10: 0 - NO PAIN

## 2023-10-14 NOTE — ED PROVIDER NOTES
HPI   Chief Complaint   Patient presents with    Nausea    Vomiting     Pt on liver transplant list and has been vomiting today        53-year-old male states that he was contacted by his provider stating that his potassium was high.  Patient is currently awaiting a liver transplant at [facility].  Patient states he has his blood drawn weekly.  Patient complaining of nausea denies chest pain or shortness of breath.                          [place] Coma Scale Score: 15                  Patient History   Past Medical History:   Diagnosis Date    Anemia     GERD (gastroesophageal reflux disease)      Past Surgical History:   Procedure Laterality Date    CORONARY STENT PLACEMENT      MENISCECTOMY      OTHER SURGICAL HISTORY  12/29/2020    Arthroscopy    OTHER SURGICAL HISTORY  12/29/2020    Complete colonoscopy    OTHER SURGICAL HISTORY  01/18/2022    Inguinal hernia repair    OTHER SURGICAL HISTORY  11/16/2021    Liver biopsy    OTHER SURGICAL HISTORY  12/07/2021    Meniscus repair    OTHER SURGICAL HISTORY  12/16/2021    Cardiac catheterization with stent placement    US GUIDED ABDOMINAL PARACENTESIS  05/10/2023    US GUIDED ABDOMINAL PARACENTESIS 5/10/2023 [facility] US    US GUIDED ABDOMINAL PARACENTESIS  05/26/2023    US GUIDED ABDOMINAL PARACENTESIS 5/26/2023 [facility] US    US GUIDED ABDOMINAL PARACENTESIS  07/13/2023    US GUIDED ABDOMINAL PARACENTESIS 7/13/2023 [facility] US    US GUIDED ABDOMINAL PARACENTESIS  07/21/2023    US GUIDED ABDOMINAL PARACENTESIS 7/21/2023 [facility]    US GUIDED ABDOMINAL PARACENTESIS  07/31/2023    US GUIDED ABDOMINAL PARACENTESIS 7/31/2023 [facility]    US GUIDED ABDOMINAL PARACENTESIS  08/08/2023    US GUIDED ABDOMINAL PARACENTESIS 8/8/2023 [facility]    US GUIDED ABDOMINAL PARACENTESIS  08/14/2023    US GUIDED ABDOMINAL PARACENTESIS 8/14/2023 [facility]    US GUIDED ABDOMINAL PARACENTESIS  08/22/2023    US GUIDED ABDOMINAL PARACENTESIS 8/22/2023 [facility]    US GUIDED ABDOMINAL PARACENTESIS   2023    US GUIDED ABDOMINAL PARACENTESIS 2023 [facility] US    US GUIDED ABDOMINAL PARACENTESIS  2023    US GUIDED ABDOMINAL PARACENTESIS 2023 [facility] US    US GUIDED ABDOMINAL PARACENTESIS  09/15/2023    US GUIDED ABDOMINAL PARACENTESIS 9/15/2023 [facility] US    US GUIDED ABDOMINAL PARACENTESIS  2023    US GUIDED ABDOMINAL PARACENTESIS 2023 [facility] US    US GUIDED ABDOMINAL PARACENTESIS  2023    US GUIDED ABDOMINAL PARACENTESIS 2023 [facility] US    US GUIDED ABDOMINAL PARACENTESIS  10/10/2023    US GUIDED ABDOMINAL PARACENTESIS 10/10/2023 Edwin Rivera MD [facility]     No family history on file.  Social History     Tobacco Use    Smoking status: Former     Types: Cigarettes     Quit date: 2010     Years since quittin.7    Smokeless tobacco: Never   Vaping Use    Vaping Use: Never used   Substance Use Topics    Alcohol use: Not Currently    Drug use: Never       Physical Exam   ED Triage Vitals [10/14/23 0821]   Temp Heart Rate Resp BP   36.4 °C (97.5 °F) 74 16 112/56      SpO2 Temp Source Heart Rate Source Patient Position   100 % Temporal Monitor Sitting      BP Location FiO2 (%)     Right arm --       Physical Exam  Vitals and nursing note reviewed.   Constitutional:       General: He is not in acute distress.     Appearance: He is well-developed.   HENT:      Head: Normocephalic and atraumatic.   Eyes:      Conjunctiva/sclera: Conjunctivae normal.   Cardiovascular:      Rate and Rhythm: Normal rate and regular rhythm.      Heart sounds: No murmur heard.  Pulmonary:      Effort: Pulmonary effort is normal. No respiratory distress.      Breath sounds: Normal breath sounds.   Abdominal:      General: There is no distension.      Palpations: Abdomen is soft.      Tenderness: There is no abdominal tenderness.      Comments: Very mild ascites   Musculoskeletal:         General: No swelling.      Cervical back: Neck supple.   Skin:     General: Skin is warm and dry.      Capillary Refill:  Capillary refill takes less than 2 seconds.   Neurological:      Mental Status: He is alert.   Psychiatric:         Mood and Affect: Mood normal.         ED Course & MDM   ED Course as of 10/14/23 1117   Sat Oct 14, 2023   0954 SODIUM(!): 125 [JT]   0955 Creatinine(!): 2.49 [JT]      ED Course User Index  [JT] Devin Jean PA-C         Diagnoses as of 10/14/23 1117   Nausea and vomiting, unspecified vomiting type   Chronic hyponatremia   Mild chronic anemia       Medical Decision Making  HPI:53-year-old male states that he was contacted by his provider stating that his potassium was high.  Patient is currently awaiting a liver transplant at [facility].  Patient states he has his blood drawn weekly.  Patient complaining of nausea denies chest pain or shortness of breath.    Differential diagnosis: Hyperkalemia, liver failure, renal failure, electrolyte abnormality    Pertinent physical exam: Lungs are clear to auscultation bilaterally no accessory muscle use heart demonstrates a regular rate and rhythm without murmurs clicks or gallops.  Abdomen soft nontender very mild ascites    Laboratory results:Labs Reviewed  CBC WITH AUTO DIFFERENTIAL - Abnormal     WBC                           5.1                    nRBC                          0.0                    RBC                           2.38 (*)               Hemoglobin                    7.1 (*)                Hematocrit                    22.4 (*)               MCV                           94                     MCH                           29.8                   MCHC                          31.7 (*)               RDW                           15.0 (*)               Platelets                     104 (*)                MPV                           11.6 (*)               Neutrophils %                 63.8                   Immature Granulocytes %, Automated   0.6                    Lymphocytes %                 22.1                    Monocytes %                   10.7                   Eosinophils %                 2.4                    Basophils %                   0.4                    Neutrophils Absolute          3.24                   Immature Granulocytes Absolute, Au*   0.03                   Lymphocytes Absolute          1.12 (*)               Monocytes Absolute            0.54                   Eosinophils Absolute          0.12                   Basophils Absolute            0.02                COMPREHENSIVE METABOLIC PANEL - Abnormal     Glucose                       137 (*)                Sodium                        125 (*)                Potassium                     5.3                    Chloride                      101                    Bicarbonate                   17 (*)                 Anion Gap                     12                     Urea Nitrogen                 76 (*)                 Creatinine                    2.49 (*)               eGFR                          30 (*)                 Calcium                       8.4 (*)                Albumin                       3.4                    Alkaline Phosphatase          302 (*)                Total Protein                 5.4 (*)                AST                           66 (*)                 Bilirubin, Total              1.3 (*)                ALT                           53 (*)              TROPONIN I, HIGH SENSITIVITY - Normal      Radiologic results:    EKG results: Twelve-lead EKG per my interpretation demonstrates normal sinus rhythm without ischemic or ectopic activity ventricular rate 64    ED course and treatment: In the emergency department patient was given fluids and IV Zofran which improved his nausea.  Carefully reviewed current and past lab values demonstrating chronic anemia, chronic hyponatremia, chronic elevated liver enzymes, chronic renal insufficiency.  I carefully reviewed all labs with patient and states that these changes are chronic.   Encourage patient to follow-up with his primary care team as needed.  Patient in agreement with findings diagnosis and plan    Diagnosis: Chronic renal insufficiency, chronic hyponatremia, chronic anemia    Disposition: Will be discharged home    Social determinants of health: None    *This documentation is completed using the Dragon Dictation system. There may be spelling and/or grammatical errors that were not corrected prior to final submission. I apologize for any inconvenience.*              Procedure  Procedures     Devin Jean PA-C  10/14/23 1106       Devin Jean PA-C  10/14/23 1117

## 2023-10-17 ENCOUNTER — LAB (OUTPATIENT)
Dept: LAB | Facility: LAB | Age: 53
End: 2023-10-17
Payer: COMMERCIAL

## 2023-10-17 ENCOUNTER — APPOINTMENT (OUTPATIENT)
Dept: RADIOLOGY | Facility: HOSPITAL | Age: 53
End: 2023-10-17

## 2023-10-17 ENCOUNTER — HOSPITAL ENCOUNTER (INPATIENT)
Age: 53
End: 2023-10-17
Attending: INTERNAL MEDICINE | Admitting: INTERNAL MEDICINE

## 2023-10-17 ENCOUNTER — TELEPHONE (OUTPATIENT)
Dept: TRANSPLANT | Facility: HOSPITAL | Age: 53
End: 2023-10-17

## 2023-10-17 DIAGNOSIS — Z76.82 PRE-LIVER TRANSPLANT, LISTED: ICD-10-CM

## 2023-10-17 DIAGNOSIS — K75.81 NASH (NONALCOHOLIC STEATOHEPATITIS): ICD-10-CM

## 2023-10-17 LAB
ALBUMIN SERPL BCP-MCNC: 3.2 G/DL (ref 3.4–5)
ALP SERPL-CCNC: 313 U/L (ref 33–120)
ALT SERPL W P-5'-P-CCNC: 57 U/L (ref 10–52)
ANION GAP SERPL CALC-SCNC: 13 MMOL/L (ref 10–20)
AST SERPL W P-5'-P-CCNC: 68 U/L (ref 9–39)
BASOPHILS # BLD AUTO: 0.05 X10*3/UL (ref 0–0.1)
BASOPHILS NFR BLD AUTO: 0.7 %
BILIRUB SERPL-MCNC: 1.4 MG/DL (ref 0–1.2)
BUN SERPL-MCNC: 94 MG/DL (ref 6–23)
CALCIUM SERPL-MCNC: 8.7 MG/DL (ref 8.6–10.3)
CHLORIDE SERPL-SCNC: 100 MMOL/L (ref 98–107)
CO2 SERPL-SCNC: 15 MMOL/L (ref 21–32)
CREAT SERPL-MCNC: 2.91 MG/DL (ref 0.5–1.3)
EOSINOPHIL # BLD AUTO: 0.14 X10*3/UL (ref 0–0.7)
EOSINOPHIL NFR BLD AUTO: 1.9 %
ERYTHROCYTE [DISTWIDTH] IN BLOOD BY AUTOMATED COUNT: 15.8 % (ref 11.5–14.5)
GFR SERPL CREATININE-BSD FRML MDRD: 25 ML/MIN/1.73M*2
GLUCOSE SERPL-MCNC: 202 MG/DL (ref 74–99)
HCT VFR BLD AUTO: 21.9 % (ref 41–52)
HGB BLD-MCNC: 6.8 G/DL (ref 13.5–17.5)
IMM GRANULOCYTES # BLD AUTO: 0.06 X10*3/UL (ref 0–0.7)
IMM GRANULOCYTES NFR BLD AUTO: 0.8 % (ref 0–0.9)
INR PPP: 1.8 (ref 0.9–1.1)
LYMPHOCYTES # BLD AUTO: 1.14 X10*3/UL (ref 1.2–4.8)
LYMPHOCYTES NFR BLD AUTO: 15.4 %
MCH RBC QN AUTO: 30.2 PG (ref 26–34)
MCHC RBC AUTO-ENTMCNC: 31.1 G/DL (ref 32–36)
MCV RBC AUTO: 97 FL (ref 80–100)
MONOCYTES # BLD AUTO: 0.75 X10*3/UL (ref 0.1–1)
MONOCYTES NFR BLD AUTO: 10.1 %
NEUTROPHILS # BLD AUTO: 5.27 X10*3/UL (ref 1.2–7.7)
NEUTROPHILS NFR BLD AUTO: 71.1 %
NRBC BLD-RTO: 0 /100 WBCS (ref 0–0)
PLATELET # BLD AUTO: 135 X10*3/UL (ref 150–450)
PMV BLD AUTO: 11.7 FL (ref 7.5–11.5)
POTASSIUM SERPL-SCNC: 5.9 MMOL/L (ref 3.5–5.3)
PROT SERPL-MCNC: 5.1 G/DL (ref 6.4–8.2)
PROTHROMBIN TIME: 20 SECONDS (ref 9.8–12.8)
RBC # BLD AUTO: 2.25 X10*6/UL (ref 4.5–5.9)
SODIUM SERPL-SCNC: 122 MMOL/L (ref 136–145)
WBC # BLD AUTO: 7.4 X10*3/UL (ref 4.4–11.3)

## 2023-10-17 PROCEDURE — 36415 COLL VENOUS BLD VENIPUNCTURE: CPT

## 2023-10-17 PROCEDURE — 85025 COMPLETE CBC W/AUTO DIFF WBC: CPT

## 2023-10-17 PROCEDURE — 80053 COMPREHEN METABOLIC PANEL: CPT

## 2023-10-17 PROCEDURE — 85610 PROTHROMBIN TIME: CPT

## 2023-10-23 ENCOUNTER — DOCUMENTATION (OUTPATIENT)
Dept: TRANSPLANT | Facility: HOSPITAL | Age: 53
End: 2023-10-23
Payer: COMMERCIAL

## 2023-10-24 ENCOUNTER — APPOINTMENT (OUTPATIENT)
Dept: RADIOLOGY | Facility: HOSPITAL | Age: 53
End: 2023-10-24

## 2023-10-24 ENCOUNTER — TELEPHONE (OUTPATIENT)
Dept: TRANSPLANT | Facility: HOSPITAL | Age: 53
End: 2023-10-24
Payer: COMMERCIAL

## 2023-10-24 NOTE — TELEPHONE ENCOUNTER
Patient called to notify TI that he had a been transplanted by [facility].  Patient asked that any orders he may of had for a paracentesis be removed and any appointments be canceled.

## 2023-11-02 ENCOUNTER — APPOINTMENT (OUTPATIENT)
Dept: TRANSPLANT | Facility: HOSPITAL | Age: 53
End: 2023-11-02
Payer: COMMERCIAL

## 2023-11-02 ENCOUNTER — APPOINTMENT (OUTPATIENT)
Dept: TRANSPLANT | Facility: HOSPITAL | Age: 53
End: 2023-11-02

## 2023-11-06 ENCOUNTER — HOSPITAL ENCOUNTER (OUTPATIENT)
Dept: CARDIOLOGY | Facility: HOSPITAL | Age: 53
Discharge: HOME | End: 2023-11-06
Payer: COMMERCIAL

## 2023-11-06 PROCEDURE — 93005 ELECTROCARDIOGRAM TRACING: CPT

## 2023-11-07 ENCOUNTER — APPOINTMENT (OUTPATIENT)
Dept: RADIOLOGY | Facility: HOSPITAL | Age: 53
End: 2023-11-07
Payer: COMMERCIAL

## 2023-11-09 ENCOUNTER — PATIENT OUTREACH (OUTPATIENT)
Dept: PRIMARY CARE | Facility: CLINIC | Age: 53
End: 2023-11-09
Payer: COMMERCIAL

## 2023-11-09 DIAGNOSIS — I10 HYPERTENSION, UNSPECIFIED TYPE: ICD-10-CM

## 2023-11-09 DIAGNOSIS — Z94.4 S/P LIVER TRANSPLANT (MULTI): ICD-10-CM

## 2023-11-09 PROCEDURE — 99490 CHRNC CARE MGMT STAFF 1ST 20: CPT | Performed by: INTERNAL MEDICINE

## 2023-11-09 NOTE — PROGRESS NOTES
Spoke with pt who is back home from ER. He is recovering from liver transplant on 10/20. Continues to have a lot of abdominal pain and loose stools. Although the loose stools has gotten better per pt. Is being treated for C-diff. No adverse affects. Pt states he has been up walking without diff and has a good appetite. Advised to call this nurse with any questions or concerns.

## 2023-11-22 ENCOUNTER — PATIENT OUTREACH (OUTPATIENT)
Dept: PRIMARY CARE | Facility: CLINIC | Age: 53
End: 2023-11-22
Payer: COMMERCIAL

## 2023-11-22 ENCOUNTER — DOCUMENTATION (OUTPATIENT)
Dept: PRIMARY CARE | Facility: CLINIC | Age: 53
End: 2023-11-22
Payer: COMMERCIAL

## 2023-11-22 NOTE — PROGRESS NOTES
Discharge facility: [facility]  Discharge diagnosis: hyperkalemia, C-diff, LETITIA  Admission date: 11/11/23  Discharge date: 11/21/23    PCP Appointment Date: 11/27/23 @ 2:15pm  Specialist Appointment Date: D  Hospital Encounter and Summary: Linked   See Discharge assessment below for further details     Engagement  Call Start Time: 1025 (11/22/2023 10:51 AM)    Medications  Medications reviewed with patient/caregiver?: No (11/22/2023 10:51 AM)  Is the patient having any side effects they believe may be caused by any medication additions or changes?: No (11/22/2023 10:51 AM)  Does the patient have all medications ordered at discharge?: Yes (11/22/2023 10:51 AM)  Care Management Interventions: No intervention needed (11/22/2023 10:51 AM)  Is the patient taking all medications as directed (includes completed medication regime)?: Yes (11/22/2023 10:51 AM)    Appointments  Does the patient have a primary care provider?: Yes (11/22/2023 10:51 AM)  Care Management Interventions: Verified appointment date/time/provider (11/22/2023 10:51 AM)  Has the patient kept scheduled appointments due by today?: No (11/22/2023 10:51 AM)  Care Management Interventions: Educated on importance of keeping appointment (11/22/2023 10:51 AM)    Self Management  What is the home health agency?: NA (11/22/2023 10:51 AM)  Has home health visited the patient within 72 hours of discharge?: Not applicable (11/22/2023 10:51 AM)  What Durable Medical Equipment (DME) was ordered?: NA (11/22/2023 10:51 AM)    Patient Teaching  Does the patient have access to their discharge instructions?: Yes (11/22/2023 10:51 AM)  Care Management Interventions: Reviewed instructions with patient (11/22/2023 10:51 AM)  What is the patient's perception of their health status since discharge?: Improving (11/22/2023 10:51 AM)  Is the patient/caregiver able to teach back the hierarchy of who to call/visit for symptoms/problems? PCP, Specialist, Home Health nurse,  Urgent Care, ED, 911: Yes (11/22/2023 10:51 AM)    Wrap Up  Is the patient/caregiver familiar with Advance Care Planning?: Yes (11/22/2023 10:51 AM)  Would the patient like more information on Advance Care Planning?: No (11/22/2023 10:51 AM)  Wrap Up Additional Comments: Pt stated he was started on Coreg 25mg 2x/day with meals, Valcyte 450mg 2x/day with meals and Prograf 1mg - 5 caps 2x/day. Has been taking meds as ordered. Voiced no questions. Has been up and about without complications. (11/22/2023 10:51 AM)  Call End Time: 1032 (11/22/2023 10:51 AM)

## 2023-11-27 ENCOUNTER — OFFICE VISIT (OUTPATIENT)
Dept: PRIMARY CARE | Facility: CLINIC | Age: 53
End: 2023-11-27
Payer: COMMERCIAL

## 2023-11-27 VITALS
BODY MASS INDEX: 23.34 KG/M2 | TEMPERATURE: 96.9 F | HEART RATE: 75 BPM | DIASTOLIC BLOOD PRESSURE: 62 MMHG | SYSTOLIC BLOOD PRESSURE: 100 MMHG | HEIGHT: 68 IN | WEIGHT: 154 LBS

## 2023-11-27 DIAGNOSIS — I10 ESSENTIAL HYPERTENSION: ICD-10-CM

## 2023-11-27 DIAGNOSIS — I25.10 ATHEROSCLEROSIS OF NATIVE CORONARY ARTERY OF NATIVE HEART WITHOUT ANGINA PECTORIS: Primary | ICD-10-CM

## 2023-11-27 DIAGNOSIS — Z94.4 LIVER TRANSPLANTED (MULTI): ICD-10-CM

## 2023-11-27 DIAGNOSIS — E11.9 CONTROLLED TYPE 2 DIABETES MELLITUS WITHOUT COMPLICATION, WITHOUT LONG-TERM CURRENT USE OF INSULIN (MULTI): ICD-10-CM

## 2023-11-27 PROBLEM — J44.89 CHRONIC OBSTRUCTIVE AIRWAY DISEASE WITH ASTHMA (MULTI): Status: RESOLVED | Noted: 2023-09-30 | Resolved: 2023-11-27

## 2023-11-27 PROBLEM — K76.6 PORTAL HYPERTENSION (MULTI): Status: RESOLVED | Noted: 2023-04-24 | Resolved: 2023-11-27

## 2023-11-27 PROCEDURE — 3066F NEPHROPATHY DOC TX: CPT | Performed by: INTERNAL MEDICINE

## 2023-11-27 PROCEDURE — 3078F DIAST BP <80 MM HG: CPT | Performed by: INTERNAL MEDICINE

## 2023-11-27 PROCEDURE — 3074F SYST BP LT 130 MM HG: CPT | Performed by: INTERNAL MEDICINE

## 2023-11-27 PROCEDURE — 3044F HG A1C LEVEL LT 7.0%: CPT | Performed by: INTERNAL MEDICINE

## 2023-11-27 PROCEDURE — 3008F BODY MASS INDEX DOCD: CPT | Performed by: INTERNAL MEDICINE

## 2023-11-27 PROCEDURE — 1036F TOBACCO NON-USER: CPT | Performed by: INTERNAL MEDICINE

## 2023-11-27 PROCEDURE — 99214 OFFICE O/P EST MOD 30 MIN: CPT | Performed by: INTERNAL MEDICINE

## 2023-11-27 RX ORDER — NAPROXEN SODIUM 220 MG/1
81 TABLET, FILM COATED ORAL
COMMUNITY
Start: 2023-10-31

## 2023-11-27 RX ORDER — MYCOPHENOLATE MOFETIL 250 MG/1
CAPSULE ORAL
COMMUNITY
Start: 2023-10-26 | End: 2023-12-27 | Stop reason: ALTCHOICE

## 2023-11-27 RX ORDER — SODIUM CHLORIDE 1 G/1
2 TABLET ORAL 3 TIMES DAILY
COMMUNITY
Start: 2023-10-26 | End: 2024-02-28 | Stop reason: ALTCHOICE

## 2023-11-27 RX ORDER — VALGANCICLOVIR 450 MG/1
1 TABLET, FILM COATED ORAL
COMMUNITY
Start: 2023-11-21 | End: 2024-02-28 | Stop reason: ALTCHOICE

## 2023-11-27 RX ORDER — TACROLIMUS 1 MG/1
5 CAPSULE ORAL 2 TIMES DAILY
COMMUNITY
Start: 2023-11-21

## 2023-11-27 RX ORDER — INSULIN HUMAN 100 [IU]/ML
12 INJECTION, SUSPENSION SUBCUTANEOUS
COMMUNITY
Start: 2023-10-30

## 2023-11-27 RX ORDER — SULFAMETHOXAZOLE AND TRIMETHOPRIM 800; 160 MG/1; MG/1
1 TABLET ORAL
COMMUNITY
Start: 2023-10-27 | End: 2024-02-28 | Stop reason: ALTCHOICE

## 2023-11-27 RX ORDER — CARVEDILOL 25 MG/1
25 TABLET ORAL
Status: SHIPPED
Start: 2023-11-27 | End: 2023-12-27 | Stop reason: SDUPTHER

## 2023-11-27 ASSESSMENT — ENCOUNTER SYMPTOMS
DIARRHEA: 0
ARTHRALGIAS: 0
ABDOMINAL DISTENTION: 0
DIZZINESS: 0
ADENOPATHY: 0
UNEXPECTED WEIGHT CHANGE: 1
COUGH: 0
NEUROLOGICAL NEGATIVE: 1
CHEST TIGHTNESS: 0
SHORTNESS OF BREATH: 0
FATIGUE: 1
CONSTIPATION: 0
PALPITATIONS: 0
CHOKING: 0

## 2023-11-27 NOTE — PROGRESS NOTES
Subjective   Patient ID: Walker Cuenca IV is a 53 y.o. male who presents for Follow-up (Fu after liver transplant).    This patient has a liver transplant, it was a very complicated situation this patient has gone through in the last 6 months.  Periodically and frequently I have contacted transplant center here at [facility] and also I have tried to contact hepatology department which I was not very much successful.  He was having ascites, he was having portal hypertension, he was having terminal cirrhosis of liver, he has a MELD score was around the 27-32.  Patient was tired of it and he went to [facility] and he got a transplant done and his life is changed, his ascites is gone, he feels much better, he lost all that weight, his platelet counts are normalized, his sodium level is improved, his blood sugars are better, his sense of wellbeing is tremendously improved, all the hospitalization data and information from [facility] regarding transplant were reviewed, patient has gastric ulcer just behind the left lobe of the liver, it was a very difficult endoscopic finding and that was the culprit for recurrent GI bleeding and anemia, it was a very complex surgery surgery lasted for 20 hours but it was excellent outcome, patient is feeling much better all the report and information regarding to liver transplant surgery and ER visit since hospitalization surgical procedure and the laboratories were reviewed.  List of medications were reviewed, he will be on mycophenolate, tacrolimus, all the care will be provided at [facility] transplant center.         Review of Systems   Constitutional:  Positive for fatigue and unexpected weight change.   Respiratory:  Negative for cough, choking, chest tightness and shortness of breath.    Cardiovascular:  Negative for chest pain and palpitations.   Gastrointestinal:  Negative for abdominal distention, constipation and diarrhea.   Musculoskeletal:  Negative  "for arthralgias.   Neurological: Negative.  Negative for dizziness.   Hematological:  Negative for adenopathy.       Objective   /62 (BP Location: Left arm, Patient Position: Sitting, BP Cuff Size: Adult)   Pulse 75   Temp 36.1 °C (96.9 °F) (Temporal)   Ht 1.727 m (5' 8\")   Wt 69.9 kg (154 lb)   BMI 23.42 kg/m²     Physical Exam  Vitals reviewed.   Constitutional:       Appearance: Normal appearance. He is normal weight. He is not ill-appearing or diaphoretic.   HENT:      Head: Normocephalic and atraumatic.   Eyes:      Conjunctiva/sclera: Conjunctivae normal.   Cardiovascular:      Rate and Rhythm: Normal rate and regular rhythm.      Pulses: Normal pulses.   Pulmonary:      Effort: Pulmonary effort is normal.      Breath sounds: Normal breath sounds.   Abdominal:      Palpations: Abdomen is soft.   Musculoskeletal:         General: Normal range of motion.      Cervical back: Neck supple.   Skin:     General: Skin is warm and dry.   Neurological:      General: No focal deficit present.   Psychiatric:         Mood and Affect: Mood normal.       Assessment/Plan   Problem List Items Addressed This Visit             ICD-10-CM    Atherosclerotic heart disease of native coronary artery without angina pectoris - Primary I25.10    Relevant Medications    carvedilol (Coreg) 25 mg tablet    Controlled type 2 diabetes mellitus without complication, without long-term current use of insulin (CMS/[facility]) E11.9    Essential hypertension I10    Liver transplanted (CMS/[facility]) Z94.4   Very glad to see that this patient's life is stable, he has a liver transplant, CellCept and tacrolimus immunosuppressive therapy, sodium chloride dose can be tapered off but it will be done by [facility], patient will remain on Valcyte, Bactrim, Diflucan for bacterial, viral prophylaxis.  He will continue to follow-up with me on a monthly basis still I am satisfied, all the wounds are healed well, BP readings are excellent, there was a RCA lesion " which was intervened, blood sugars are reviewed, electrolytes are reviewed, 2 days ago his laboratories were reviewed, patient will have a normal life now, although he has to be careful and he has to be followed at transplant center but terminal liver disease cirrhosis and recurrent ascites is all gone, very glad to see that patient's care has been done well at [facility], follow-up in 1 month.

## 2023-11-28 ENCOUNTER — PATIENT OUTREACH (OUTPATIENT)
Dept: PRIMARY CARE | Facility: CLINIC | Age: 53
End: 2023-11-28
Payer: COMMERCIAL

## 2023-11-28 NOTE — PROGRESS NOTES
Pt had follow up appt with PCP. Appt went well. Was very pleased with liver transplant. Wounds healed, BP's good, blood sugars and labs reviewed. Will follow up with PCP monthly.

## 2023-11-29 ENCOUNTER — APPOINTMENT (OUTPATIENT)
Dept: TRANSPLANT | Facility: HOSPITAL | Age: 53
End: 2023-11-29
Payer: COMMERCIAL

## 2023-11-30 ENCOUNTER — APPOINTMENT (OUTPATIENT)
Dept: TRANSPLANT | Facility: HOSPITAL | Age: 53
End: 2023-11-30
Payer: COMMERCIAL

## 2023-12-11 DIAGNOSIS — I25.10 ATHEROSCLEROSIS OF NATIVE CORONARY ARTERY OF NATIVE HEART WITHOUT ANGINA PECTORIS: ICD-10-CM

## 2023-12-11 DIAGNOSIS — Z94.4 LIVER TRANSPLANTED (MULTI): ICD-10-CM

## 2023-12-27 ENCOUNTER — OFFICE VISIT (OUTPATIENT)
Dept: PRIMARY CARE | Facility: CLINIC | Age: 53
End: 2023-12-27
Payer: COMMERCIAL

## 2023-12-27 VITALS
DIASTOLIC BLOOD PRESSURE: 78 MMHG | HEART RATE: 78 BPM | HEIGHT: 68 IN | SYSTOLIC BLOOD PRESSURE: 105 MMHG | TEMPERATURE: 96.6 F | BODY MASS INDEX: 25.46 KG/M2 | WEIGHT: 168 LBS

## 2023-12-27 DIAGNOSIS — E11.9 CONTROLLED TYPE 2 DIABETES MELLITUS WITHOUT COMPLICATION, WITHOUT LONG-TERM CURRENT USE OF INSULIN (MULTI): ICD-10-CM

## 2023-12-27 DIAGNOSIS — B25.1: Primary | ICD-10-CM

## 2023-12-27 DIAGNOSIS — I10 ESSENTIAL HYPERTENSION: ICD-10-CM

## 2023-12-27 DIAGNOSIS — Z94.4 LIVER TRANSPLANTED (MULTI): ICD-10-CM

## 2023-12-27 DIAGNOSIS — D84.9 IMMUNOSUPPRESSED STATUS (MULTI): ICD-10-CM

## 2023-12-27 PROBLEM — D63.8 ANEMIA IN OTHER CHRONIC DISEASES CLASSIFIED ELSEWHERE: Status: ACTIVE | Noted: 2023-09-30

## 2023-12-27 PROCEDURE — 3078F DIAST BP <80 MM HG: CPT | Performed by: INTERNAL MEDICINE

## 2023-12-27 PROCEDURE — 99213 OFFICE O/P EST LOW 20 MIN: CPT | Performed by: INTERNAL MEDICINE

## 2023-12-27 PROCEDURE — 3008F BODY MASS INDEX DOCD: CPT | Performed by: INTERNAL MEDICINE

## 2023-12-27 PROCEDURE — 3066F NEPHROPATHY DOC TX: CPT | Performed by: INTERNAL MEDICINE

## 2023-12-27 PROCEDURE — 3044F HG A1C LEVEL LT 7.0%: CPT | Performed by: INTERNAL MEDICINE

## 2023-12-27 PROCEDURE — 3074F SYST BP LT 130 MM HG: CPT | Performed by: INTERNAL MEDICINE

## 2023-12-27 PROCEDURE — 1036F TOBACCO NON-USER: CPT | Performed by: INTERNAL MEDICINE

## 2023-12-27 RX ORDER — CARVEDILOL 25 MG/1
25 TABLET ORAL
Qty: 90 TABLET | Refills: 3 | Status: SHIPPED | OUTPATIENT
Start: 2023-12-27

## 2023-12-27 ASSESSMENT — ENCOUNTER SYMPTOMS
NAUSEA: 0
HEMATOCHEZIA: 0
WEIGHT LOSS: 0
HEMATEMESIS: 0
FATIGUE: 1
SHORTNESS OF BREATH: 0
ARTHRALGIAS: 0
ABDOMINAL DISTENTION: 0
CHOKING: 0
CHILLS: 0
ANOREXIA: 0
MUSCULOSKELETAL NEGATIVE: 1
NEUROLOGICAL NEGATIVE: 1
FEVER: 0
BRUISES/BLEEDS EASILY: 0
JAUNDICE: 0
VOMITING: 0
ABDOMINAL PAIN: 1
MYALGIAS: 0
COUGH: 0
CARDIOVASCULAR NEGATIVE: 1
DIARRHEA: 1
DYSURIA: 0

## 2023-12-27 NOTE — PROGRESS NOTES
Subjective   Patient ID: Walker Cuenca IV is a 53 y.o. male who presents for Follow-up (1 mo fu).    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  He is only on 1 immunosuppressive agent called tacrolimus.  There is no other complications of liver transplant to be seen.    GI Problem  The primary symptoms include fatigue, abdominal pain and diarrhea. Primary symptoms do not include fever, weight loss, nausea, vomiting, melena, hematemesis, jaundice, hematochezia, dysuria, myalgias, arthralgias or rash. The illness began today. The onset was gradual.   The fatigue began today. The fatigue has been unchanged since its onset.   The abdominal pain began today. The abdominal pain is located in the RUQ.   The illness does not include chills or anorexia. Associated medical issues do not include inflammatory bowel disease. Associated medical issues comments: has liver transplant.        Review of Systems   Constitutional:  Positive for fatigue. Negative for chills, fever and weight loss.        Wt gain   Respiratory:  Negative for cough, choking and shortness of breath.    Cardiovascular: Negative.    Gastrointestinal:  Positive for abdominal pain and diarrhea. Negative for abdominal distention, anorexia, hematemesis, hematochezia, jaundice, melena, nausea and vomiting.   Genitourinary:  Negative for  "dysuria.   Musculoskeletal: Negative.  Negative for arthralgias and myalgias.   Skin: Negative.  Negative for rash.   Neurological: Negative.    Hematological:  Does not bruise/bleed easily.       Objective   /78 (BP Location: Left arm, Patient Position: Sitting, BP Cuff Size: Adult)   Pulse 78   Temp 35.9 °C (96.6 °F) (Temporal)   Ht 1.727 m (5' 8\")   Wt 76.2 kg (168 lb)   BMI 25.54 kg/m²     Physical Exam  Vitals reviewed.   Constitutional:       Appearance: Normal appearance. He is normal weight.   HENT:      Head: Normocephalic and atraumatic.   Eyes:      Conjunctiva/sclera: Conjunctivae normal.      Comments: Improving pallor   Cardiovascular:      Rate and Rhythm: Normal rate and regular rhythm.      Pulses: Normal pulses.   Pulmonary:      Effort: Pulmonary effort is normal.      Breath sounds: Normal breath sounds.   Abdominal:      Palpations: Abdomen is soft. There is no mass.      Tenderness: There is abdominal tenderness. There is no guarding.   Musculoskeletal:         General: No tenderness. Normal range of motion.      Cervical back: Neck supple.   Skin:     General: Skin is warm and dry.      Coloration: Skin is not pale.      Findings: No bruising or erythema.   Neurological:      General: No focal deficit present.   Psychiatric:         Mood and Affect: Mood normal.         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  He can discontinue sodium chloride tablets because hyponatremia may not be an issue, he will continue his carvedilol, he will continue his NPH insulin, blood sugars are better, abdomen is not distended, portal hypertension could have been subsided by now, he could return back to work by beginning of February perhaps, very glad to see him doing so well after having the miserable life with the frequent ascites and almost decompensated a terminal liver failure.  Follow-up in 2 months.

## 2024-01-09 ENCOUNTER — PATIENT OUTREACH (OUTPATIENT)
Dept: PRIMARY CARE | Facility: CLINIC | Age: 54
End: 2024-01-09
Payer: COMMERCIAL

## 2024-01-09 DIAGNOSIS — E11.9 TYPE 2 DIABETES MELLITUS WITHOUT COMPLICATION, WITHOUT LONG-TERM CURRENT USE OF INSULIN (MULTI): ICD-10-CM

## 2024-01-09 DIAGNOSIS — I10 HYPERTENSION, UNSPECIFIED TYPE: ICD-10-CM

## 2024-01-09 PROCEDURE — 99490 CHRNC CARE MGMT STAFF 1ST 20: CPT | Performed by: INTERNAL MEDICINE

## 2024-01-09 NOTE — PROGRESS NOTES
Spoke with pt who is medically doing much better since liver transplant. Continues to be followed by transplant center. Med list updated at last appointment last month. Blood sugars have improved and continues on NPH insulin. Has had no further ascites. Pt states he feels so much better and may be returning back to work next month. No refills needed at this time. Advised to call this nurse with any questions or concerns.

## 2024-01-17 ENCOUNTER — APPOINTMENT (OUTPATIENT)
Dept: PULMONOLOGY | Facility: CLINIC | Age: 54
End: 2024-01-17
Payer: COMMERCIAL

## 2024-01-22 ENCOUNTER — PATIENT OUTREACH (OUTPATIENT)
Dept: PRIMARY CARE | Facility: CLINIC | Age: 54
End: 2024-01-22
Payer: COMMERCIAL

## 2024-02-28 ENCOUNTER — OFFICE VISIT (OUTPATIENT)
Dept: PRIMARY CARE | Facility: CLINIC | Age: 54
End: 2024-02-28
Payer: COMMERCIAL

## 2024-02-28 VITALS
HEART RATE: 69 BPM | SYSTOLIC BLOOD PRESSURE: 120 MMHG | BODY MASS INDEX: 26.81 KG/M2 | WEIGHT: 181 LBS | DIASTOLIC BLOOD PRESSURE: 78 MMHG | HEIGHT: 69 IN | TEMPERATURE: 96.8 F

## 2024-02-28 DIAGNOSIS — D84.9 IMMUNOSUPPRESSED STATUS (MULTI): Primary | ICD-10-CM

## 2024-02-28 DIAGNOSIS — I25.10 ATHEROSCLEROSIS OF NATIVE CORONARY ARTERY OF NATIVE HEART WITHOUT ANGINA PECTORIS: ICD-10-CM

## 2024-02-28 DIAGNOSIS — J45.20 MILD INTERMITTENT ASTHMA WITHOUT COMPLICATION (HHS-HCC): ICD-10-CM

## 2024-02-28 DIAGNOSIS — D63.8 ANEMIA IN OTHER CHRONIC DISEASES CLASSIFIED ELSEWHERE: ICD-10-CM

## 2024-02-28 DIAGNOSIS — Z94.4 LIVER TRANSPLANTED (MULTI): ICD-10-CM

## 2024-02-28 DIAGNOSIS — I10 ESSENTIAL HYPERTENSION: ICD-10-CM

## 2024-02-28 DIAGNOSIS — E11.9 CONTROLLED TYPE 2 DIABETES MELLITUS WITHOUT COMPLICATION, WITHOUT LONG-TERM CURRENT USE OF INSULIN (MULTI): ICD-10-CM

## 2024-02-28 PROCEDURE — 1036F TOBACCO NON-USER: CPT | Performed by: INTERNAL MEDICINE

## 2024-02-28 PROCEDURE — 99214 OFFICE O/P EST MOD 30 MIN: CPT | Performed by: INTERNAL MEDICINE

## 2024-02-28 PROCEDURE — 3008F BODY MASS INDEX DOCD: CPT | Performed by: INTERNAL MEDICINE

## 2024-02-28 PROCEDURE — 3074F SYST BP LT 130 MM HG: CPT | Performed by: INTERNAL MEDICINE

## 2024-02-28 PROCEDURE — 3078F DIAST BP <80 MM HG: CPT | Performed by: INTERNAL MEDICINE

## 2024-02-28 RX ORDER — BLOOD SUGAR DIAGNOSTIC
1 STRIP MISCELLANEOUS 2 TIMES DAILY
Qty: 200 STRIP | Refills: 3 | Status: SHIPPED | OUTPATIENT
Start: 2024-02-28 | End: 2024-03-29 | Stop reason: SDUPTHER

## 2024-02-28 ASSESSMENT — ENCOUNTER SYMPTOMS
ABDOMINAL DISTENTION: 0
RESPIRATORY NEGATIVE: 1
GASTROINTESTINAL NEGATIVE: 1
CARDIOVASCULAR NEGATIVE: 1
WEAKNESS: 0
ANOREXIA: 0
ABDOMINAL PAIN: 0
HEADACHES: 0
BACK PAIN: 1
ARTHRALGIAS: 0
CONSTITUTIONAL NEGATIVE: 1
NUMBNESS: 0
NAUSEA: 0
DIABETIC ASSOCIATED SYMPTOMS: 0
DIZZINESS: 0

## 2024-02-28 NOTE — PROGRESS NOTES
Subjective   Patient ID: Walker Cuenca IV is a 53 y.o. male who presents for Follow-up (2 mo fu and lower back pain x 2 days).    Heart Problem  This is a chronic problem. The current episode started more than 1 year ago. The problem occurs rarely. The problem has been resolved. Pertinent negatives include no abdominal pain, anorexia, arthralgias, congestion, headaches, nausea, numbness or weakness. The treatment provided significant relief.   Diabetes  He presents for his follow-up diabetic visit. He has type 2 diabetes mellitus. His disease course has been stable. Pertinent negatives for hypoglycemia include no dizziness or headaches. There are no diabetic associated symptoms. Pertinent negatives for diabetes include no weakness. Symptoms are stable. Diabetic complications include heart disease. Risk factors for coronary artery disease include diabetes mellitus. There is no change in his home blood glucose trend. An ACE inhibitor/angiotensin II receptor blocker is not being taken.      GI Problem  The primary symptoms include fatigue, abdominal pain and diarrhea. Primary symptoms do not include fever, weight loss, nausea, vomiting, melena, hematemesis, jaundice, hematochezia, dysuria, myalgias, arthralgias or rash. The illness began today. The onset was gradual.   The fatigue began today. The fatigue has been unchanged since its onset.   The abdominal pain began today. The abdominal pain is located in the RUQ.   The illness does not include chills or anorexia. Associated medical issues do not include inflammatory bowel disease. Associated medical issues comments: has liver transplant.     Review of Systems   Constitutional: Negative.         Gained wt.   HENT: Negative.  Negative for congestion.    Respiratory: Negative.     Cardiovascular: Negative.    Gastrointestinal: Negative.  Negative for abdominal distention, abdominal pain, anorexia and nausea.   Musculoskeletal:  Positive for back pain. Negative for  "arthralgias.   Neurological:  Negative for dizziness, weakness, numbness and headaches.       Objective   /78 (BP Location: Left arm, Patient Position: Sitting, BP Cuff Size: Adult)   Pulse 69   Temp 36 °C (96.8 °F) (Temporal)   Ht 1.74 m (5' 8.5\")   Wt 82.1 kg (181 lb)   BMI 27.12 kg/m²     Physical Exam  Vitals reviewed.   Constitutional:       Appearance: Normal appearance. He is obese.   HENT:      Head: Normocephalic and atraumatic.   Eyes:      Conjunctiva/sclera: Conjunctivae normal.   Cardiovascular:      Rate and Rhythm: Normal rate and regular rhythm.      Pulses: Normal pulses.   Pulmonary:      Effort: Pulmonary effort is normal.      Breath sounds: Normal breath sounds.   Abdominal:      General: There is no distension.      Palpations: Abdomen is soft. There is no mass.   Musculoskeletal:         General: Tenderness present. No swelling.      Cervical back: Neck supple.   Skin:     General: Skin is warm and dry.   Neurological:      General: No focal deficit present.   Psychiatric:         Mood and Affect: Mood normal.       Assessment/Plan   Problem List Items Addressed This Visit             ICD-10-CM    Atherosclerotic heart disease of native coronary artery without angina pectoris I25.10    Controlled type 2 diabetes mellitus without complication, without long-term current use of insulin (CMS/[facility]) E11.9    Relevant Medications    OneTouch Ultra Test strip    Essential hypertension I10    Anemia in other chronic diseases classified elsewhere D63.8    Liver transplanted (CMS/[facility]) Z94.4    Immunosuppressed status (CMS/[facility]) - Primary D84.9    Mild intermittent asthma without complication J45.20   He is a liver transplant patient, now he is only on tacrolimus.  Antiviral and antibacterial prophylaxis has been taken off.  He looks much better, he gained all that weight, his hemoglobin A1c was 6, hemoglobin is 8.6, he required ERCP, there was a stricture of CBD, the ducts were smaller on the " transplanted liver this day.  Transplant related follow-ups were reviewed, he is about to lose his job when he is actually ready to go back to work.  It is a difficult situation for him, he remains immunocompromise, he does not have any flareup of asthma or coughing, he does not have any episode of chest pains, liver transplant has been a very successful venture.  All the report and information from [facility] transplant department were reviewed, a lot of medications has been taken off, sodium level is better, creatinine is better, he should be able to go back to work and if that is not working out and then he really does not feel good then he should be looking for disability.  I think most likely disability would be the better way to go because of his complicated medical regimen and being a transplant patient and immunocompromise host.  Follow-up in 2 months.

## 2024-02-29 ENCOUNTER — PATIENT OUTREACH (OUTPATIENT)
Dept: PRIMARY CARE | Facility: CLINIC | Age: 54
End: 2024-02-29
Payer: COMMERCIAL

## 2024-02-29 DIAGNOSIS — E11.9 TYPE 2 DIABETES MELLITUS WITHOUT COMPLICATION, WITHOUT LONG-TERM CURRENT USE OF INSULIN (MULTI): ICD-10-CM

## 2024-02-29 DIAGNOSIS — I10 HYPERTENSION, UNSPECIFIED TYPE: ICD-10-CM

## 2024-02-29 PROCEDURE — 99490 CHRNC CARE MGMT STAFF 1ST 20: CPT | Performed by: INTERNAL MEDICINE

## 2024-02-29 NOTE — PROGRESS NOTES
Pt had appt with PCP yest and continues to do well from liver transplant. DM is controlled and many meds have been dc'd. A1C- 6. Pt feels so much better. Current med list updated.

## 2024-03-08 ENCOUNTER — PATIENT OUTREACH (OUTPATIENT)
Dept: PRIMARY CARE | Facility: CLINIC | Age: 54
End: 2024-03-08
Payer: COMMERCIAL

## 2024-03-08 ENCOUNTER — DOCUMENTATION (OUTPATIENT)
Dept: PRIMARY CARE | Facility: CLINIC | Age: 54
End: 2024-03-08
Payer: COMMERCIAL

## 2024-03-08 NOTE — PROGRESS NOTES
Discharge facility: [facility]  Discharge diagnosis: LETITIA, abdominal pain, nausea  Admission date: 3/3/24  Discharge date: 3/7/24    PCP Appointment Date: TBD - pt did not want to make appt right now.  Specialist Appointment Date: urology (3/11/24)  Hospital Encounter and Summary: Linked   See Discharge assessment below for further details:  Engagement  Call Start Time: 0304 (3/8/2024  3:06 PM)    Medications  Medications reviewed with patient/caregiver?: Yes (3/8/2024  3:06 PM)  Is the patient having any side effects they believe may be caused by any medication additions or changes?: No (3/8/2024  3:06 PM)  Does the patient have all medications ordered at discharge?: Yes (3/8/2024  3:06 PM)  Care Management Interventions: No intervention needed (3/8/2024  3:06 PM)  Is the patient taking all medications as directed (includes completed medication regime)?: Yes (3/8/2024  3:06 PM)  Care Management Interventions: Provided patient education (3/8/2024  3:06 PM)    Appointments  Does the patient have a primary care provider?: Yes (3/8/2024  3:06 PM)  Care Management Interventions: Educated patient on importance of making appointment (3/8/2024  3:06 PM)  Has the patient kept scheduled appointments due by today?: Yes (3/8/2024  3:06 PM)  Care Management Interventions: Educated on importance of keeping appointment (3/8/2024  3:06 PM)  Follow Up Tasks: Appointments (3/8/2024  3:06 PM)    Self Management  What is the home health agency?: NA (3/8/2024  3:06 PM)  Has home health visited the patient within 72 hours of discharge?: Not applicable (3/8/2024  3:06 PM)    Patient Teaching  Does the patient have access to their discharge instructions?: Yes (3/8/2024  3:06 PM)  Care Management Interventions: Reviewed instructions with patient (3/8/2024  3:06 PM)  What is the patient's perception of their health status since discharge?: Improving (3/8/2024  3:06 PM)  Is the patient/caregiver able to teach back the hierarchy of who  to call/visit for symptoms/problems? PCP, Specialist, Home Health nurse, Urgent Care, ED, 911: Yes (3/8/2024  3:06 PM)    Wrap Up  Is the patient/caregiver familiar with Advance Care Planning?: Yes (3/8/2024  3:06 PM)  Would the patient like more information on Advance Care Planning?: No (3/8/2024  3:06 PM)  Wrap Up Additional Comments: Pt is having no pain or nausea at this time. DC medications reviewed. Pt still needs to  a few prescriptions from pharmacy. Did not want to make an appt with PCP at this time. Stated he has too many other appts to follow up on right now. (3/8/2024  3:06 PM)  Call End Time: 0308 (3/8/2024  3:06 PM)

## 2024-03-22 ENCOUNTER — DOCUMENTATION (OUTPATIENT)
Dept: TRANSPLANT | Facility: HOSPITAL | Age: 54
End: 2024-03-22
Payer: COMMERCIAL

## 2024-03-22 NOTE — PROGRESS NOTES
Successfully removed [facility] waitlist registration for Walker Cuenca. Patient transplanted at another center on 10/24/2023.

## 2024-03-25 ENCOUNTER — PATIENT OUTREACH (OUTPATIENT)
Dept: PRIMARY CARE | Facility: CLINIC | Age: 54
End: 2024-03-25
Payer: COMMERCIAL

## 2024-03-25 DIAGNOSIS — E11.9 TYPE 2 DIABETES MELLITUS WITHOUT COMPLICATION, WITHOUT LONG-TERM CURRENT USE OF INSULIN (MULTI): ICD-10-CM

## 2024-03-25 DIAGNOSIS — I10 HYPERTENSION, UNSPECIFIED TYPE: ICD-10-CM

## 2024-03-25 PROCEDURE — 99490 CHRNC CARE MGMT STAFF 1ST 20: CPT | Performed by: INTERNAL MEDICINE

## 2024-03-25 NOTE — PROGRESS NOTES
Meds reviewed at transplant center.  Pt following up with several other specialists and did not want to schedule anything with PCP at this time. Has already scheduled appt for next month.  Aware to call this nurse with questions or concerns.

## 2024-03-25 NOTE — PROGRESS NOTES
Pt following up with other specialists and did not want another appt with PCP at this time. Next scheduled visit is next month.

## 2024-03-29 DIAGNOSIS — E78.2 MIXED HYPERLIPIDEMIA: ICD-10-CM

## 2024-03-29 DIAGNOSIS — E11.9 CONTROLLED TYPE 2 DIABETES MELLITUS WITHOUT COMPLICATION, WITHOUT LONG-TERM CURRENT USE OF INSULIN (MULTI): ICD-10-CM

## 2024-03-29 DIAGNOSIS — K21.9 GASTROESOPHAGEAL REFLUX DISEASE WITHOUT ESOPHAGITIS: ICD-10-CM

## 2024-03-29 DIAGNOSIS — E03.9 ACQUIRED HYPOTHYROIDISM: ICD-10-CM

## 2024-03-29 RX ORDER — PANTOPRAZOLE SODIUM 40 MG/1
40 TABLET, DELAYED RELEASE ORAL
Qty: 90 TABLET | Refills: 1 | Status: SHIPPED | OUTPATIENT
Start: 2024-03-29

## 2024-03-29 RX ORDER — BLOOD SUGAR DIAGNOSTIC
1 STRIP MISCELLANEOUS 2 TIMES DAILY
Qty: 200 STRIP | Refills: 3 | Status: SHIPPED | OUTPATIENT
Start: 2024-03-29

## 2024-03-29 RX ORDER — ATORVASTATIN CALCIUM 20 MG/1
20 TABLET, FILM COATED ORAL DAILY
Qty: 90 TABLET | Refills: 1 | Status: SHIPPED | OUTPATIENT
Start: 2024-03-29

## 2024-03-29 RX ORDER — LEVOTHYROXINE SODIUM 50 UG/1
50 TABLET ORAL DAILY
Qty: 90 TABLET | Refills: 1 | Status: SHIPPED | OUTPATIENT
Start: 2024-03-29

## 2024-04-29 ENCOUNTER — OFFICE VISIT (OUTPATIENT)
Dept: PRIMARY CARE | Facility: CLINIC | Age: 54
End: 2024-04-29
Payer: COMMERCIAL

## 2024-04-29 VITALS
DIASTOLIC BLOOD PRESSURE: 80 MMHG | WEIGHT: 194 LBS | TEMPERATURE: 97.5 F | HEART RATE: 77 BPM | SYSTOLIC BLOOD PRESSURE: 120 MMHG | HEIGHT: 69 IN | BODY MASS INDEX: 28.73 KG/M2

## 2024-04-29 DIAGNOSIS — D84.9 IMMUNOSUPPRESSED STATUS (MULTI): ICD-10-CM

## 2024-04-29 DIAGNOSIS — I25.10 ATHEROSCLEROSIS OF NATIVE CORONARY ARTERY OF NATIVE HEART WITHOUT ANGINA PECTORIS: Primary | ICD-10-CM

## 2024-04-29 DIAGNOSIS — Z94.4 LIVER TRANSPLANTED (MULTI): ICD-10-CM

## 2024-04-29 DIAGNOSIS — I10 ESSENTIAL HYPERTENSION: ICD-10-CM

## 2024-04-29 DIAGNOSIS — E11.9 CONTROLLED TYPE 2 DIABETES MELLITUS WITHOUT COMPLICATION, WITHOUT LONG-TERM CURRENT USE OF INSULIN (MULTI): ICD-10-CM

## 2024-04-29 PROCEDURE — 1036F TOBACCO NON-USER: CPT | Performed by: INTERNAL MEDICINE

## 2024-04-29 PROCEDURE — 3008F BODY MASS INDEX DOCD: CPT | Performed by: INTERNAL MEDICINE

## 2024-04-29 PROCEDURE — 3074F SYST BP LT 130 MM HG: CPT | Performed by: INTERNAL MEDICINE

## 2024-04-29 PROCEDURE — 3079F DIAST BP 80-89 MM HG: CPT | Performed by: INTERNAL MEDICINE

## 2024-04-29 PROCEDURE — 99213 OFFICE O/P EST LOW 20 MIN: CPT | Performed by: INTERNAL MEDICINE

## 2024-04-29 RX ORDER — FLUDROCORTISONE ACETATE 0.1 MG/1
0.1 TABLET ORAL
COMMUNITY
Start: 2024-04-25

## 2024-04-29 RX ORDER — PATIROMER 8.4 G/1
8.4 POWDER, FOR SUSPENSION ORAL
COMMUNITY
Start: 2024-04-25

## 2024-04-29 ASSESSMENT — ENCOUNTER SYMPTOMS
CONSTITUTIONAL NEGATIVE: 1
CHOKING: 0
COUGH: 0
ABDOMINAL PAIN: 1
WEAKNESS: 0
ABDOMINAL DISTENTION: 0
BACK PAIN: 1

## 2024-04-29 NOTE — PROGRESS NOTES
Subjective   Patient ID: Walker Cuenca IV is a 53 y.o. male who presents for Follow-up (3 mo fu).    HPI   Heart Problem  This is a chronic problem. The current episode started more than 1 year ago. The problem occurs rarely. The problem has been resolved. Pertinent negatives include no abdominal pain, anorexia, arthralgias, congestion, headaches, nausea, numbness or weakness. The treatment provided significant relief. Has occasional chest pains.  Diabetes  He presents for his follow-up diabetic visit. He has type 2 diabetes mellitus. His disease course has been stable. Pertinent negatives for hypoglycemia include no dizziness or headaches. There are no diabetic associated symptoms. Pertinent negatives for diabetes include no weakness. Symptoms are stable. Diabetic complications include heart disease. Risk factors for coronary artery disease include diabetes mellitus. There is no change in his home blood glucose trend. An ACE inhibitor/angiotensin II receptor blocker is not being taken.      GI Problem  The primary symptoms include fatigue, abdominal pain and diarrhea. Primary symptoms do not include fever, weight loss, nausea, vomiting, melena, hematemesis, jaundice, hematochezia, dysuria, myalgias, arthralgias or rash. The illness began today. The onset was gradual.   The fatigue off and on. The fatigue has been unchanged since its onset.   The abdominal pain off and on. The abdominal pain is located in the RUQ.   The illness does not include chills or anorexia. Associated medical issues do not include inflammatory bowel disease. Associated medical issues comments: has liver transplant. Transplant related follow up reviewed. Recent labs reviewed.  Review of Systems   Constitutional: Negative.         Back to baseline wt.   Respiratory:  Negative for cough and choking.    Cardiovascular:  Positive for chest pain.   Gastrointestinal:  Positive for abdominal pain. Negative for abdominal distention.   Musculoskeletal:   "Positive for back pain.   Neurological:  Negative for weakness.       Objective   /80 (BP Location: Right arm, Patient Position: Sitting, BP Cuff Size: Adult)   Pulse 77   Temp 36.4 °C (97.5 °F) (Temporal)   Ht 1.74 m (5' 8.5\")   Wt 88 kg (194 lb)   BMI 29.07 kg/m²     Physical Exam  Vitals reviewed.   Constitutional:       Appearance: Normal appearance. He is normal weight. He is not ill-appearing or toxic-appearing.   HENT:      Head: Normocephalic and atraumatic.   Eyes:      Conjunctiva/sclera: Conjunctivae normal.   Cardiovascular:      Rate and Rhythm: Normal rate and regular rhythm.      Pulses: Normal pulses.   Pulmonary:      Effort: Pulmonary effort is normal.      Breath sounds: Normal breath sounds.   Abdominal:      General: There is no distension.      Palpations: Abdomen is soft.      Tenderness: There is no abdominal tenderness.      Hernia: A hernia is present. Hernia is present in the umbilical area.   Musculoskeletal:         General: Normal range of motion.      Cervical back: Neck supple.   Skin:     General: Skin is warm and dry.   Neurological:      General: No focal deficit present.       Assessment/Plan   Problem List Items Addressed This Visit             ICD-10-CM    Atherosclerotic heart disease of native coronary artery without angina pectoris - Primary I25.10    Controlled type 2 diabetes mellitus without complication, without long-term current use of insulin (Multi) E11.9    Essential hypertension I10    Liver transplanted (Multi) Z94.4    Immunosuppressed status (Multi) D84.9   Transplant related follow-up was reviewed, actually he looks back to normal, transplant actually has given him a second life, he has infrequent episodes of chest pains.  [facility] is going to check him out for this chest pain episodes, his BP readings are stable, blood sugars are fluctuating, he remains on tacrolimus, he remains on appropriate immunosuppressive treatment, he is on " fludrocortisone and Veltassa.  Having transplanted liver having immune compromised state he has done very well, recent ERCP was reviewed, plastic stent was inserted old stent was removed.  He is back to his work, he got a full Social Security disability benefits.  I told him that his total care has to be done at [facility], having transplant at [facility] save his life.  Periodic assessment and follow-up will be done and no test were ordered by me.

## 2024-06-02 ENCOUNTER — HOSPITAL ENCOUNTER (INPATIENT)
Facility: HOSPITAL | Age: 54
LOS: 3 days | Discharge: HOME | DRG: 184 | End: 2024-06-06
Attending: STUDENT IN AN ORGANIZED HEALTH CARE EDUCATION/TRAINING PROGRAM | Admitting: SURGERY
Payer: COMMERCIAL

## 2024-06-02 ENCOUNTER — APPOINTMENT (OUTPATIENT)
Dept: RADIOLOGY | Facility: HOSPITAL | Age: 54
DRG: 184 | End: 2024-06-02
Payer: COMMERCIAL

## 2024-06-02 DIAGNOSIS — S22.41XA CLOSED FRACTURE OF MULTIPLE RIBS OF RIGHT SIDE, INITIAL ENCOUNTER: Primary | ICD-10-CM
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
## 2024-06-02 PROCEDURE — 72131 CT LUMBAR SPINE W/O DYE: CPT | Performed by: STUDENT IN AN ORGANIZED HEALTH CARE EDUCATION/TRAINING PROGRAM

## 2024-06-02 PROCEDURE — 74176 CT ABD & PELVIS W/O CONTRAST: CPT | Performed by: STUDENT IN AN ORGANIZED HEALTH CARE EDUCATION/TRAINING PROGRAM

## 2024-06-02 PROCEDURE — 72128 CT CHEST SPINE W/O DYE: CPT | Performed by: STUDENT IN AN ORGANIZED HEALTH CARE EDUCATION/TRAINING PROGRAM

## 2024-06-02 PROCEDURE — 72131 CT LUMBAR SPINE W/O DYE: CPT | Mod: RCN

## 2024-06-02 PROCEDURE — 72128 CT CHEST SPINE W/O DYE: CPT | Mod: RCN

## 2024-06-02 PROCEDURE — 71250 CT THORAX DX C-: CPT | Performed by: STUDENT IN AN ORGANIZED HEALTH CARE EDUCATION/TRAINING PROGRAM

## 2024-06-02 PROCEDURE — 74176 CT ABD & PELVIS W/O CONTRAST: CPT

## 2024-06-02 PROCEDURE — 99285 EMERGENCY DEPT VISIT HI MDM: CPT

## 2024-06-02 RX ORDER — LIDOCAINE 560 MG/1
2 PATCH PERCUTANEOUS; TOPICAL; TRANSDERMAL ONCE
Status: COMPLETED | OUTPATIENT
Start: 2024-06-02 | End: 2024-06-03

## 2024-06-02 RX ORDER — MORPHINE SULFATE 4 MG/ML
4 INJECTION, SOLUTION INTRAMUSCULAR; INTRAVENOUS ONCE
Status: COMPLETED | OUTPATIENT
Start: 2024-06-02 | End: 2024-06-03

## 2024-06-02 ASSESSMENT — PAIN DESCRIPTION - DESCRIPTORS: DESCRIPTORS: SHARP

## 2024-06-02 ASSESSMENT — LIFESTYLE VARIABLES
HAVE YOU EVER FELT YOU SHOULD CUT DOWN ON YOUR DRINKING: NO
EVER HAD A DRINK FIRST THING IN THE MORNING TO STEADY YOUR NERVES TO GET RID OF A HANGOVER: NO
EVER FELT BAD OR GUILTY ABOUT YOUR DRINKING: NO
TOTAL SCORE: 0
HAVE PEOPLE ANNOYED YOU BY CRITICIZING YOUR DRINKING: NO

## 2024-06-02 ASSESSMENT — COLUMBIA-SUICIDE SEVERITY RATING SCALE - C-SSRS
2. HAVE YOU ACTUALLY HAD ANY THOUGHTS OF KILLING YOURSELF?: NO
6. HAVE YOU EVER DONE ANYTHING, STARTED TO DO ANYTHING, OR PREPARED TO DO ANYTHING TO END YOUR LIFE?: NO
1. IN THE PAST MONTH, HAVE YOU WISHED YOU WERE DEAD OR WISHED YOU COULD GO TO SLEEP AND NOT WAKE UP?: NO

## 2024-06-02 ASSESSMENT — PAIN DESCRIPTION - ORIENTATION: ORIENTATION: RIGHT

## 2024-06-02 ASSESSMENT — PAIN DESCRIPTION - FREQUENCY: FREQUENCY: CONSTANT/CONTINUOUS

## 2024-06-02 ASSESSMENT — PAIN SCALES - GENERAL: PAINLEVEL_OUTOF10: 10 - WORST POSSIBLE PAIN

## 2024-06-02 ASSESSMENT — PAIN DESCRIPTION - PAIN TYPE: TYPE: ACUTE PAIN

## 2024-06-02 ASSESSMENT — PAIN DESCRIPTION - LOCATION: LOCATION: BACK

## 2024-06-02 ASSESSMENT — PAIN - FUNCTIONAL ASSESSMENT: PAIN_FUNCTIONAL_ASSESSMENT: 0-10

## 2024-06-03 PROBLEM — S22.41XA CLOSED FRACTURE OF MULTIPLE RIBS OF RIGHT SIDE, INITIAL ENCOUNTER: Status: ACTIVE | Noted: 2024-06-03

## 2024-06-03 LAB
ABO GROUP (TYPE) IN BLOOD: NORMAL
ALBUMIN SERPL BCP-MCNC: 4.5 G/DL (ref 3.4–5)
ALP SERPL-CCNC: 118 U/L (ref 33–120)
ALT SERPL W P-5'-P-CCNC: 16 U/L (ref 10–52)
ANION GAP SERPL CALC-SCNC: 14 MMOL/L (ref 10–20)
ANTIBODY SCREEN: NORMAL
APTT PPP: 30 SECONDS (ref 27–38)
AST SERPL W P-5'-P-CCNC: 12 U/L (ref 9–39)
BASOPHILS # BLD AUTO: 0.02 X10*3/UL (ref 0–0.1)
BASOPHILS NFR BLD AUTO: 0.3 %
BILIRUB SERPL-MCNC: 0.8 MG/DL (ref 0–1.2)
BUN SERPL-MCNC: 57 MG/DL (ref 6–23)
CALCIUM SERPL-MCNC: 8.9 MG/DL (ref 8.6–10.3)
CHLORIDE SERPL-SCNC: 106 MMOL/L (ref 98–107)
CO2 SERPL-SCNC: 16 MMOL/L (ref 21–32)
CREAT SERPL-MCNC: 2.39 MG/DL (ref 0.5–1.3)
EGFRCR SERPLBLD CKD-EPI 2021: 31 ML/MIN/1.73M*2
EOSINOPHIL # BLD AUTO: 0.1 X10*3/UL (ref 0–0.7)
EOSINOPHIL NFR BLD AUTO: 1.5 %
ERYTHROCYTE [DISTWIDTH] IN BLOOD BY AUTOMATED COUNT: 13.3 % (ref 11.5–14.5)
ERYTHROCYTE [DISTWIDTH] IN BLOOD BY AUTOMATED COUNT: 13.6 % (ref 11.5–14.5)
ERYTHROCYTE [DISTWIDTH] IN BLOOD BY AUTOMATED COUNT: 13.6 % (ref 11.5–14.5)
ERYTHROCYTE [DISTWIDTH] IN BLOOD BY AUTOMATED COUNT: 13.8 % (ref 11.5–14.5)
GLUCOSE BLD MANUAL STRIP-MCNC: 208 MG/DL (ref 74–99)
GLUCOSE BLD MANUAL STRIP-MCNC: 251 MG/DL (ref 74–99)
GLUCOSE BLD MANUAL STRIP-MCNC: 261 MG/DL (ref 74–99)
GLUCOSE SERPL-MCNC: 311 MG/DL (ref 74–99)
HCT VFR BLD AUTO: 30.6 % (ref 41–52)
HCT VFR BLD AUTO: 30.9 % (ref 41–52)
HCT VFR BLD AUTO: 31.4 % (ref 41–52)
HCT VFR BLD AUTO: 33.4 % (ref 41–52)
HGB BLD-MCNC: 10.7 G/DL (ref 13.5–17.5)
HGB BLD-MCNC: 10.8 G/DL (ref 13.5–17.5)
HGB BLD-MCNC: 11.1 G/DL (ref 13.5–17.5)
HGB BLD-MCNC: 11.6 G/DL (ref 13.5–17.5)
HOLD SPECIMEN: NORMAL
IMM GRANULOCYTES # BLD AUTO: 0.02 X10*3/UL (ref 0–0.7)
IMM GRANULOCYTES NFR BLD AUTO: 0.3 % (ref 0–0.9)
INR PPP: 1 (ref 0.9–1.1)
LYMPHOCYTES # BLD AUTO: 2.28 X10*3/UL (ref 1.2–4.8)
LYMPHOCYTES NFR BLD AUTO: 34 %
MCH RBC QN AUTO: 28.8 PG (ref 26–34)
MCH RBC QN AUTO: 28.9 PG (ref 26–34)
MCHC RBC AUTO-ENTMCNC: 34.7 G/DL (ref 32–36)
MCHC RBC AUTO-ENTMCNC: 35 G/DL (ref 32–36)
MCHC RBC AUTO-ENTMCNC: 35 G/DL (ref 32–36)
MCHC RBC AUTO-ENTMCNC: 35.4 G/DL (ref 32–36)
MCV RBC AUTO: 82 FL (ref 80–100)
MCV RBC AUTO: 82 FL (ref 80–100)
MCV RBC AUTO: 83 FL (ref 80–100)
MCV RBC AUTO: 83 FL (ref 80–100)
MONOCYTES # BLD AUTO: 0.5 X10*3/UL (ref 0.1–1)
MONOCYTES NFR BLD AUTO: 7.5 %
NEUTROPHILS # BLD AUTO: 3.79 X10*3/UL (ref 1.2–7.7)
NEUTROPHILS NFR BLD AUTO: 56.4 %
NRBC BLD-RTO: 0 /100 WBCS (ref 0–0)
PLATELET # BLD AUTO: 121 X10*3/UL (ref 150–450)
PLATELET # BLD AUTO: 122 X10*3/UL (ref 150–450)
PLATELET # BLD AUTO: 134 X10*3/UL (ref 150–450)
PLATELET # BLD AUTO: 136 X10*3/UL (ref 150–450)
POTASSIUM SERPL-SCNC: 4.6 MMOL/L (ref 3.5–5.3)
PROT SERPL-MCNC: 6.8 G/DL (ref 6.4–8.2)
PROTHROMBIN TIME: 11.6 SECONDS (ref 9.8–12.8)
RBC # BLD AUTO: 3.7 X10*6/UL (ref 4.5–5.9)
RBC # BLD AUTO: 3.75 X10*6/UL (ref 4.5–5.9)
RBC # BLD AUTO: 3.84 X10*6/UL (ref 4.5–5.9)
RBC # BLD AUTO: 4.02 X10*6/UL (ref 4.5–5.9)
RH FACTOR (ANTIGEN D): NORMAL
SODIUM SERPL-SCNC: 131 MMOL/L (ref 136–145)
WBC # BLD AUTO: 5.1 X10*3/UL (ref 4.4–11.3)
WBC # BLD AUTO: 5.9 X10*3/UL (ref 4.4–11.3)
WBC # BLD AUTO: 5.9 X10*3/UL (ref 4.4–11.3)
WBC # BLD AUTO: 6.7 X10*3/UL (ref 4.4–11.3)

## 2024-06-03 PROCEDURE — 2500000005 HC RX 250 GENERAL PHARMACY W/O HCPCS: Performed by: STUDENT IN AN ORGANIZED HEALTH CARE EDUCATION/TRAINING PROGRAM

## 2024-06-03 PROCEDURE — 86900 BLOOD TYPING SEROLOGIC ABO: CPT

## 2024-06-03 PROCEDURE — 82947 ASSAY GLUCOSE BLOOD QUANT: CPT

## 2024-06-03 PROCEDURE — 96374 THER/PROPH/DIAG INJ IV PUSH: CPT

## 2024-06-03 PROCEDURE — 80053 COMPREHEN METABOLIC PANEL: CPT | Performed by: STUDENT IN AN ORGANIZED HEALTH CARE EDUCATION/TRAINING PROGRAM

## 2024-06-03 PROCEDURE — 36415 COLL VENOUS BLD VENIPUNCTURE: CPT | Performed by: STUDENT IN AN ORGANIZED HEALTH CARE EDUCATION/TRAINING PROGRAM

## 2024-06-03 PROCEDURE — 2500000002 HC RX 250 W HCPCS SELF ADMINISTERED DRUGS (ALT 637 FOR MEDICARE OP, ALT 636 FOR OP/ED): Performed by: REGISTERED NURSE

## 2024-06-03 PROCEDURE — 2500000001 HC RX 250 WO HCPCS SELF ADMINISTERED DRUGS (ALT 637 FOR MEDICARE OP)

## 2024-06-03 PROCEDURE — 36415 COLL VENOUS BLD VENIPUNCTURE: CPT

## 2024-06-03 PROCEDURE — 85025 COMPLETE CBC W/AUTO DIFF WBC: CPT | Performed by: STUDENT IN AN ORGANIZED HEALTH CARE EDUCATION/TRAINING PROGRAM

## 2024-06-03 PROCEDURE — 85730 THROMBOPLASTIN TIME PARTIAL: CPT | Performed by: STUDENT IN AN ORGANIZED HEALTH CARE EDUCATION/TRAINING PROGRAM

## 2024-06-03 PROCEDURE — 85610 PROTHROMBIN TIME: CPT | Performed by: STUDENT IN AN ORGANIZED HEALTH CARE EDUCATION/TRAINING PROGRAM

## 2024-06-03 PROCEDURE — 96376 TX/PRO/DX INJ SAME DRUG ADON: CPT

## 2024-06-03 PROCEDURE — 1210000001 HC SEMI-PRIVATE ROOM DAILY

## 2024-06-03 PROCEDURE — 2500000004 HC RX 250 GENERAL PHARMACY W/ HCPCS (ALT 636 FOR OP/ED)

## 2024-06-03 PROCEDURE — 2500000002 HC RX 250 W HCPCS SELF ADMINISTERED DRUGS (ALT 637 FOR MEDICARE OP, ALT 636 FOR OP/ED)

## 2024-06-03 PROCEDURE — 85027 COMPLETE CBC AUTOMATED: CPT

## 2024-06-03 PROCEDURE — 96375 TX/PRO/DX INJ NEW DRUG ADDON: CPT

## 2024-06-03 PROCEDURE — 2500000004 HC RX 250 GENERAL PHARMACY W/ HCPCS (ALT 636 FOR OP/ED): Performed by: STUDENT IN AN ORGANIZED HEALTH CARE EDUCATION/TRAINING PROGRAM

## 2024-06-03 RX ORDER — NAPROXEN SODIUM 220 MG/1
81 TABLET, FILM COATED ORAL
Status: DISCONTINUED | OUTPATIENT
Start: 2024-06-03 | End: 2024-06-06

## 2024-06-03 RX ORDER — CARVEDILOL 12.5 MG/1
25 TABLET ORAL
Status: DISCONTINUED | OUTPATIENT
Start: 2024-06-03 | End: 2024-06-06 | Stop reason: HOSPADM

## 2024-06-03 RX ORDER — PANTOPRAZOLE SODIUM 40 MG/1
40 TABLET, DELAYED RELEASE ORAL
Status: DISCONTINUED | OUTPATIENT
Start: 2024-06-03 | End: 2024-06-06 | Stop reason: HOSPADM

## 2024-06-03 RX ORDER — ACETAMINOPHEN 10 MG/ML
1000 INJECTION, SOLUTION INTRAVENOUS EVERY 6 HOURS SCHEDULED
Status: COMPLETED | OUTPATIENT
Start: 2024-06-03 | End: 2024-06-04

## 2024-06-03 RX ORDER — LIDOCAINE 560 MG/1
1 PATCH PERCUTANEOUS; TOPICAL; TRANSDERMAL DAILY
Status: DISCONTINUED | OUTPATIENT
Start: 2024-06-03 | End: 2024-06-06 | Stop reason: HOSPADM

## 2024-06-03 RX ORDER — HYDROMORPHONE HYDROCHLORIDE 1 MG/ML
1 INJECTION, SOLUTION INTRAMUSCULAR; INTRAVENOUS; SUBCUTANEOUS ONCE
Status: COMPLETED | OUTPATIENT
Start: 2024-06-03 | End: 2024-06-03

## 2024-06-03 RX ORDER — INSULIN LISPRO 100 [IU]/ML
0-5 INJECTION, SOLUTION INTRAVENOUS; SUBCUTANEOUS EVERY 4 HOURS
Status: DISCONTINUED | OUTPATIENT
Start: 2024-06-03 | End: 2024-06-04

## 2024-06-03 RX ORDER — SENNOSIDES 8.6 MG/1
2 TABLET ORAL 2 TIMES DAILY
Status: DISCONTINUED | OUTPATIENT
Start: 2024-06-03 | End: 2024-06-06 | Stop reason: HOSPADM

## 2024-06-03 RX ORDER — DEXTROSE 50 % IN WATER (D50W) INTRAVENOUS SYRINGE
12.5
Status: DISCONTINUED | OUTPATIENT
Start: 2024-06-03 | End: 2024-06-06 | Stop reason: HOSPADM

## 2024-06-03 RX ORDER — METHOCARBAMOL 500 MG/1
500 TABLET, FILM COATED ORAL EVERY 6 HOURS SCHEDULED
Status: DISCONTINUED | OUTPATIENT
Start: 2024-06-03 | End: 2024-06-06 | Stop reason: HOSPADM

## 2024-06-03 RX ORDER — LEVOTHYROXINE SODIUM 50 UG/1
50 TABLET ORAL DAILY
Status: DISCONTINUED | OUTPATIENT
Start: 2024-06-03 | End: 2024-06-06 | Stop reason: HOSPADM

## 2024-06-03 RX ORDER — SULFAMETHOXAZOLE AND TRIMETHOPRIM 800; 160 MG/1; MG/1
1 TABLET ORAL 3 TIMES WEEKLY
COMMUNITY

## 2024-06-03 RX ORDER — OXYCODONE HYDROCHLORIDE 5 MG/1
5 TABLET ORAL EVERY 6 HOURS
Status: DISCONTINUED | OUTPATIENT
Start: 2024-06-03 | End: 2024-06-06 | Stop reason: HOSPADM

## 2024-06-03 RX ORDER — FLUDROCORTISONE ACETATE 0.1 MG/1
0.1 TABLET ORAL
Status: DISCONTINUED | OUTPATIENT
Start: 2024-06-03 | End: 2024-06-06 | Stop reason: HOSPADM

## 2024-06-03 RX ORDER — DEXTROSE, SODIUM CHLORIDE, SODIUM LACTATE, POTASSIUM CHLORIDE, AND CALCIUM CHLORIDE 5; .6; .31; .03; .02 G/100ML; G/100ML; G/100ML; G/100ML; G/100ML
100 INJECTION, SOLUTION INTRAVENOUS CONTINUOUS
Status: DISCONTINUED | OUTPATIENT
Start: 2024-06-03 | End: 2024-06-04

## 2024-06-03 RX ORDER — DEXTROSE 50 % IN WATER (D50W) INTRAVENOUS SYRINGE
25
Status: DISCONTINUED | OUTPATIENT
Start: 2024-06-03 | End: 2024-06-06 | Stop reason: HOSPADM

## 2024-06-03 RX ORDER — ATORVASTATIN CALCIUM 20 MG/1
20 TABLET, FILM COATED ORAL DAILY
Status: DISCONTINUED | OUTPATIENT
Start: 2024-06-03 | End: 2024-06-06 | Stop reason: HOSPADM

## 2024-06-03 RX ORDER — TACROLIMUS 1 MG/1
5 CAPSULE ORAL
Status: DISCONTINUED | OUTPATIENT
Start: 2024-06-03 | End: 2024-06-06 | Stop reason: HOSPADM

## 2024-06-03 RX ADMIN — HYDROMORPHONE HYDROCHLORIDE 1 MG: 1 INJECTION, SOLUTION INTRAMUSCULAR; INTRAVENOUS; SUBCUTANEOUS at 01:04

## 2024-06-03 RX ADMIN — FLUDROCORTISONE ACETATE 0.1 MG: 0.1 TABLET ORAL at 09:35

## 2024-06-03 RX ADMIN — OXYCODONE HYDROCHLORIDE 5 MG: 5 TABLET ORAL at 11:20

## 2024-06-03 RX ADMIN — HYDROMORPHONE HYDROCHLORIDE 0.2 MG: 0.2 INJECTION, SOLUTION INTRAMUSCULAR; INTRAVENOUS; SUBCUTANEOUS at 08:59

## 2024-06-03 RX ADMIN — MORPHINE SULFATE 4 MG: 4 INJECTION, SOLUTION INTRAMUSCULAR; INTRAVENOUS at 00:05

## 2024-06-03 RX ADMIN — TACROLIMUS 5 MG: 1 CAPSULE ORAL at 11:21

## 2024-06-03 RX ADMIN — HYDROMORPHONE HYDROCHLORIDE 1 MG: 1 INJECTION, SOLUTION INTRAMUSCULAR; INTRAVENOUS; SUBCUTANEOUS at 04:56

## 2024-06-03 RX ADMIN — CARVEDILOL 25 MG: 12.5 TABLET, FILM COATED ORAL at 09:35

## 2024-06-03 RX ADMIN — LIDOCAINE 2 PATCH: 4 PATCH TOPICAL at 00:06

## 2024-06-03 RX ADMIN — TACROLIMUS 5 MG: 1 CAPSULE ORAL at 20:46

## 2024-06-03 RX ADMIN — SENNOSIDES 17.2 MG: 8.6 TABLET, FILM COATED ORAL at 20:46

## 2024-06-03 RX ADMIN — SODIUM CHLORIDE, SODIUM LACTATE, POTASSIUM CHLORIDE, CALCIUM CHLORIDE AND DEXTROSE MONOHYDRATE 100 ML/HR: 5; 600; 310; 30; 20 INJECTION, SOLUTION INTRAVENOUS at 22:24

## 2024-06-03 RX ADMIN — SENNOSIDES 17.2 MG: 8.6 TABLET, FILM COATED ORAL at 09:36

## 2024-06-03 RX ADMIN — SODIUM CHLORIDE, SODIUM LACTATE, POTASSIUM CHLORIDE, CALCIUM CHLORIDE AND DEXTROSE MONOHYDRATE 100 ML/HR: 5; 600; 310; 30; 20 INJECTION, SOLUTION INTRAVENOUS at 09:33

## 2024-06-03 RX ADMIN — INSULIN LISPRO 2 UNITS: 100 INJECTION, SOLUTION INTRAVENOUS; SUBCUTANEOUS at 20:46

## 2024-06-03 RX ADMIN — ACETAMINOPHEN 1000 MG: 10 INJECTION, SOLUTION INTRAVENOUS at 17:54

## 2024-06-03 RX ADMIN — METHOCARBAMOL TABLETS 500 MG: 500 TABLET, COATED ORAL at 23:07

## 2024-06-03 RX ADMIN — INSULIN HUMAN 6 UNITS: 100 INJECTION, SUSPENSION SUBCUTANEOUS at 11:23

## 2024-06-03 RX ADMIN — OXYCODONE HYDROCHLORIDE 5 MG: 5 TABLET ORAL at 17:54

## 2024-06-03 RX ADMIN — ACETAMINOPHEN 1000 MG: 10 INJECTION, SOLUTION INTRAVENOUS at 23:07

## 2024-06-03 RX ADMIN — CARVEDILOL 25 MG: 12.5 TABLET, FILM COATED ORAL at 17:54

## 2024-06-03 RX ADMIN — HYDROMORPHONE HYDROCHLORIDE 0.2 MG: 0.2 INJECTION, SOLUTION INTRAMUSCULAR; INTRAVENOUS; SUBCUTANEOUS at 20:51

## 2024-06-03 RX ADMIN — ACETAMINOPHEN 1000 MG: 10 INJECTION, SOLUTION INTRAVENOUS at 11:20

## 2024-06-03 RX ADMIN — METHOCARBAMOL TABLETS 500 MG: 500 TABLET, COATED ORAL at 11:20

## 2024-06-03 RX ADMIN — ATORVASTATIN CALCIUM 20 MG: 20 TABLET, FILM COATED ORAL at 09:35

## 2024-06-03 RX ADMIN — OXYCODONE HYDROCHLORIDE 5 MG: 5 TABLET ORAL at 23:07

## 2024-06-03 RX ADMIN — PANTOPRAZOLE SODIUM 40 MG: 40 TABLET, DELAYED RELEASE ORAL at 09:35

## 2024-06-03 RX ADMIN — METHOCARBAMOL TABLETS 500 MG: 500 TABLET, COATED ORAL at 17:54

## 2024-06-03 RX ADMIN — LEVOTHYROXINE SODIUM 50 MCG: 50 TABLET ORAL at 09:35

## 2024-06-03 SDOH — ECONOMIC STABILITY: HOUSING INSECURITY: IN THE LAST 12 MONTHS, WAS THERE A TIME WHEN YOU WERE NOT ABLE TO PAY THE MORTGAGE OR RENT ON TIME?: YES

## 2024-06-03 SDOH — ECONOMIC STABILITY: HOUSING INSECURITY: IN THE LAST 12 MONTHS, HOW MANY PLACES HAVE YOU LIVED?: 1

## 2024-06-03 SDOH — SOCIAL STABILITY: SOCIAL INSECURITY: DOES ANYONE TRY TO KEEP YOU FROM HAVING/CONTACTING OTHER FRIENDS OR DOING THINGS OUTSIDE YOUR HOME?: NO

## 2024-06-03 SDOH — SOCIAL STABILITY: SOCIAL INSECURITY: HAS ANYONE EVER THREATENED TO HURT YOUR FAMILY OR YOUR PETS?: NO

## 2024-06-03 SDOH — SOCIAL STABILITY: SOCIAL INSECURITY: HAVE YOU HAD ANY THOUGHTS OF HARMING ANYONE ELSE?: NO

## 2024-06-03 SDOH — SOCIAL STABILITY: SOCIAL INSECURITY: DO YOU FEEL ANYONE HAS EXPLOITED OR TAKEN ADVANTAGE OF YOU FINANCIALLY OR OF YOUR PERSONAL PROPERTY?: NO

## 2024-06-03 SDOH — ECONOMIC STABILITY: HOUSING INSECURITY: IN THE PAST 12 MONTHS HAS THE ELECTRIC, GAS, OIL, OR WATER COMPANY THREATENED TO SHUT OFF SERVICES IN YOUR HOME?: NO

## 2024-06-03 SDOH — ECONOMIC STABILITY: HOUSING INSECURITY
IN THE LAST 12 MONTHS, WAS THERE A TIME WHEN YOU DID NOT HAVE A STEADY PLACE TO SLEEP OR SLEPT IN A SHELTER (INCLUDING NOW)?: NO

## 2024-06-03 SDOH — ECONOMIC STABILITY: INCOME INSECURITY: IN THE LAST 12 MONTHS, WAS THERE A TIME WHEN YOU WERE NOT ABLE TO PAY THE MORTGAGE OR RENT ON TIME?: YES

## 2024-06-03 SDOH — ECONOMIC STABILITY: FOOD INSECURITY: WITHIN THE PAST 12 MONTHS, YOU WORRIED THAT YOUR FOOD WOULD RUN OUT BEFORE YOU GOT MONEY TO BUY MORE.: SOMETIMES TRUE

## 2024-06-03 SDOH — ECONOMIC STABILITY: FOOD INSECURITY: WITHIN THE PAST 12 MONTHS, THE FOOD YOU BOUGHT JUST DIDN'T LAST AND YOU DIDN'T HAVE MONEY TO GET MORE.: SOMETIMES TRUE

## 2024-06-03 SDOH — SOCIAL STABILITY: SOCIAL INSECURITY: DO YOU FEEL UNSAFE GOING BACK TO THE PLACE WHERE YOU ARE LIVING?: NO

## 2024-06-03 SDOH — ECONOMIC STABILITY: FOOD INSECURITY

## 2024-06-03 SDOH — SOCIAL STABILITY: SOCIAL INSECURITY: HAVE YOU HAD THOUGHTS OF HARMING ANYONE ELSE?: NO

## 2024-06-03 SDOH — ECONOMIC STABILITY: FOOD INSECURITY
WITHIN THE PAST 12 MONTHS, YOU WORRIED THAT YOUR FOOD WOULD RUN OUT BEFORE YOU GOT THE MONEY TO BUY MORE.: SOMETIMES TRUE

## 2024-06-03 SDOH — ECONOMIC STABILITY: FOOD INSECURITY: WITHIN THE PAST 12 MONTHS, THE FOOD YOU BOUGHT JUST DIDN’T LAST AND YOU DIDN’T HAVE MONEY TO GET MORE.: SOMETIMES TRUE

## 2024-06-03 SDOH — ECONOMIC STABILITY: TRANSPORTATION INSECURITY
IN THE PAST 12 MONTHS, HAS THE LACK OF TRANSPORTATION KEPT YOU FROM MEDICAL APPOINTMENTS OR FROM GETTING MEDICATIONS?: YES

## 2024-06-03 SDOH — ECONOMIC STABILITY: TRANSPORTATION INSECURITY

## 2024-06-03 SDOH — SOCIAL STABILITY: SOCIAL INSECURITY: ABUSE: ADULT

## 2024-06-03 SDOH — ECONOMIC STABILITY: TRANSPORTATION INSECURITY: IN THE PAST 12 MONTHS, HAS LACK OF TRANSPORTATION KEPT YOU FROM MEDICAL APPOINTMENTS OR FROM GETTING MEDICATIONS?: YES

## 2024-06-03 SDOH — ECONOMIC STABILITY: TRANSPORTATION INSECURITY
IN THE PAST 12 MONTHS, HAS LACK OF TRANSPORTATION KEPT YOU FROM MEETINGS, WORK, OR FROM GETTING THINGS NEEDED FOR DAILY LIVING?: YES

## 2024-06-03 SDOH — ECONOMIC STABILITY: HOUSING INSECURITY

## 2024-06-03 SDOH — SOCIAL STABILITY: SOCIAL INSECURITY: ARE THERE ANY APPARENT SIGNS OF INJURIES/BEHAVIORS THAT COULD BE RELATED TO ABUSE/NEGLECT?: NO

## 2024-06-03 SDOH — SOCIAL STABILITY: SOCIAL INSECURITY: ARE YOU OR HAVE YOU BEEN THREATENED OR ABUSED PHYSICALLY, EMOTIONALLY, OR SEXUALLY BY ANYONE?: NO

## 2024-06-03 SDOH — ECONOMIC STABILITY: GENERAL

## 2024-06-03 ASSESSMENT — PAIN DESCRIPTION - FREQUENCY
FREQUENCY: CONSTANT/CONTINUOUS

## 2024-06-03 ASSESSMENT — COGNITIVE AND FUNCTIONAL STATUS - GENERAL
PATIENT BASELINE BEDBOUND: NO
STANDING UP FROM CHAIR USING ARMS: A LITTLE
HELP NEEDED FOR BATHING: A LITTLE
TURNING FROM BACK TO SIDE WHILE IN FLAT BAD: A LITTLE
CLIMB 3 TO 5 STEPS WITH RAILING: A LITTLE
DAILY ACTIVITIY SCORE: 21
WALKING IN HOSPITAL ROOM: A LITTLE
MOVING FROM LYING ON BACK TO SITTING ON SIDE OF FLAT BED WITH BEDRAILS: A LITTLE
DRESSING REGULAR LOWER BODY CLOTHING: A LITTLE
MOVING TO AND FROM BED TO CHAIR: A LITTLE
WALKING IN HOSPITAL ROOM: A LITTLE
DRESSING REGULAR LOWER BODY CLOTHING: A LITTLE
MOVING FROM LYING ON BACK TO SITTING ON SIDE OF FLAT BED WITH BEDRAILS: A LITTLE
TURNING FROM BACK TO SIDE WHILE IN FLAT BAD: A LITTLE
CLIMB 3 TO 5 STEPS WITH RAILING: A LITTLE
STANDING UP FROM CHAIR USING ARMS: A LITTLE
MOVING TO AND FROM BED TO CHAIR: A LITTLE
MOBILITY SCORE: 18
DAILY ACTIVITIY SCORE: 21
MOBILITY SCORE: 18
HELP NEEDED FOR BATHING: A LITTLE
DRESSING REGULAR UPPER BODY CLOTHING: A LITTLE
DRESSING REGULAR UPPER BODY CLOTHING: A LITTLE

## 2024-06-03 ASSESSMENT — PAIN DESCRIPTION - LOCATION
LOCATION: RIB CAGE
LOCATION: BACK

## 2024-06-03 ASSESSMENT — PAIN DESCRIPTION - PROGRESSION
CLINICAL_PROGRESSION: GRADUALLY WORSENING
CLINICAL_PROGRESSION: NOT CHANGED
CLINICAL_PROGRESSION: GRADUALLY WORSENING
CLINICAL_PROGRESSION: GRADUALLY IMPROVING
CLINICAL_PROGRESSION: NOT CHANGED
CLINICAL_PROGRESSION: NOT CHANGED

## 2024-06-03 ASSESSMENT — PAIN - FUNCTIONAL ASSESSMENT
PAIN_FUNCTIONAL_ASSESSMENT: 0-10

## 2024-06-03 ASSESSMENT — PAIN DESCRIPTION - ORIENTATION
ORIENTATION: RIGHT

## 2024-06-03 ASSESSMENT — PAIN DESCRIPTION - DESCRIPTORS
DESCRIPTORS: SHARP

## 2024-06-03 ASSESSMENT — PATIENT HEALTH QUESTIONNAIRE - PHQ9
1. LITTLE INTEREST OR PLEASURE IN DOING THINGS: NOT AT ALL
SUM OF ALL RESPONSES TO PHQ9 QUESTIONS 1 & 2: 0
2. FEELING DOWN, DEPRESSED OR HOPELESS: NOT AT ALL

## 2024-06-03 ASSESSMENT — LIFESTYLE VARIABLES
AUDIT-C TOTAL SCORE: 0
HOW OFTEN DO YOU HAVE 6 OR MORE DRINKS ON ONE OCCASION: NEVER
AUDIT-C TOTAL SCORE: 0
HOW OFTEN DO YOU HAVE A DRINK CONTAINING ALCOHOL: NEVER
HOW MANY STANDARD DRINKS CONTAINING ALCOHOL DO YOU HAVE ON A TYPICAL DAY: PATIENT DOES NOT DRINK
SKIP TO QUESTIONS 9-10: 1

## 2024-06-03 ASSESSMENT — PAIN DESCRIPTION - PAIN TYPE
TYPE: ACUTE PAIN

## 2024-06-03 ASSESSMENT — ACTIVITIES OF DAILY LIVING (ADL)
BATHING: INDEPENDENT
ADEQUATE_TO_COMPLETE_ADL: YES
TOILETING: INDEPENDENT
GROOMING: INDEPENDENT
WALKS IN HOME: INDEPENDENT
DRESSING YOURSELF: INDEPENDENT
JUDGMENT_ADEQUATE_SAFELY_COMPLETE_DAILY_ACTIVITIES: YES
LACK_OF_TRANSPORTATION: NO
FEEDING YOURSELF: INDEPENDENT
LACK_OF_TRANSPORTATION: YES
HEARING - LEFT EAR: FUNCTIONAL
PATIENT'S MEMORY ADEQUATE TO SAFELY COMPLETE DAILY ACTIVITIES?: YES
HEARING - RIGHT EAR: FUNCTIONAL

## 2024-06-03 ASSESSMENT — PAIN SCALES - GENERAL
PAINLEVEL_OUTOF10: 7
PAINLEVEL_OUTOF10: 9
PAINLEVEL_OUTOF10: 10 - WORST POSSIBLE PAIN
PAINLEVEL_OUTOF10: 9
PAINLEVEL_OUTOF10: 9
PAINLEVEL_OUTOF10: 7
PAINLEVEL_OUTOF10: 9
PAINLEVEL_OUTOF10: 9
PAINLEVEL_OUTOF10: 8
PAINLEVEL_OUTOF10: 8

## 2024-06-03 ASSESSMENT — ENCOUNTER SYMPTOMS: BACK PAIN: 1

## 2024-06-03 ASSESSMENT — SOCIAL DETERMINANTS OF HEALTH (SDOH): IN THE PAST 12 MONTHS, HAS THE ELECTRIC, GAS, OIL, OR WATER COMPANY THREATENED TO SHUT OFF SERVICE IN YOUR HOME?: NO

## 2024-06-03 NOTE — CARE PLAN
The patient's goals for the shift include      The clinical goals for the shift include remain free from injury by end of shift

## 2024-06-03 NOTE — H&P
Trauma Surgery History and Physical  Patient: Walker Cuenca IV  Unit/Bed: 10/[facility]  YOB: 1970  MRN: [id number]  Acct: [id number]   Admitting Diagnosis: Closed fracture of multiple ribs of right side, initial encounter [S22.41XA]  Date:  6/2/2024  Hospital Day: 0  Attending: Mohan Aparicio MD;Ok Monaco       Complaint:  Chief Complaint   Patient presents with    Fall     Pt states he fell and thinks he may have broken some ribs on Saturday night. Denies head injury, Denies LOC. Patient is a recent liver transplant patient        History of Present Illness:  53-year-old male who presented to [place] ED with complaints of right-sided chest wall and back pain.  He describes having a mechanical fall on Saturday.  He denies striking his head or loss of consciousness.  Patient states he was reaching for the door and tripped over a weight falling backwards and hitting his right side on the corner of a coffee table.  Since that time he has been having worsening chest pain with inspiration and expiration.  He rates his pain at its worst is a 10/10 and sharp.  He denies being on anticoagulation and states that he only takes a baby aspirin daily.    In the ED, CT AP showed acute nondisplaced fractures of the lateral right seventh rib, posterolateral eighth and ninth ribs, and posterior right 10th rib, and although exam without contrast was performed concern for grade 1 liver laceration given location immediately adjacent to the rib fractures and chest wall contusion is possible.  No acute subcapsular hematoma or perihepatic acute hemorrhage.  CT of the thoracic and lumbar spine showed no acute fracture or malalignment.  Lab work in the ED showed glucose of 311, BUN 57, creatinine 2.39, GFR 31, normal LFTs, hemoglobin 11.1, hematocrit 31.4.  Trauma surgery was consulted for further workup and management.      Allergies:  No Known Allergies     PMHx:  Past Medical History:   Diagnosis Date    Diabetes mellitus, type  II (Multi)     GERD (gastroesophageal reflux disease)     Hypothyroid     Liver transplant status (Multi)        PSHx:  Past Surgical History:   Procedure Laterality Date    CORONARY STENT PLACEMENT      LIVER TRANSPLANTATION  10/20/2023    MENISCECTOMY      OTHER SURGICAL HISTORY  12/29/2020    Arthroscopy    OTHER SURGICAL HISTORY  12/29/2020    Complete colonoscopy    OTHER SURGICAL HISTORY  01/18/2022    Inguinal hernia repair    OTHER SURGICAL HISTORY  11/16/2021    Liver biopsy    OTHER SURGICAL HISTORY  12/07/2021    Meniscus repair    OTHER SURGICAL HISTORY  12/16/2021    Cardiac catheterization with stent placement    US GUIDED ABDOMINAL PARACENTESIS  05/10/2023    US GUIDED ABDOMINAL PARACENTESIS 5/10/2023 [facility] US    US GUIDED ABDOMINAL PARACENTESIS  05/26/2023    US GUIDED ABDOMINAL PARACENTESIS 5/26/2023 [facility] US    US GUIDED ABDOMINAL PARACENTESIS  07/13/2023    US GUIDED ABDOMINAL PARACENTESIS 7/13/2023 [facility] US    US GUIDED ABDOMINAL PARACENTESIS  07/21/2023    US GUIDED ABDOMINAL PARACENTESIS 7/21/2023 [facility] US    US GUIDED ABDOMINAL PARACENTESIS  07/31/2023    US GUIDED ABDOMINAL PARACENTESIS 7/31/2023 [facility] US    US GUIDED ABDOMINAL PARACENTESIS  08/08/2023    US GUIDED ABDOMINAL PARACENTESIS 8/8/2023 [facility] US    US GUIDED ABDOMINAL PARACENTESIS  08/14/2023    US GUIDED ABDOMINAL PARACENTESIS 8/14/2023 [facility] US    US GUIDED ABDOMINAL PARACENTESIS  08/22/2023    US GUIDED ABDOMINAL PARACENTESIS 8/22/2023 [facility] US    US GUIDED ABDOMINAL PARACENTESIS  09/05/2023    US GUIDED ABDOMINAL PARACENTESIS 9/5/2023 [facility] US    US GUIDED ABDOMINAL PARACENTESIS  09/12/2023    US GUIDED ABDOMINAL PARACENTESIS 9/12/2023 [facility] US    US GUIDED ABDOMINAL PARACENTESIS  09/15/2023    US GUIDED ABDOMINAL PARACENTESIS 9/15/2023 [facility] US    US GUIDED ABDOMINAL PARACENTESIS  09/19/2023    US GUIDED ABDOMINAL PARACENTESIS 9/19/2023 [facility] US    US GUIDED ABDOMINAL PARACENTESIS  09/26/2023    US GUIDED ABDOMINAL PARACENTESIS 9/26/2023 [facility] US    US  GUIDED ABDOMINAL PARACENTESIS  10/10/2023     GUIDED ABDOMINAL PARACENTESIS 10/10/2023 MD SCOTT Lucas        Social Hx:  Social History     Socioeconomic History    Marital status: Single     Spouse name: None    Number of children: None    Years of education: None    Highest education level: None   Occupational History    None   Tobacco Use    Smoking status: Former     Current packs/day: 0.00     Types: Cigarettes     Quit date: 2010     Years since quittin.4    Smokeless tobacco: Never   Vaping Use    Vaping status: Never Used   Substance and Sexual Activity    Alcohol use: Not Currently    Drug use: Never    Sexual activity: Defer   Other Topics Concern    None   Social History Narrative    None     Social Determinants of Health     Financial Resource Strain: Low Risk  (2022)    Received from [facility]    Overall Financial Resource Strain (CARDIA)     Difficulty of Paying Living Expenses: Not hard at all   Food Insecurity: No Food Insecurity (3/4/2024)    Received from [facility]    Hunger Vital Sign     Worried About Running Out of Food in the Last Year: Never true     Ran Out of Food in the Last Year: Never true   Transportation Needs: No Transportation Needs (3/4/2024)    Received from [facility]    PRAPARE - Transportation     Lack of Transportation (Medical): No     Lack of Transportation (Non-Medical): No   Physical Activity: Not on file   Stress: Not on file   Social Connections: Not on file   Intimate Partner Violence: Not on file   Housing Stability: Low Risk  (3/4/2024)    Received from [facility]    Housing Stability Vital Sign     Unable to Pay for Housing in the Last Year: No     Number of Places Lived in the Last Year: 1     Unstable Housing in the Last Year: No       Family Hx:  No family history on file.    Review of Systems:   Review of Systems   Musculoskeletal:  Positive for back pain.        Right posterior and lateral rib pain    All other systems reviewed and are negative.      Physical Examination:    Visit Vitals  /82 (BP Location: Left arm, Patient Position: Lying)   Pulse 78   Temp 36.7 °C (98.1 °F) (Temporal)   Resp 18      Physical Exam  Vitals reviewed.   Constitutional:       Appearance: Normal appearance.   HENT:      Head: Normocephalic and atraumatic.      Nose: Nose normal.      Mouth/Throat:      Mouth: Mucous membranes are moist.   Cardiovascular:      Rate and Rhythm: Normal rate and regular rhythm.   Pulmonary:      Effort: Pulmonary effort is normal.      Breath sounds: Examination of the right-lower field reveals decreased breath sounds. Examination of the left-lower field reveals decreased breath sounds. Decreased breath sounds present.      Comments: Difficulty with inspiration due to pain  Abdominal:      General: Bowel sounds are normal. There is no distension.      Palpations: Abdomen is soft.      Tenderness: There is no abdominal tenderness. There is no guarding or rebound.   Musculoskeletal:         General: Tenderness present. Normal range of motion.      Comments: Endorses tenderness to palpation of the right posterolateral chest wall.   Skin:     General: Skin is warm and dry.      Capillary Refill: Capillary refill takes less than 2 seconds.   Neurological:      Mental Status: He is alert and oriented to person, place, and time.   Psychiatric:         Mood and Affect: Mood normal.         Behavior: Behavior normal.       LABS:  CBC:   Lab Results   Component Value Date    WBC 6.7 06/03/2024    RBC 3.84 (L) 06/03/2024    HGB 11.1 (L) 06/03/2024    HCT 31.4 (L) 06/03/2024    MCV 82 06/03/2024    MCH 28.9 06/03/2024    MCHC 35.4 06/03/2024    RDW 13.3 06/03/2024     (L) 06/03/2024     CBC with Differential:    Lab Results   Component Value Date    WBC 6.7 06/03/2024    RBC 3.84 (L) 06/03/2024    HGB 11.1 (L) 06/03/2024    HCT 31.4 (L) 06/03/2024     (L) 06/03/2024    MCV 82 06/03/2024    MCH  "28.9 06/03/2024    MCHC 35.4 06/03/2024    RDW 13.3 06/03/2024    NRBC 0.0 06/03/2024    LYMPHOPCT 34.0 06/03/2024    MONOPCT 7.5 06/03/2024    EOSPCT 1.5 06/03/2024    BASOPCT 0.3 06/03/2024    MONOSABS 0.50 06/03/2024    LYMPHSABS 2.28 06/03/2024    EOSABS 0.10 06/03/2024    BASOSABS 0.02 06/03/2024     CMP:    Lab Results   Component Value Date     (L) 06/03/2024    K 4.6 06/03/2024     06/03/2024    CO2 16 (L) 06/03/2024    BUN 57 (H) 06/03/2024    CREATININE 2.39 (H) 06/03/2024    GLUCOSE 311 (H) 06/03/2024    PROT 6.8 06/03/2024    CALCIUM 8.9 06/03/2024    BILITOT 0.8 06/03/2024    ALKPHOS 118 06/03/2024    AST 12 06/03/2024    ALT 16 06/03/2024     BMP:    Lab Results   Component Value Date     (L) 06/03/2024    K 4.6 06/03/2024     06/03/2024    CO2 16 (L) 06/03/2024    BUN 57 (H) 06/03/2024    CREATININE 2.39 (H) 06/03/2024    CALCIUM 8.9 06/03/2024    GLUCOSE 311 (H) 06/03/2024     Magnesium:No results found for: \"MG\"  Troponin:  No results found for: \"TROPHS\"  Lipid Panel:  No results found for: \"HDL\", \"CHHDL\", \"VLDL\", \"TRIG\", \"NHDL\"     Current Medications:    Current Facility-Administered Medications:     lidocaine 4 % patch 2 patch, 2 patch, transdermal, Once, Mohan Aparicio MD, 2 patch at 06/03/24 0006    Current Outpatient Medications:     albuterol 90 mcg/actuation inhaler, USE 1-2 PUFFS EVERY 4 HOURS AS NEEDED, Disp: , Rfl:     aspirin 81 mg chewable tablet, Chew 1 tablet (81 mg) once daily., Disp: , Rfl:     atorvastatin (Lipitor) 20 mg tablet, Take 1 tablet (20 mg) by mouth once daily., Disp: 90 tablet, Rfl: 1    carvedilol (Coreg) 25 mg tablet, Take 1 tablet (25 mg) by mouth 2 times a day with meals., Disp: 90 tablet, Rfl: 3    fludrocortisone (Florinef) 0.1 mg tablet, Take 1 tablet (0.1 mg) by mouth once daily., Disp: , Rfl:     HumuLIN N NPH Insulin KwikPen 100 unit/mL (3 mL) injection, Inject 12 Units under the skin., Disp: , Rfl:     levothyroxine (Synthroid, Levoxyl) " 50 mcg tablet, Take 1 tablet (50 mcg) by mouth once daily., Disp: 90 tablet, Rfl: 1    Nitrostat 0.4 mg SL tablet, Place under the tongue., Disp: , Rfl:     OneTouch Ultra Test strip, 1 strip 2 times a day., Disp: 200 strip, Rfl: 3    pantoprazole (ProtoNix) 40 mg EC tablet, Take 1 tablet (40 mg) by mouth once daily in the morning. Take before meals., Disp: 90 tablet, Rfl: 1    tacrolimus (Prograf) 1 mg capsule, Take 5 capsules (5 mg) by mouth twice a day., Disp: , Rfl:     Veltassa 8.4 gram powder in packet, Take 8.4 g by mouth once daily., Disp: , Rfl:     CT thoracic spine wo IV contrast    Result Date: 6/3/2024  Interpreted By:  Reza Rodriguez, STUDY: CT CHEST ABDOMEN PELVIS WO CONTRAST; CT LUMBAR SPINE WO IV CONTRAST; CT THORACIC SPINE WO IV CONTRAST;  6/2/2024 11:45 pm   INDICATION: Signs/Symptoms:right lateral chest wall injury. s/p liver transplant. eval for blunt liver/renal lac; Signs/Symptoms:fall.   COMPARISON: 08/08/2023 CT abdomen/pelvis without contrast   ACCESSION NUMBER(S): [id number]; [id number]; [id number]   ORDERING CLINICIAN: LULU KELLY   TECHNIQUE: Contiguous axial images of the chest, abdomen, and pelvis were obtained without iodinated contrast. Coronal and sagittal reformatted images were reconstructed from the axial data.   Multiplanar reformatted images of the thoracic and lumbar spine were reconstructed from source data of concurrent CT chest/abdomen/pelvis acquisition.   FINDINGS: CT CHEST:   MEDIASTINUM AND LYMPH NODES:  The esophagus appears within normal limits.  No enlarged intrathoracic or axillary lymph nodes by imaging criteria. No pneumomediastinum.   VESSELS:  Normal caliber thoracic aorta. No evidence of acute intramural hematoma.   HEART: Mildly enlarged.  Severe coronary artery calcifications. No significant pericardial effusion.   LUNG, AIRWAYS, PLEURA: There is mild atelectasis scattered throughout all lobes of both lungs, most pronounced in the right middle lobe  and right lower lobe. There is a trace right pleural effusion. No pneumothorax. No evidence of pulmonary contusion or laceration.   CHEST WALL SOFT TISSUES: Soft tissue stranding along the posterolateral right chest wall.   OSSEOUS STRUCTURES: There is an acute nondisplaced fracture of the lateral right 7th rib, posterolateral right 8th rib, posterolateral right 9th rib, and posterior right 10th rib. Most of these are best visualized on the sagittal images.     CT ABDOMEN/PELVIS:   ABDOMINAL WALL: No acute abnormality. Upper abdominal wall scar noted.   LIVER: Postsurgical changes of orthotopic liver transplant are noted. There is no evidence of an acute subcapsular hematoma or peritransplant hemorrhage. Within the subcapsular aspect of segment 6 there is a 0.9 cm area of parenchymal hypodensity at the level of the 9th-10th ribs.   BILE DUCTS: Common duct stent noted extending into the duodenum. No biliary ductal dilatation.   GALLBLADDER: Surgically absent.   SPLEEN: Enlarged measuring 14.8 cm in craniocaudal length. No evidence of sizable parenchymal laceration or acute subcapsular hematoma.   PANCREAS: No significant abnormality.   ADRENALS: No acute abnormality.   KIDNEYS, URETERS, BLADDER: There is a 0.2 cm left renal calculus. There is no right renal calculus. There is no hydronephrosis. There is no evidence of an acute subcapsular hematoma or perinephric hemorrhage. The urinary bladder wall thickness appears within normal limits for degree of distention.   REPRODUCTIVE ORGANS: No significant abnormality.   VESSELS: Mild aortic bi-iliac atherosclerosis without evidence of abdominal aortic aneurysm or para-aortic hematoma.   LYMPH NODES/RETROPERITONEUM: No acute retroperitoneal abnormality. No enlarged lymph nodes.   BOWEL/MESENTERY/PERITONEUM: There is colonic diverticulosis without evidence for diverticulitis.  No bowel wall thickening or dilatation. Normal appendix. No ascites, free air or fluid collection.    MUSCULOSKELETAL: No acute osseous abnormality.     CT THORACIC SPINE:   PARASPINAL SOFT TISSUES: No paravertebral fluid collection or significant edema. ALIGNMENT:  No traumatic spondylolisthesis or traumatic facet widening. VERTEBRAE:  No acute fracture. Vertebral body heights are maintained. SPINAL CANAL/INTERVERTEBRAL DISCS: No high-grade spinal canal stenosis. Minor multilevel disc spur complexes, for example at T7-T8, T8-T9, T9-T10.     CT LUMBAR SPINE:   PARASPINAL SOFT TISSUES: No paravertebral fluid collection or significant edema. ALIGNMENT:  No traumatic spondylolisthesis or traumatic facet widening. VERTEBRAE:  No acute fracture.  Vertebral body heights are maintained. SPINAL CANAL/INTERVERTEBRAL DISCS: No high-grade spinal canal stenosis. No significant disc height loss. Minor central disc spur complex at L5-S1 and a small intraforaminal disc spur complex at L4-L5. NEURAL FORAMINA: No high-grade foraminal stenosis. There is mild bilateral L4-L5 foraminal stenosis.       CT CHEST/ABDOMEN/PELVIS: 1. Acute nondisplaced fracture of the lateral right 7th rib, posterolateral right 8th rib, posterolateral right 9th rib, and posterior right 10th rib 2. Orthotopic liver transplant with a focal subcapsular linear hypoattenuating area in segment 6 between the 9th and 10th ribs (fracture) and with overlying chest wall edema. Although a unable to be completely characterized without contrast, this is concerning for grade 1 laceration given its location immediately adjacent to the rib fractures and chest wall contusion. No acute subcapsular hematoma or perihepatic acute hemorrhage. 3. Splenomegaly reflecting prior history of portal hypertension, improved from prior study. 4. Colonic diverticulosis without acute diverticulitis.   CT THORACIC AND LUMBAR SPINE: 1. No acute fracture or traumatic malalignment. 2. Degenerative changes of the thoracic and lumbar spine without high-grade canal stenosis.   MACRO: None.    Signed by: Reza Rodriguez 6/3/2024 1:02 AM Dictation workstation:   [id number]    CT lumbar spine wo IV contrast    Result Date: 6/3/2024  Interpreted By:  Reza Rodriguez, STUDY: CT CHEST ABDOMEN PELVIS WO CONTRAST; CT LUMBAR SPINE WO IV CONTRAST; CT THORACIC SPINE WO IV CONTRAST;  6/2/2024 11:45 pm   INDICATION: Signs/Symptoms:right lateral chest wall injury. s/p liver transplant. eval for blunt liver/renal lac; Signs/Symptoms:fall.   COMPARISON: 08/08/2023 CT abdomen/pelvis without contrast   ACCESSION NUMBER(S): [id number]; [id number]; [id number]   ORDERING CLINICIAN: LULU KELLY   TECHNIQUE: Contiguous axial images of the chest, abdomen, and pelvis were obtained without iodinated contrast. Coronal and sagittal reformatted images were reconstructed from the axial data.   Multiplanar reformatted images of the thoracic and lumbar spine were reconstructed from source data of concurrent CT chest/abdomen/pelvis acquisition.   FINDINGS: CT CHEST:   MEDIASTINUM AND LYMPH NODES:  The esophagus appears within normal limits.  No enlarged intrathoracic or axillary lymph nodes by imaging criteria. No pneumomediastinum.   VESSELS:  Normal caliber thoracic aorta. No evidence of acute intramural hematoma.   HEART: Mildly enlarged.  Severe coronary artery calcifications. No significant pericardial effusion.   LUNG, AIRWAYS, PLEURA: There is mild atelectasis scattered throughout all lobes of both lungs, most pronounced in the right middle lobe and right lower lobe. There is a trace right pleural effusion. No pneumothorax. No evidence of pulmonary contusion or laceration.   CHEST WALL SOFT TISSUES: Soft tissue stranding along the posterolateral right chest wall.   OSSEOUS STRUCTURES: There is an acute nondisplaced fracture of the lateral right 7th rib, posterolateral right 8th rib, posterolateral right 9th rib, and posterior right 10th rib. Most of these are best visualized on the sagittal images.     CT  ABDOMEN/PELVIS:   ABDOMINAL WALL: No acute abnormality. Upper abdominal wall scar noted.   LIVER: Postsurgical changes of orthotopic liver transplant are noted. There is no evidence of an acute subcapsular hematoma or peritransplant hemorrhage. Within the subcapsular aspect of segment 6 there is a 0.9 cm area of parenchymal hypodensity at the level of the 9th-10th ribs.   BILE DUCTS: Common duct stent noted extending into the duodenum. No biliary ductal dilatation.   GALLBLADDER: Surgically absent.   SPLEEN: Enlarged measuring 14.8 cm in craniocaudal length. No evidence of sizable parenchymal laceration or acute subcapsular hematoma.   PANCREAS: No significant abnormality.   ADRENALS: No acute abnormality.   KIDNEYS, URETERS, BLADDER: There is a 0.2 cm left renal calculus. There is no right renal calculus. There is no hydronephrosis. There is no evidence of an acute subcapsular hematoma or perinephric hemorrhage. The urinary bladder wall thickness appears within normal limits for degree of distention.   REPRODUCTIVE ORGANS: No significant abnormality.   VESSELS: Mild aortic bi-iliac atherosclerosis without evidence of abdominal aortic aneurysm or para-aortic hematoma.   LYMPH NODES/RETROPERITONEUM: No acute retroperitoneal abnormality. No enlarged lymph nodes.   BOWEL/MESENTERY/PERITONEUM: There is colonic diverticulosis without evidence for diverticulitis.  No bowel wall thickening or dilatation. Normal appendix. No ascites, free air or fluid collection.   MUSCULOSKELETAL: No acute osseous abnormality.     CT THORACIC SPINE:   PARASPINAL SOFT TISSUES: No paravertebral fluid collection or significant edema. ALIGNMENT:  No traumatic spondylolisthesis or traumatic facet widening. VERTEBRAE:  No acute fracture. Vertebral body heights are maintained. SPINAL CANAL/INTERVERTEBRAL DISCS: No high-grade spinal canal stenosis. Minor multilevel disc spur complexes, for example at T7-T8, T8-T9, T9-T10.     CT LUMBAR SPINE:    PARASPINAL SOFT TISSUES: No paravertebral fluid collection or significant edema. ALIGNMENT:  No traumatic spondylolisthesis or traumatic facet widening. VERTEBRAE:  No acute fracture.  Vertebral body heights are maintained. SPINAL CANAL/INTERVERTEBRAL DISCS: No high-grade spinal canal stenosis. No significant disc height loss. Minor central disc spur complex at L5-S1 and a small intraforaminal disc spur complex at L4-L5. NEURAL FORAMINA: No high-grade foraminal stenosis. There is mild bilateral L4-L5 foraminal stenosis.       CT CHEST/ABDOMEN/PELVIS: 1. Acute nondisplaced fracture of the lateral right 7th rib, posterolateral right 8th rib, posterolateral right 9th rib, and posterior right 10th rib 2. Orthotopic liver transplant with a focal subcapsular linear hypoattenuating area in segment 6 between the 9th and 10th ribs (fracture) and with overlying chest wall edema. Although a unable to be completely characterized without contrast, this is concerning for grade 1 laceration given its location immediately adjacent to the rib fractures and chest wall contusion. No acute subcapsular hematoma or perihepatic acute hemorrhage. 3. Splenomegaly reflecting prior history of portal hypertension, improved from prior study. 4. Colonic diverticulosis without acute diverticulitis.   CT THORACIC AND LUMBAR SPINE: 1. No acute fracture or traumatic malalignment. 2. Degenerative changes of the thoracic and lumbar spine without high-grade canal stenosis.   MACRO: None.   Signed by: Reza Rodriguez 6/3/2024 1:02 AM Dictation workstation:   [id number]    CT chest abdomen pelvis wo IV contrast    Result Date: 6/3/2024  Interpreted By:  Reza Rodriguez, STUDY: CT CHEST ABDOMEN PELVIS WO CONTRAST; CT LUMBAR SPINE WO IV CONTRAST; CT THORACIC SPINE WO IV CONTRAST;  6/2/2024 11:45 pm   INDICATION: Signs/Symptoms:right lateral chest wall injury. s/p liver transplant. eval for blunt liver/renal lac; Signs/Symptoms:fall.   COMPARISON:  08/08/2023 CT abdomen/pelvis without contrast   ACCESSION NUMBER(S): [id number]; [id number]; [id number]   ORDERING CLINICIAN: LULU KELLY   TECHNIQUE: Contiguous axial images of the chest, abdomen, and pelvis were obtained without iodinated contrast. Coronal and sagittal reformatted images were reconstructed from the axial data.   Multiplanar reformatted images of the thoracic and lumbar spine were reconstructed from source data of concurrent CT chest/abdomen/pelvis acquisition.   FINDINGS: CT CHEST:   MEDIASTINUM AND LYMPH NODES:  The esophagus appears within normal limits.  No enlarged intrathoracic or axillary lymph nodes by imaging criteria. No pneumomediastinum.   VESSELS:  Normal caliber thoracic aorta. No evidence of acute intramural hematoma.   HEART: Mildly enlarged.  Severe coronary artery calcifications. No significant pericardial effusion.   LUNG, AIRWAYS, PLEURA: There is mild atelectasis scattered throughout all lobes of both lungs, most pronounced in the right middle lobe and right lower lobe. There is a trace right pleural effusion. No pneumothorax. No evidence of pulmonary contusion or laceration.   CHEST WALL SOFT TISSUES: Soft tissue stranding along the posterolateral right chest wall.   OSSEOUS STRUCTURES: There is an acute nondisplaced fracture of the lateral right 7th rib, posterolateral right 8th rib, posterolateral right 9th rib, and posterior right 10th rib. Most of these are best visualized on the sagittal images.     CT ABDOMEN/PELVIS:   ABDOMINAL WALL: No acute abnormality. Upper abdominal wall scar noted.   LIVER: Postsurgical changes of orthotopic liver transplant are noted. There is no evidence of an acute subcapsular hematoma or peritransplant hemorrhage. Within the subcapsular aspect of segment 6 there is a 0.9 cm area of parenchymal hypodensity at the level of the 9th-10th ribs.   BILE DUCTS: Common duct stent noted extending into the duodenum. No biliary ductal dilatation.    GALLBLADDER: Surgically absent.   SPLEEN: Enlarged measuring 14.8 cm in craniocaudal length. No evidence of sizable parenchymal laceration or acute subcapsular hematoma.   PANCREAS: No significant abnormality.   ADRENALS: No acute abnormality.   KIDNEYS, URETERS, BLADDER: There is a 0.2 cm left renal calculus. There is no right renal calculus. There is no hydronephrosis. There is no evidence of an acute subcapsular hematoma or perinephric hemorrhage. The urinary bladder wall thickness appears within normal limits for degree of distention.   REPRODUCTIVE ORGANS: No significant abnormality.   VESSELS: Mild aortic bi-iliac atherosclerosis without evidence of abdominal aortic aneurysm or para-aortic hematoma.   LYMPH NODES/RETROPERITONEUM: No acute retroperitoneal abnormality. No enlarged lymph nodes.   BOWEL/MESENTERY/PERITONEUM: There is colonic diverticulosis without evidence for diverticulitis.  No bowel wall thickening or dilatation. Normal appendix. No ascites, free air or fluid collection.   MUSCULOSKELETAL: No acute osseous abnormality.     CT THORACIC SPINE:   PARASPINAL SOFT TISSUES: No paravertebral fluid collection or significant edema. ALIGNMENT:  No traumatic spondylolisthesis or traumatic facet widening. VERTEBRAE:  No acute fracture. Vertebral body heights are maintained. SPINAL CANAL/INTERVERTEBRAL DISCS: No high-grade spinal canal stenosis. Minor multilevel disc spur complexes, for example at T7-T8, T8-T9, T9-T10.     CT LUMBAR SPINE:   PARASPINAL SOFT TISSUES: No paravertebral fluid collection or significant edema. ALIGNMENT:  No traumatic spondylolisthesis or traumatic facet widening. VERTEBRAE:  No acute fracture.  Vertebral body heights are maintained. SPINAL CANAL/INTERVERTEBRAL DISCS: No high-grade spinal canal stenosis. No significant disc height loss. Minor central disc spur complex at L5-S1 and a small intraforaminal disc spur complex at L4-L5. NEURAL FORAMINA: No high-grade foraminal  stenosis. There is mild bilateral L4-L5 foraminal stenosis.       CT CHEST/ABDOMEN/PELVIS: 1. Acute nondisplaced fracture of the lateral right 7th rib, posterolateral right 8th rib, posterolateral right 9th rib, and posterior right 10th rib 2. Orthotopic liver transplant with a focal subcapsular linear hypoattenuating area in segment 6 between the 9th and 10th ribs (fracture) and with overlying chest wall edema. Although a unable to be completely characterized without contrast, this is concerning for grade 1 laceration given its location immediately adjacent to the rib fractures and chest wall contusion. No acute subcapsular hematoma or perihepatic acute hemorrhage. 3. Splenomegaly reflecting prior history of portal hypertension, improved from prior study. 4. Colonic diverticulosis without acute diverticulitis.   CT THORACIC AND LUMBAR SPINE: 1. No acute fracture or traumatic malalignment. 2. Degenerative changes of the thoracic and lumbar spine without high-grade canal stenosis.   MACRO: None.   Signed by: Reza Rodriguez 6/3/2024 1:02 AM Dictation workstation:   [id number]       No results found for this or any previous visit from the past 1095 days.     Assessment:    53-year-old male who presented to [facility] after a fall on Saturday where he struck his right side on the corner of a coffee table.  CT CAP shows acute nondisplaced fracture of lateral right seventh rib, posterolateral right eighth rib, posterolateral ninth rib, and posterior right 10th rib as well as grade 1 liver laceration without subcapsular hematoma or perihepatic acute hemorrhage-exam was limited due to lack of IV contrast.  Labs in the ED showed glucose of 311, creatinine 2.39 with GFR of 31, normal LFTs, hemoglobin 11.1, hematocrit 31.4.    Primary medical history is significant for liver transplant in 10/2023 for which he takes Tacrolimus, DM 2 on insulin, GERD, hypothyroidism.  Patient is a former smoker, no longer drinks alcohol as of  2018, denies drug use.  Patient will be admitted under trauma service for further evaluation and management of his grade 1 liver laceration and rib fractures.    Past Medical History:   Diagnosis Date    Diabetes mellitus, type II (Multi)     GERD (gastroesophageal reflux disease)     Hypothyroid     Liver transplant status (Multi)        Plan:    Grade 1 liver laceration  - Strict bedrest for 24 hours  - N.p.o. for 24 hours  - D5 LR at 100 mL/h  - CBC every 6 hours x 24 hours  - Type and screen  - Hold anticoagulants including home baby aspirin and DVT prophylaxis for 48 hours    LETITIA-baseline creatinine 1.81-5/25/2024  -Tacrolimus level  - Limit nephrotoxic agents  - Fluid resuscitation     Rib fractures  - Pain control  - Incentive spirometry 10 times per hour with goal beginning at 1000 mL    S/p liver transplant 10/2023  -Continue tacrolimus and monitor levels  - Notify [facility] liver transplant team of admission    DM 2  - Resume home Humulin N NPH 6 units while n.p.o.  - Accu-Cheks 4 times daily before meals and at bedtime    GERD  - Continue home pantoprazole    Hypothyroidism  - Continue home levothyroxine    DVT prophylaxis  - SCDs and BELKIS hose    Further recommendations per Dr. Cunha    Time spent 51 minutes obtaining labs, imaging, recommendations, interview, assessment, examination, medication review/ordering, and EMR review.    Plan of care was discussed extensively with patient. Patient verbalized understanding through teach back method. All questions and concerns addressed upon examination.     Of note, this documentation is completed using the Dragon Dictation system (voice recognition software). There may be spelling and/or grammatical errors that were not corrected prior to final submission.    Electronically signed by EDWINA Alvarez on 6/3/2024 at 5:18 AM

## 2024-06-03 NOTE — DISCHARGE INSTRUCTIONS
Liver Laceration:  No contact sports or high risk activities for 2-4 months.   Rib fractures:  Continue to use incentive spirometer 10 times every hour while you are awake for at least 2 weeks or until pain is improved.    Follow up with [facility] within 2 weeks.  Do not drive or operate heavy machinery while taking narcotics.    Call the office or go to the ER if:  You have a fever above 100.4  Cannot eat or drink  Have trouble breathing  Have chest pain or shortness of breath  Pain is uncontrolled

## 2024-06-03 NOTE — ED PROVIDER NOTES
HPI   Chief Complaint   Patient presents with    Fall     Pt states he fell and thinks he may have broken some ribs on Saturday night. Denies head injury, Denies LOC. Patient is a recent liver transplant patient       Patient presents to the ER with complaints of right-sided chest wall and back pain.  States he had mechanical fall on Saturday.  He believes he fell on the corner of a coffee table.  He states he is having worsening chest pain with inspiration and expiration.  He has prior history of liver transplant and reports compliance with his antirejection medications.  Has no complaints of any anterior abdominal pain.  Not on anticoagulation.  Denies head injury or loss of consciousness.  No complaints of headache or numbness or tingling or weakness in the bilateral upper and lower extremities.                          [place] Coma Scale Score: 15                     Patient History   Past Medical History:   Diagnosis Date    GERD (gastroesophageal reflux disease)      Past Surgical History:   Procedure Laterality Date    CORONARY STENT PLACEMENT      MENISCECTOMY      OTHER SURGICAL HISTORY  12/29/2020    Arthroscopy    OTHER SURGICAL HISTORY  12/29/2020    Complete colonoscopy    OTHER SURGICAL HISTORY  01/18/2022    Inguinal hernia repair    OTHER SURGICAL HISTORY  11/16/2021    Liver biopsy    OTHER SURGICAL HISTORY  12/07/2021    Meniscus repair    OTHER SURGICAL HISTORY  12/16/2021    Cardiac catheterization with stent placement    US GUIDED ABDOMINAL PARACENTESIS  05/10/2023    US GUIDED ABDOMINAL PARACENTESIS 5/10/2023 [facility]    US GUIDED ABDOMINAL PARACENTESIS  05/26/2023    US GUIDED ABDOMINAL PARACENTESIS 5/26/2023 [facility]    US GUIDED ABDOMINAL PARACENTESIS  07/13/2023    US GUIDED ABDOMINAL PARACENTESIS 7/13/2023 [facility]    US GUIDED ABDOMINAL PARACENTESIS  07/21/2023    US GUIDED ABDOMINAL PARACENTESIS 7/21/2023 [facility]    US GUIDED ABDOMINAL PARACENTESIS  07/31/2023    US GUIDED ABDOMINAL  PARACENTESIS 2023 [facility]    US GUIDED ABDOMINAL PARACENTESIS  2023    US GUIDED ABDOMINAL PARACENTESIS 2023 [facility]    US GUIDED ABDOMINAL PARACENTESIS  2023    US GUIDED ABDOMINAL PARACENTESIS 2023 [facility]    US GUIDED ABDOMINAL PARACENTESIS  2023    US GUIDED ABDOMINAL PARACENTESIS 2023 [facility]    US GUIDED ABDOMINAL PARACENTESIS  2023    US GUIDED ABDOMINAL PARACENTESIS 2023 [facility]    US GUIDED ABDOMINAL PARACENTESIS  2023    US GUIDED ABDOMINAL PARACENTESIS 2023 [facility]    US GUIDED ABDOMINAL PARACENTESIS  09/15/2023    US GUIDED ABDOMINAL PARACENTESIS 9/15/2023 [facility]    US GUIDED ABDOMINAL PARACENTESIS  2023    US GUIDED ABDOMINAL PARACENTESIS 2023 [facility]    US GUIDED ABDOMINAL PARACENTESIS  2023    US GUIDED ABDOMINAL PARACENTESIS 2023 [facility]    US GUIDED ABDOMINAL PARACENTESIS  10/10/2023    US GUIDED ABDOMINAL PARACENTESIS 10/10/2023 Edwin Rivera MD [facility]     No family history on file.  Social History     Tobacco Use    Smoking status: Former     Current packs/day: 0.00     Types: Cigarettes     Quit date: 2010     Years since quittin.4    Smokeless tobacco: Never   Vaping Use    Vaping status: Never Used   Substance Use Topics    Alcohol use: Not Currently    Drug use: Never       Physical Exam   ED Triage Vitals [24 2259]   Temperature Heart Rate Respirations BP   36.7 °C (98.1 °F) 89 18 162/81      Pulse Ox Temp Source Heart Rate Source Patient Position   96 % Temporal Monitor Sitting      BP Location FiO2 (%)     Right arm --       Physical Exam  Constitutional:       General: He is not in acute distress.     Appearance: He is normal weight. He is not toxic-appearing.   HENT:      Head: Normocephalic and atraumatic.      Right Ear: External ear normal.      Left Ear: External ear normal.   Abdominal:      Tenderness: There is abdominal tenderness (ruq/right lateral abdominal tenderness with palpation).    Musculoskeletal:         General: Swelling, tenderness and signs of injury present.      Comments: Ecchymoses noted over the mid right lateral/posterior chest wall segment.  No crepitus with palpation.    No midline C/T/L-spine tenderness or palpable step-offs.   Skin:     Comments: Large right upper quadrant abdominal scar that is healed   Neurological:      Mental Status: He is alert.         ED Course & MDM   Diagnoses as of 06/03/24 0147   Closed fracture of multiple ribs of right side, initial encounter   Fall, initial encounter       Medical Decision Making      From a pain control, pulm toileting and observation point of view, admitted to trauma service  See below for detailed CT results  HDS    Hemoglobin at baseline.  INR 1.0.  Mild thrombocytopenia at baseline.  CKD at baseline.  LFTs unremarkable.  Radiologist believes there may be subtle less than 1 cm subcapsular liver laceration in the region of his rib fractures.  Not requiring any supplemental oxygenation.  Pain initially controlled with morphine and then had return of pain that was subsequently treated with Dilaudid.  Given lidocaine patches as well.    IMPRESSION:  CT CHEST/ABDOMEN/PELVIS:  1. Acute nondisplaced fracture of the lateral right 7th rib,  posterolateral right 8th rib, posterolateral right 9th rib, and  posterior right 10th rib  2. Orthotopic liver transplant with a focal subcapsular linear  hypoattenuating area in segment 6 between the 9th and 10th ribs  (fracture) and with overlying chest wall edema. Although a unable to  be completely characterized without contrast, this is concerning for  grade 1 laceration given its location immediately adjacent to the rib  fractures and chest wall contusion. No acute subcapsular hematoma or  perihepatic acute hemorrhage.  3. Splenomegaly reflecting prior history of portal hypertension,  improved from prior study.  4. Colonic diverticulosis without acute diverticulitis.      CT THORACIC AND  LUMBAR SPINE:  1. No acute fracture or traumatic malalignment.  2. Degenerative changes of the thoracic and lumbar spine without  high-grade canal stenosis.    Procedure  Procedures     Mohan Aparicio MD  06/03/24 0147

## 2024-06-04 LAB
ANION GAP SERPL CALC-SCNC: 11 MMOL/L (ref 10–20)
BUN SERPL-MCNC: 53 MG/DL (ref 6–23)
CALCIUM SERPL-MCNC: 8.7 MG/DL (ref 8.6–10.3)
CHLORIDE SERPL-SCNC: 109 MMOL/L (ref 98–107)
CO2 SERPL-SCNC: 19 MMOL/L (ref 21–32)
CREAT SERPL-MCNC: 2.08 MG/DL (ref 0.5–1.3)
EGFRCR SERPLBLD CKD-EPI 2021: 37 ML/MIN/1.73M*2
ERYTHROCYTE [DISTWIDTH] IN BLOOD BY AUTOMATED COUNT: 13.4 % (ref 11.5–14.5)
GLUCOSE BLD MANUAL STRIP-MCNC: 179 MG/DL (ref 74–99)
GLUCOSE BLD MANUAL STRIP-MCNC: 201 MG/DL (ref 74–99)
GLUCOSE BLD MANUAL STRIP-MCNC: 204 MG/DL (ref 74–99)
GLUCOSE BLD MANUAL STRIP-MCNC: 216 MG/DL (ref 74–99)
GLUCOSE SERPL-MCNC: 209 MG/DL (ref 74–99)
HCT VFR BLD AUTO: 30.6 % (ref 41–52)
HGB BLD-MCNC: 10.6 G/DL (ref 13.5–17.5)
HOLD SPECIMEN: NORMAL
HOLD SPECIMEN: NORMAL
MCH RBC QN AUTO: 28.7 PG (ref 26–34)
MCHC RBC AUTO-ENTMCNC: 34.6 G/DL (ref 32–36)
MCV RBC AUTO: 83 FL (ref 80–100)
NRBC BLD-RTO: 0 /100 WBCS (ref 0–0)
PLATELET # BLD AUTO: 125 X10*3/UL (ref 150–450)
POTASSIUM SERPL-SCNC: 5.1 MMOL/L (ref 3.5–5.3)
RBC # BLD AUTO: 3.69 X10*6/UL (ref 4.5–5.9)
SODIUM SERPL-SCNC: 134 MMOL/L (ref 136–145)
TACROLIMUS BLD-MCNC: 13.5 NG/ML
WBC # BLD AUTO: 5.3 X10*3/UL (ref 4.4–11.3)

## 2024-06-04 PROCEDURE — 2500000004 HC RX 250 GENERAL PHARMACY W/ HCPCS (ALT 636 FOR OP/ED): Performed by: REGISTERED NURSE

## 2024-06-04 PROCEDURE — 36415 COLL VENOUS BLD VENIPUNCTURE: CPT

## 2024-06-04 PROCEDURE — 2500000001 HC RX 250 WO HCPCS SELF ADMINISTERED DRUGS (ALT 637 FOR MEDICARE OP)

## 2024-06-04 PROCEDURE — 2500000004 HC RX 250 GENERAL PHARMACY W/ HCPCS (ALT 636 FOR OP/ED)

## 2024-06-04 PROCEDURE — 2500000005 HC RX 250 GENERAL PHARMACY W/O HCPCS

## 2024-06-04 PROCEDURE — 1210000001 HC SEMI-PRIVATE ROOM DAILY

## 2024-06-04 PROCEDURE — 82947 ASSAY GLUCOSE BLOOD QUANT: CPT

## 2024-06-04 PROCEDURE — 80197 ASSAY OF TACROLIMUS: CPT | Mod: ELYLAB

## 2024-06-04 PROCEDURE — 80048 BASIC METABOLIC PNL TOTAL CA: CPT | Performed by: REGISTERED NURSE

## 2024-06-04 PROCEDURE — 99231 SBSQ HOSP IP/OBS SF/LOW 25: CPT | Performed by: SURGERY

## 2024-06-04 PROCEDURE — 85027 COMPLETE CBC AUTOMATED: CPT

## 2024-06-04 RX ORDER — INSULIN LISPRO 100 [IU]/ML
0-5 INJECTION, SOLUTION INTRAVENOUS; SUBCUTANEOUS
Status: DISCONTINUED | OUTPATIENT
Start: 2024-06-05 | End: 2024-06-05

## 2024-06-04 RX ORDER — SODIUM CHLORIDE 9 MG/ML
100 INJECTION, SOLUTION INTRAVENOUS CONTINUOUS
Status: DISCONTINUED | OUTPATIENT
Start: 2024-06-04 | End: 2024-06-04

## 2024-06-04 RX ADMIN — METHOCARBAMOL TABLETS 500 MG: 500 TABLET, COATED ORAL at 23:03

## 2024-06-04 RX ADMIN — FLUDROCORTISONE ACETATE 0.1 MG: 0.1 TABLET ORAL at 06:11

## 2024-06-04 RX ADMIN — TACROLIMUS 5 MG: 1 CAPSULE ORAL at 06:11

## 2024-06-04 RX ADMIN — METHOCARBAMOL TABLETS 500 MG: 500 TABLET, COATED ORAL at 11:06

## 2024-06-04 RX ADMIN — LIDOCAINE 1 PATCH: 4 PATCH TOPICAL at 08:06

## 2024-06-04 RX ADMIN — OXYCODONE HYDROCHLORIDE 5 MG: 5 TABLET ORAL at 23:03

## 2024-06-04 RX ADMIN — SODIUM CHLORIDE 100 ML/HR: 9 INJECTION, SOLUTION INTRAVENOUS at 12:45

## 2024-06-04 RX ADMIN — CARVEDILOL 25 MG: 12.5 TABLET, FILM COATED ORAL at 17:09

## 2024-06-04 RX ADMIN — ATORVASTATIN CALCIUM 20 MG: 20 TABLET, FILM COATED ORAL at 08:07

## 2024-06-04 RX ADMIN — HYDROMORPHONE HYDROCHLORIDE 0.2 MG: 0.2 INJECTION, SOLUTION INTRAMUSCULAR; INTRAVENOUS; SUBCUTANEOUS at 00:22

## 2024-06-04 RX ADMIN — LEVOTHYROXINE SODIUM 50 MCG: 50 TABLET ORAL at 08:07

## 2024-06-04 RX ADMIN — CARVEDILOL 25 MG: 12.5 TABLET, FILM COATED ORAL at 08:07

## 2024-06-04 RX ADMIN — METHOCARBAMOL TABLETS 500 MG: 500 TABLET, COATED ORAL at 06:11

## 2024-06-04 RX ADMIN — HYDROMORPHONE HYDROCHLORIDE 0.2 MG: 0.2 INJECTION, SOLUTION INTRAMUSCULAR; INTRAVENOUS; SUBCUTANEOUS at 21:59

## 2024-06-04 RX ADMIN — PANTOPRAZOLE SODIUM 40 MG: 40 TABLET, DELAYED RELEASE ORAL at 06:11

## 2024-06-04 RX ADMIN — HYDROMORPHONE HYDROCHLORIDE 0.2 MG: 0.2 INJECTION, SOLUTION INTRAMUSCULAR; INTRAVENOUS; SUBCUTANEOUS at 04:09

## 2024-06-04 RX ADMIN — OXYCODONE HYDROCHLORIDE 5 MG: 5 TABLET ORAL at 11:06

## 2024-06-04 RX ADMIN — ACETAMINOPHEN 1000 MG: 10 INJECTION, SOLUTION INTRAVENOUS at 06:11

## 2024-06-04 RX ADMIN — OXYCODONE HYDROCHLORIDE 5 MG: 5 TABLET ORAL at 17:09

## 2024-06-04 RX ADMIN — INSULIN LISPRO 2 UNITS: 100 INJECTION, SOLUTION INTRAVENOUS; SUBCUTANEOUS at 17:10

## 2024-06-04 RX ADMIN — OXYCODONE HYDROCHLORIDE 5 MG: 5 TABLET ORAL at 06:11

## 2024-06-04 RX ADMIN — TACROLIMUS 5 MG: 1 CAPSULE ORAL at 17:54

## 2024-06-04 RX ADMIN — INSULIN LISPRO 1 UNITS: 100 INJECTION, SOLUTION INTRAVENOUS; SUBCUTANEOUS at 11:19

## 2024-06-04 RX ADMIN — SENNOSIDES 17.2 MG: 8.6 TABLET, FILM COATED ORAL at 08:07

## 2024-06-04 RX ADMIN — METHOCARBAMOL TABLETS 500 MG: 500 TABLET, COATED ORAL at 17:09

## 2024-06-04 ASSESSMENT — COGNITIVE AND FUNCTIONAL STATUS - GENERAL
TURNING FROM BACK TO SIDE WHILE IN FLAT BAD: A LITTLE
WALKING IN HOSPITAL ROOM: A LITTLE
DRESSING REGULAR UPPER BODY CLOTHING: A LITTLE
STANDING UP FROM CHAIR USING ARMS: A LITTLE
CLIMB 3 TO 5 STEPS WITH RAILING: A LITTLE
HELP NEEDED FOR BATHING: A LITTLE
MOVING TO AND FROM BED TO CHAIR: A LITTLE
MOVING FROM LYING ON BACK TO SITTING ON SIDE OF FLAT BED WITH BEDRAILS: A LITTLE
MOBILITY SCORE: 18
DRESSING REGULAR LOWER BODY CLOTHING: A LITTLE
DAILY ACTIVITIY SCORE: 21

## 2024-06-04 ASSESSMENT — PAIN - FUNCTIONAL ASSESSMENT
PAIN_FUNCTIONAL_ASSESSMENT: 0-10

## 2024-06-04 ASSESSMENT — PAIN SCALES - GENERAL
PAINLEVEL_OUTOF10: 9
PAINLEVEL_OUTOF10: 8
PAINLEVEL_OUTOF10: 10 - WORST POSSIBLE PAIN
PAINLEVEL_OUTOF10: 4
PAINLEVEL_OUTOF10: 6
PAINLEVEL_OUTOF10: 10 - WORST POSSIBLE PAIN
PAINLEVEL_OUTOF10: 0 - NO PAIN

## 2024-06-04 ASSESSMENT — PAIN DESCRIPTION - LOCATION
LOCATION: RIB CAGE

## 2024-06-04 ASSESSMENT — PAIN DESCRIPTION - ORIENTATION
ORIENTATION: RIGHT

## 2024-06-04 NOTE — CARE PLAN
The patient's goals for the shift include      The clinical goals for the shift include Pt will remain free from injury throughout this shift

## 2024-06-04 NOTE — PROGRESS NOTES
General Surgery Progress Note    Patient: Walker Cuenca IV  Unit/Bed: 910/910-B  YOB: 1970  MRN: [id number]  Acct: [id number]   Admitting Diagnosis: Fall, initial encounter [W19.XXXA]  Closed fracture of multiple ribs of right side, initial encounter [S22.41XA]  Date:  6/2/2024  Hospital Day: 1  Attending: Ok Cunha MD    Complaint:  Chief Complaint   Patient presents with    Fall     Pt states he fell and thinks he may have broken some ribs on Saturday night. Denies head injury, Denies LOC. Patient is a recent liver transplant patient      Subjective  Patient seen and examined this morning. No acute events overnight.  Patient states he still feels the same as yesterday.  He states he keeps getting an on and off cramping to right-sided chest and his abdomen.    PHYSICAL EXAM:  Physical Exam  Vitals reviewed.   Constitutional:       General: He is not in acute distress.  HENT:      Head: Normocephalic.      Right Ear: External ear normal.      Left Ear: External ear normal.      Nose: Nose normal.      Mouth/Throat:      Mouth: Mucous membranes are dry.   Eyes:      Extraocular Movements: Extraocular movements intact.      Pupils: Pupils are equal, round, and reactive to light.   Cardiovascular:      Rate and Rhythm: Normal rate.   Pulmonary:      Effort: Pulmonary effort is normal.   Chest:      Chest wall: Tenderness (right) present.   Abdominal:      General: Abdomen is flat. Bowel sounds are normal.      Palpations: Abdomen is soft.   Musculoskeletal:         General: Normal range of motion.      Cervical back: Normal range of motion.   Skin:     General: Skin is warm.      Capillary Refill: Capillary refill takes less than 2 seconds.   Neurological:      General: No focal deficit present.      Mental Status: He is alert and oriented to person, place, and time.   Psychiatric:         Mood and Affect: Mood normal.       Vital signs in last 24 hours:  Vitals:    06/04/24 0745   BP: 145/75    Pulse: 69   Resp:    Temp: 36.6 °C (97.9 °F)   SpO2: 95%     Intake/Output this shift:    Intake/Output Summary (Last 24 hours) at 6/4/2024 0803  Last data filed at 6/3/2024 2322  Gross per 24 hour   Intake 1549 ml   Output 500 ml   Net 1049 ml      Allergies:  No Known Allergies     Medications:  Scheduled medications  [Held by provider] aspirin, 81 mg, oral, Daily  atorvastatin, 20 mg, oral, Daily  carvedilol, 25 mg, oral, BID  fludrocortisone, 0.1 mg, oral, Daily  insulin lispro, 0-5 Units, subcutaneous, q4h  insulin NPH (Isophane), 6 Units, subcutaneous, q24h MIRACLE  levothyroxine, 50 mcg, oral, Daily  lidocaine, 1 patch, transdermal, Daily  methocarbamol, 500 mg, oral, q6h MIRACLE  oxyCODONE, 5 mg, oral, q6h  pantoprazole, 40 mg, oral, Daily before breakfast  sennosides, 2 tablet, oral, BID  tacrolimus, 5 mg, oral, q12h MIRACLE      Continuous medications  dextrose 5 % and lactated Ringer's, 100 mL/hr, Last Rate: 100 mL/hr (06/03/24 2224)      PRN medications  PRN medications: dextrose, dextrose, glucagon, glucagon, HYDROmorphone    Labs:  Results for orders placed or performed during the hospital encounter of 06/02/24 (from the past 24 hour(s))   Type and screen   Result Value Ref Range    ABO TYPE O     Rh TYPE POS     ANTIBODY SCREEN NEG    CBC   Result Value Ref Range    WBC 5.9 4.4 - 11.3 x10*3/uL    nRBC 0.0 0.0 - 0.0 /100 WBCs    RBC 4.02 (L) 4.50 - 5.90 x10*6/uL    Hemoglobin 11.6 (L) 13.5 - 17.5 g/dL    Hematocrit 33.4 (L) 41.0 - 52.0 %    MCV 83 80 - 100 fL    MCH 28.9 26.0 - 34.0 pg    MCHC 34.7 32.0 - 36.0 g/dL    RDW 13.6 11.5 - 14.5 %    Platelets 136 (L) 150 - 450 x10*3/uL   Light Blue Top   Result Value Ref Range    Extra Tube Hold for add-ons.    SST TOP   Result Value Ref Range    Extra Tube Hold for add-ons.    PST Top   Result Value Ref Range    Extra Tube Hold for add-ons.    POCT GLUCOSE   Result Value Ref Range    POCT Glucose 261 (H) 74 - 99 mg/dL   CBC   Result Value Ref Range    WBC 5.9 4.4 -  11.3 x10*3/uL    nRBC 0.0 0.0 - 0.0 /100 WBCs    RBC 3.70 (L) 4.50 - 5.90 x10*6/uL    Hemoglobin 10.7 (L) 13.5 - 17.5 g/dL    Hematocrit 30.6 (L) 41.0 - 52.0 %    MCV 83 80 - 100 fL    MCH 28.9 26.0 - 34.0 pg    MCHC 35.0 32.0 - 36.0 g/dL    RDW 13.8 11.5 - 14.5 %    Platelets 121 (L) 150 - 450 x10*3/uL   POCT GLUCOSE   Result Value Ref Range    POCT Glucose 251 (H) 74 - 99 mg/dL   CBC   Result Value Ref Range    WBC 5.1 4.4 - 11.3 x10*3/uL    nRBC 0.0 0.0 - 0.0 /100 WBCs    RBC 3.75 (L) 4.50 - 5.90 x10*6/uL    Hemoglobin 10.8 (L) 13.5 - 17.5 g/dL    Hematocrit 30.9 (L) 41.0 - 52.0 %    MCV 82 80 - 100 fL    MCH 28.8 26.0 - 34.0 pg    MCHC 35.0 32.0 - 36.0 g/dL    RDW 13.6 11.5 - 14.5 %    Platelets 122 (L) 150 - 450 x10*3/uL   POCT GLUCOSE   Result Value Ref Range    POCT Glucose 208 (H) 74 - 99 mg/dL   POCT GLUCOSE   Result Value Ref Range    POCT Glucose 179 (H) 74 - 99 mg/dL   SST TOP   Result Value Ref Range    Extra Tube Hold for add-ons.    PST Top   Result Value Ref Range    Extra Tube Hold for add-ons.    CBC   Result Value Ref Range    WBC 5.3 4.4 - 11.3 x10*3/uL    nRBC 0.0 0.0 - 0.0 /100 WBCs    RBC 3.69 (L) 4.50 - 5.90 x10*6/uL    Hemoglobin 10.6 (L) 13.5 - 17.5 g/dL    Hematocrit 30.6 (L) 41.0 - 52.0 %    MCV 83 80 - 100 fL    MCH 28.7 26.0 - 34.0 pg    MCHC 34.6 32.0 - 36.0 g/dL    RDW 13.4 11.5 - 14.5 %    Platelets 125 (L) 150 - 450 x10*3/uL      Imaging:  CT thoracic spine wo IV contrast    Result Date: 6/3/2024  Interpreted By:  Reza Rodriguez, STUDY: CT CHEST ABDOMEN PELVIS WO CONTRAST; CT LUMBAR SPINE WO IV CONTRAST; CT THORACIC SPINE WO IV CONTRAST;  6/2/2024 11:45 pm   INDICATION: Signs/Symptoms:right lateral chest wall injury. s/p liver transplant. eval for blunt liver/renal lac; Signs/Symptoms:fall.   COMPARISON: 08/08/2023 CT abdomen/pelvis without contrast   ACCESSION NUMBER(S): [id number]; [id number]; [id number]   ORDERING CLINICIAN: LULU KELLY   TECHNIQUE: Contiguous axial  images of the chest, abdomen, and pelvis were obtained without iodinated contrast. Coronal and sagittal reformatted images were reconstructed from the axial data.   Multiplanar reformatted images of the thoracic and lumbar spine were reconstructed from source data of concurrent CT chest/abdomen/pelvis acquisition.   FINDINGS: CT CHEST:   MEDIASTINUM AND LYMPH NODES:  The esophagus appears within normal limits.  No enlarged intrathoracic or axillary lymph nodes by imaging criteria. No pneumomediastinum.   VESSELS:  Normal caliber thoracic aorta. No evidence of acute intramural hematoma.   HEART: Mildly enlarged.  Severe coronary artery calcifications. No significant pericardial effusion.   LUNG, AIRWAYS, PLEURA: There is mild atelectasis scattered throughout all lobes of both lungs, most pronounced in the right middle lobe and right lower lobe. There is a trace right pleural effusion. No pneumothorax. No evidence of pulmonary contusion or laceration.   CHEST WALL SOFT TISSUES: Soft tissue stranding along the posterolateral right chest wall.   OSSEOUS STRUCTURES: There is an acute nondisplaced fracture of the lateral right 7th rib, posterolateral right 8th rib, posterolateral right 9th rib, and posterior right 10th rib. Most of these are best visualized on the sagittal images.     CT ABDOMEN/PELVIS:   ABDOMINAL WALL: No acute abnormality. Upper abdominal wall scar noted.   LIVER: Postsurgical changes of orthotopic liver transplant are noted. There is no evidence of an acute subcapsular hematoma or peritransplant hemorrhage. Within the subcapsular aspect of segment 6 there is a 0.9 cm area of parenchymal hypodensity at the level of the 9th-10th ribs.   BILE DUCTS: Common duct stent noted extending into the duodenum. No biliary ductal dilatation.   GALLBLADDER: Surgically absent.   SPLEEN: Enlarged measuring 14.8 cm in craniocaudal length. No evidence of sizable parenchymal laceration or acute subcapsular hematoma.    PANCREAS: No significant abnormality.   ADRENALS: No acute abnormality.   KIDNEYS, URETERS, BLADDER: There is a 0.2 cm left renal calculus. There is no right renal calculus. There is no hydronephrosis. There is no evidence of an acute subcapsular hematoma or perinephric hemorrhage. The urinary bladder wall thickness appears within normal limits for degree of distention.   REPRODUCTIVE ORGANS: No significant abnormality.   VESSELS: Mild aortic bi-iliac atherosclerosis without evidence of abdominal aortic aneurysm or para-aortic hematoma.   LYMPH NODES/RETROPERITONEUM: No acute retroperitoneal abnormality. No enlarged lymph nodes.   BOWEL/MESENTERY/PERITONEUM: There is colonic diverticulosis without evidence for diverticulitis.  No bowel wall thickening or dilatation. Normal appendix. No ascites, free air or fluid collection.   MUSCULOSKELETAL: No acute osseous abnormality.     CT THORACIC SPINE:   PARASPINAL SOFT TISSUES: No paravertebral fluid collection or significant edema. ALIGNMENT:  No traumatic spondylolisthesis or traumatic facet widening. VERTEBRAE:  No acute fracture. Vertebral body heights are maintained. SPINAL CANAL/INTERVERTEBRAL DISCS: No high-grade spinal canal stenosis. Minor multilevel disc spur complexes, for example at T7-T8, T8-T9, T9-T10.     CT LUMBAR SPINE:   PARASPINAL SOFT TISSUES: No paravertebral fluid collection or significant edema. ALIGNMENT:  No traumatic spondylolisthesis or traumatic facet widening. VERTEBRAE:  No acute fracture.  Vertebral body heights are maintained. SPINAL CANAL/INTERVERTEBRAL DISCS: No high-grade spinal canal stenosis. No significant disc height loss. Minor central disc spur complex at L5-S1 and a small intraforaminal disc spur complex at L4-L5. NEURAL FORAMINA: No high-grade foraminal stenosis. There is mild bilateral L4-L5 foraminal stenosis.       CT CHEST/ABDOMEN/PELVIS: 1. Acute nondisplaced fracture of the lateral right 7th rib, posterolateral right 8th  rib, posterolateral right 9th rib, and posterior right 10th rib 2. Orthotopic liver transplant with a focal subcapsular linear hypoattenuating area in segment 6 between the 9th and 10th ribs (fracture) and with overlying chest wall edema. Although a unable to be completely characterized without contrast, this is concerning for grade 1 laceration given its location immediately adjacent to the rib fractures and chest wall contusion. No acute subcapsular hematoma or perihepatic acute hemorrhage. 3. Splenomegaly reflecting prior history of portal hypertension, improved from prior study. 4. Colonic diverticulosis without acute diverticulitis.   CT THORACIC AND LUMBAR SPINE: 1. No acute fracture or traumatic malalignment. 2. Degenerative changes of the thoracic and lumbar spine without high-grade canal stenosis.   MACRO: None.   Signed by: Reza Rodriguez 6/3/2024 1:02 AM Dictation workstation:   [id number]    CT lumbar spine wo IV contrast    Result Date: 6/3/2024  Interpreted By:  Reza Rodriguez, STUDY: CT CHEST ABDOMEN PELVIS WO CONTRAST; CT LUMBAR SPINE WO IV CONTRAST; CT THORACIC SPINE WO IV CONTRAST;  6/2/2024 11:45 pm   INDICATION: Signs/Symptoms:right lateral chest wall injury. s/p liver transplant. eval for blunt liver/renal lac; Signs/Symptoms:fall.   COMPARISON: 08/08/2023 CT abdomen/pelvis without contrast   ACCESSION NUMBER(S): [id number]; [id number]; [id number]   ORDERING CLINICIAN: LULU KELLY   TECHNIQUE: Contiguous axial images of the chest, abdomen, and pelvis were obtained without iodinated contrast. Coronal and sagittal reformatted images were reconstructed from the axial data.   Multiplanar reformatted images of the thoracic and lumbar spine were reconstructed from source data of concurrent CT chest/abdomen/pelvis acquisition.   FINDINGS: CT CHEST:   MEDIASTINUM AND LYMPH NODES:  The esophagus appears within normal limits.  No enlarged intrathoracic or axillary lymph nodes by imaging  criteria. No pneumomediastinum.   VESSELS:  Normal caliber thoracic aorta. No evidence of acute intramural hematoma.   HEART: Mildly enlarged.  Severe coronary artery calcifications. No significant pericardial effusion.   LUNG, AIRWAYS, PLEURA: There is mild atelectasis scattered throughout all lobes of both lungs, most pronounced in the right middle lobe and right lower lobe. There is a trace right pleural effusion. No pneumothorax. No evidence of pulmonary contusion or laceration.   CHEST WALL SOFT TISSUES: Soft tissue stranding along the posterolateral right chest wall.   OSSEOUS STRUCTURES: There is an acute nondisplaced fracture of the lateral right 7th rib, posterolateral right 8th rib, posterolateral right 9th rib, and posterior right 10th rib. Most of these are best visualized on the sagittal images.     CT ABDOMEN/PELVIS:   ABDOMINAL WALL: No acute abnormality. Upper abdominal wall scar noted.   LIVER: Postsurgical changes of orthotopic liver transplant are noted. There is no evidence of an acute subcapsular hematoma or peritransplant hemorrhage. Within the subcapsular aspect of segment 6 there is a 0.9 cm area of parenchymal hypodensity at the level of the 9th-10th ribs.   BILE DUCTS: Common duct stent noted extending into the duodenum. No biliary ductal dilatation.   GALLBLADDER: Surgically absent.   SPLEEN: Enlarged measuring 14.8 cm in craniocaudal length. No evidence of sizable parenchymal laceration or acute subcapsular hematoma.   PANCREAS: No significant abnormality.   ADRENALS: No acute abnormality.   KIDNEYS, URETERS, BLADDER: There is a 0.2 cm left renal calculus. There is no right renal calculus. There is no hydronephrosis. There is no evidence of an acute subcapsular hematoma or perinephric hemorrhage. The urinary bladder wall thickness appears within normal limits for degree of distention.   REPRODUCTIVE ORGANS: No significant abnormality.   VESSELS: Mild aortic bi-iliac atherosclerosis  without evidence of abdominal aortic aneurysm or para-aortic hematoma.   LYMPH NODES/RETROPERITONEUM: No acute retroperitoneal abnormality. No enlarged lymph nodes.   BOWEL/MESENTERY/PERITONEUM: There is colonic diverticulosis without evidence for diverticulitis.  No bowel wall thickening or dilatation. Normal appendix. No ascites, free air or fluid collection.   MUSCULOSKELETAL: No acute osseous abnormality.     CT THORACIC SPINE:   PARASPINAL SOFT TISSUES: No paravertebral fluid collection or significant edema. ALIGNMENT:  No traumatic spondylolisthesis or traumatic facet widening. VERTEBRAE:  No acute fracture. Vertebral body heights are maintained. SPINAL CANAL/INTERVERTEBRAL DISCS: No high-grade spinal canal stenosis. Minor multilevel disc spur complexes, for example at T7-T8, T8-T9, T9-T10.     CT LUMBAR SPINE:   PARASPINAL SOFT TISSUES: No paravertebral fluid collection or significant edema. ALIGNMENT:  No traumatic spondylolisthesis or traumatic facet widening. VERTEBRAE:  No acute fracture.  Vertebral body heights are maintained. SPINAL CANAL/INTERVERTEBRAL DISCS: No high-grade spinal canal stenosis. No significant disc height loss. Minor central disc spur complex at L5-S1 and a small intraforaminal disc spur complex at L4-L5. NEURAL FORAMINA: No high-grade foraminal stenosis. There is mild bilateral L4-L5 foraminal stenosis.       CT CHEST/ABDOMEN/PELVIS: 1. Acute nondisplaced fracture of the lateral right 7th rib, posterolateral right 8th rib, posterolateral right 9th rib, and posterior right 10th rib 2. Orthotopic liver transplant with a focal subcapsular linear hypoattenuating area in segment 6 between the 9th and 10th ribs (fracture) and with overlying chest wall edema. Although a unable to be completely characterized without contrast, this is concerning for grade 1 laceration given its location immediately adjacent to the rib fractures and chest wall contusion. No acute subcapsular hematoma or  perihepatic acute hemorrhage. 3. Splenomegaly reflecting prior history of portal hypertension, improved from prior study. 4. Colonic diverticulosis without acute diverticulitis.   CT THORACIC AND LUMBAR SPINE: 1. No acute fracture or traumatic malalignment. 2. Degenerative changes of the thoracic and lumbar spine without high-grade canal stenosis.   MACRO: None.   Signed by: Reza Rodriguez 6/3/2024 1:02 AM Dictation workstation:   [id number]    CT chest abdomen pelvis wo IV contrast    Result Date: 6/3/2024  Interpreted By:  Reza Rodriguez, STUDY: CT CHEST ABDOMEN PELVIS WO CONTRAST; CT LUMBAR SPINE WO IV CONTRAST; CT THORACIC SPINE WO IV CONTRAST;  6/2/2024 11:45 pm   INDICATION: Signs/Symptoms:right lateral chest wall injury. s/p liver transplant. eval for blunt liver/renal lac; Signs/Symptoms:fall.   COMPARISON: 08/08/2023 CT abdomen/pelvis without contrast   ACCESSION NUMBER(S): [id number]; [id number]; [id number]   ORDERING CLINICIAN: LULU KELLY   TECHNIQUE: Contiguous axial images of the chest, abdomen, and pelvis were obtained without iodinated contrast. Coronal and sagittal reformatted images were reconstructed from the axial data.   Multiplanar reformatted images of the thoracic and lumbar spine were reconstructed from source data of concurrent CT chest/abdomen/pelvis acquisition.   FINDINGS: CT CHEST:   MEDIASTINUM AND LYMPH NODES:  The esophagus appears within normal limits.  No enlarged intrathoracic or axillary lymph nodes by imaging criteria. No pneumomediastinum.   VESSELS:  Normal caliber thoracic aorta. No evidence of acute intramural hematoma.   HEART: Mildly enlarged.  Severe coronary artery calcifications. No significant pericardial effusion.   LUNG, AIRWAYS, PLEURA: There is mild atelectasis scattered throughout all lobes of both lungs, most pronounced in the right middle lobe and right lower lobe. There is a trace right pleural effusion. No pneumothorax. No evidence of pulmonary  contusion or laceration.   CHEST WALL SOFT TISSUES: Soft tissue stranding along the posterolateral right chest wall.   OSSEOUS STRUCTURES: There is an acute nondisplaced fracture of the lateral right 7th rib, posterolateral right 8th rib, posterolateral right 9th rib, and posterior right 10th rib. Most of these are best visualized on the sagittal images.     CT ABDOMEN/PELVIS:   ABDOMINAL WALL: No acute abnormality. Upper abdominal wall scar noted.   LIVER: Postsurgical changes of orthotopic liver transplant are noted. There is no evidence of an acute subcapsular hematoma or peritransplant hemorrhage. Within the subcapsular aspect of segment 6 there is a 0.9 cm area of parenchymal hypodensity at the level of the 9th-10th ribs.   BILE DUCTS: Common duct stent noted extending into the duodenum. No biliary ductal dilatation.   GALLBLADDER: Surgically absent.   SPLEEN: Enlarged measuring 14.8 cm in craniocaudal length. No evidence of sizable parenchymal laceration or acute subcapsular hematoma.   PANCREAS: No significant abnormality.   ADRENALS: No acute abnormality.   KIDNEYS, URETERS, BLADDER: There is a 0.2 cm left renal calculus. There is no right renal calculus. There is no hydronephrosis. There is no evidence of an acute subcapsular hematoma or perinephric hemorrhage. The urinary bladder wall thickness appears within normal limits for degree of distention.   REPRODUCTIVE ORGANS: No significant abnormality.   VESSELS: Mild aortic bi-iliac atherosclerosis without evidence of abdominal aortic aneurysm or para-aortic hematoma.   LYMPH NODES/RETROPERITONEUM: No acute retroperitoneal abnormality. No enlarged lymph nodes.   BOWEL/MESENTERY/PERITONEUM: There is colonic diverticulosis without evidence for diverticulitis.  No bowel wall thickening or dilatation. Normal appendix. No ascites, free air or fluid collection.   MUSCULOSKELETAL: No acute osseous abnormality.     CT THORACIC SPINE:   PARASPINAL SOFT TISSUES: No  paravertebral fluid collection or significant edema. ALIGNMENT:  No traumatic spondylolisthesis or traumatic facet widening. VERTEBRAE:  No acute fracture. Vertebral body heights are maintained. SPINAL CANAL/INTERVERTEBRAL DISCS: No high-grade spinal canal stenosis. Minor multilevel disc spur complexes, for example at T7-T8, T8-T9, T9-T10.     CT LUMBAR SPINE:   PARASPINAL SOFT TISSUES: No paravertebral fluid collection or significant edema. ALIGNMENT:  No traumatic spondylolisthesis or traumatic facet widening. VERTEBRAE:  No acute fracture.  Vertebral body heights are maintained. SPINAL CANAL/INTERVERTEBRAL DISCS: No high-grade spinal canal stenosis. No significant disc height loss. Minor central disc spur complex at L5-S1 and a small intraforaminal disc spur complex at L4-L5. NEURAL FORAMINA: No high-grade foraminal stenosis. There is mild bilateral L4-L5 foraminal stenosis.       CT CHEST/ABDOMEN/PELVIS: 1. Acute nondisplaced fracture of the lateral right 7th rib, posterolateral right 8th rib, posterolateral right 9th rib, and posterior right 10th rib 2. Orthotopic liver transplant with a focal subcapsular linear hypoattenuating area in segment 6 between the 9th and 10th ribs (fracture) and with overlying chest wall edema. Although a unable to be completely characterized without contrast, this is concerning for grade 1 laceration given its location immediately adjacent to the rib fractures and chest wall contusion. No acute subcapsular hematoma or perihepatic acute hemorrhage. 3. Splenomegaly reflecting prior history of portal hypertension, improved from prior study. 4. Colonic diverticulosis without acute diverticulitis.   CT THORACIC AND LUMBAR SPINE: 1. No acute fracture or traumatic malalignment. 2. Degenerative changes of the thoracic and lumbar spine without high-grade canal stenosis.   MACRO: None.   Signed by: Reza Rodriguez 6/3/2024 1:02 AM Dictation workstation:   [id number]      Assessment  Status  post mechanical fall grade 1 liver laceration right seventh through 10th rib fracture-tertiary exam    On exam patient is alert and oriented x 4.  No acute distress.  Sensation intact.  Motor function intact.  Hand  strength 5 out of 5.  Tenderness with palpation to right chest wall.  Tenderness with palpation to right upper quadrant.  Labs this morning show no leukocytosis.  Creatinine 2.08 down from 2.39 yesterday.  Afebrile.      Plan  -Advance to a carb diet  -Continue IVF  -PT/OT consulted  -Pain control  -Nausea control  -DVT prophylaxis-can start pharmacologic tomorrow if patient is not discharged  -Pulmonary toileting   -Encourage ambulation      Further recommendations per Dr. Alphonse Sandoval, APRN-CNP    Time spent  37  minutes obtaining labs, imaging, recommendations, interview, assessment, examination, medication review/ordering, and EMR review.    Plan of care was discussed extensively with patient. Patient verbalized understanding through teach back method. All questions and concerns addressed upon examination.     Of note, this documentation is completed using the Dragon Dictation system (voice recognition software). There may be spelling and/or grammatical errors that were not corrected prior to final submission.

## 2024-06-04 NOTE — DOCUMENTATION CLARIFICATION NOTE
"    PATIENT:               HUANG FLOREZ  ACCT #:                  [id number]  MRN:                       [id number]  :                       1970  ADMIT DATE:       2024 11:02 PM  DISCH DATE:  RESPONDING PROVIDER #:        [id number]          PROVIDER RESPONSE TEXT:    Acute Kidney Injury ruled in as evidenced by increased creatining from baseline    CDI QUERY TEXT:    Clarification    Instruction:    Based on your assessment of the patient and the clinical information, please provide the requested documentation by clicking on the appropriate radio button and enter any additional information if prompted.    Question: Please clarify the diagnosis of Acute Kidney Injury    When answering this query, please exercise your independent professional judgment. The fact that a question is being asked, does not imply that any particular answer is desired or expected.    The patient's clinical indicators include:  Clinical Information:  The patient is a 53 year old male who presented to the ED with right-sided chest wall pain after falling.  He was found to have a liver laceration with multiple right-sided rib fractures.  His PMH includes DM, CKD, hypothyroidism, GERD and a hx of a liver transplant d/t cirrhosis.    Documented Diagnosis:  \"LETITIA\" is documented on the 6/3  by JEMMA Hill-CNP.    ED Note  \"Mild thrombocytopenia at baseline.  CKD at baseline.\"     6/3 \"LETITIA-baseline creatinine 1.\"    Clinical Indicators and Documentation:  -Vital Signs:  :  36.7, 89, 18, 162/81, 96 percent on RA  -Baseline Creatinine:  1.81  -SCr lab values:  6/3:  2.39  :  2.08  -Urine Output: 500mL per I/O flowsheet  -Electrolyte lab values:  6/3:  Na 131, K 4.6, Cl 106  -Nephrology consult:  No    Treatment:  IV fluids    Risk Factors:  CKD, DM, liver transplant  Options provided:  -- Acute Kidney Injury is ruled out  -- Acute Kidney Injury ruled in as evidenced by, Please specify additional information " below  -- Other - I will add my own diagnosis  -- Refer to Clinical Documentation Reviewer    Query created by: Naya Salazar on 6/4/2024 1:02 PM      Electronically signed by:  LESLYE LI MD 6/4/2024 2:50 PM

## 2024-06-05 ENCOUNTER — APPOINTMENT (OUTPATIENT)
Dept: RADIOLOGY | Facility: HOSPITAL | Age: 54
DRG: 184 | End: 2024-06-05
Payer: COMMERCIAL

## 2024-06-05 LAB
ALBUMIN SERPL BCP-MCNC: 4 G/DL (ref 3.4–5)
ALP SERPL-CCNC: 96 U/L (ref 33–120)
ALT SERPL W P-5'-P-CCNC: 17 U/L (ref 10–52)
ANION GAP SERPL CALC-SCNC: 8 MMOL/L (ref 10–20)
AST SERPL W P-5'-P-CCNC: 13 U/L (ref 9–39)
BILIRUB SERPL-MCNC: 0.9 MG/DL (ref 0–1.2)
BUN SERPL-MCNC: 40 MG/DL (ref 6–23)
CALCIUM SERPL-MCNC: 9.1 MG/DL (ref 8.6–10.3)
CHLORIDE SERPL-SCNC: 110 MMOL/L (ref 98–107)
CO2 SERPL-SCNC: 21 MMOL/L (ref 21–32)
CREAT SERPL-MCNC: 1.49 MG/DL (ref 0.5–1.3)
EGFRCR SERPLBLD CKD-EPI 2021: 55 ML/MIN/1.73M*2
ERYTHROCYTE [DISTWIDTH] IN BLOOD BY AUTOMATED COUNT: 13.1 % (ref 11.5–14.5)
GLUCOSE BLD MANUAL STRIP-MCNC: 135 MG/DL (ref 74–99)
GLUCOSE BLD MANUAL STRIP-MCNC: 175 MG/DL (ref 74–99)
GLUCOSE BLD MANUAL STRIP-MCNC: 184 MG/DL (ref 74–99)
GLUCOSE BLD MANUAL STRIP-MCNC: 244 MG/DL (ref 74–99)
GLUCOSE SERPL-MCNC: 177 MG/DL (ref 74–99)
HCT VFR BLD AUTO: 30 % (ref 41–52)
HGB BLD-MCNC: 10.5 G/DL (ref 13.5–17.5)
HOLD SPECIMEN: NORMAL
MCH RBC QN AUTO: 28.8 PG (ref 26–34)
MCHC RBC AUTO-ENTMCNC: 35 G/DL (ref 32–36)
MCV RBC AUTO: 82 FL (ref 80–100)
NRBC BLD-RTO: 0 /100 WBCS (ref 0–0)
PLATELET # BLD AUTO: 116 X10*3/UL (ref 150–450)
POTASSIUM SERPL-SCNC: 5 MMOL/L (ref 3.5–5.3)
PROT SERPL-MCNC: 6.3 G/DL (ref 6.4–8.2)
RBC # BLD AUTO: 3.64 X10*6/UL (ref 4.5–5.9)
SODIUM SERPL-SCNC: 134 MMOL/L (ref 136–145)
TACROLIMUS BLD-MCNC: 22.2 NG/ML
WBC # BLD AUTO: 4.8 X10*3/UL (ref 4.4–11.3)

## 2024-06-05 PROCEDURE — 2500000002 HC RX 250 W HCPCS SELF ADMINISTERED DRUGS (ALT 637 FOR MEDICARE OP, ALT 636 FOR OP/ED): Performed by: STUDENT IN AN ORGANIZED HEALTH CARE EDUCATION/TRAINING PROGRAM

## 2024-06-05 PROCEDURE — 71045 X-RAY EXAM CHEST 1 VIEW: CPT

## 2024-06-05 PROCEDURE — 2500000005 HC RX 250 GENERAL PHARMACY W/O HCPCS

## 2024-06-05 PROCEDURE — 36415 COLL VENOUS BLD VENIPUNCTURE: CPT | Performed by: REGISTERED NURSE

## 2024-06-05 PROCEDURE — 97165 OT EVAL LOW COMPLEX 30 MIN: CPT | Mod: GO

## 2024-06-05 PROCEDURE — 99222 1ST HOSP IP/OBS MODERATE 55: CPT | Performed by: STUDENT IN AN ORGANIZED HEALTH CARE EDUCATION/TRAINING PROGRAM

## 2024-06-05 PROCEDURE — 1210000001 HC SEMI-PRIVATE ROOM DAILY

## 2024-06-05 PROCEDURE — 2500000004 HC RX 250 GENERAL PHARMACY W/ HCPCS (ALT 636 FOR OP/ED)

## 2024-06-05 PROCEDURE — 80197 ASSAY OF TACROLIMUS: CPT | Mod: ELYLAB | Performed by: REGISTERED NURSE

## 2024-06-05 PROCEDURE — 2500000002 HC RX 250 W HCPCS SELF ADMINISTERED DRUGS (ALT 637 FOR MEDICARE OP, ALT 636 FOR OP/ED)

## 2024-06-05 PROCEDURE — 2500000001 HC RX 250 WO HCPCS SELF ADMINISTERED DRUGS (ALT 637 FOR MEDICARE OP)

## 2024-06-05 PROCEDURE — 82947 ASSAY GLUCOSE BLOOD QUANT: CPT

## 2024-06-05 PROCEDURE — 84075 ASSAY ALKALINE PHOSPHATASE: CPT | Performed by: REGISTERED NURSE

## 2024-06-05 PROCEDURE — 85027 COMPLETE CBC AUTOMATED: CPT | Performed by: REGISTERED NURSE

## 2024-06-05 PROCEDURE — 97161 PT EVAL LOW COMPLEX 20 MIN: CPT | Mod: GP | Performed by: PHYSICAL THERAPIST

## 2024-06-05 PROCEDURE — 71045 X-RAY EXAM CHEST 1 VIEW: CPT | Performed by: RADIOLOGY

## 2024-06-05 RX ORDER — INSULIN LISPRO 100 [IU]/ML
0-10 INJECTION, SOLUTION INTRAVENOUS; SUBCUTANEOUS
Status: DISCONTINUED | OUTPATIENT
Start: 2024-06-05 | End: 2024-06-06 | Stop reason: HOSPADM

## 2024-06-05 RX ORDER — ENOXAPARIN SODIUM 100 MG/ML
40 INJECTION SUBCUTANEOUS EVERY 24 HOURS
Status: DISCONTINUED | OUTPATIENT
Start: 2024-06-05 | End: 2024-06-06 | Stop reason: HOSPADM

## 2024-06-05 RX ADMIN — HYDROMORPHONE HYDROCHLORIDE 0.2 MG: 0.2 INJECTION, SOLUTION INTRAMUSCULAR; INTRAVENOUS; SUBCUTANEOUS at 20:33

## 2024-06-05 RX ADMIN — OXYCODONE HYDROCHLORIDE 5 MG: 5 TABLET ORAL at 05:04

## 2024-06-05 RX ADMIN — CARVEDILOL 25 MG: 12.5 TABLET, FILM COATED ORAL at 08:00

## 2024-06-05 RX ADMIN — SENNOSIDES 17.2 MG: 8.6 TABLET, FILM COATED ORAL at 20:33

## 2024-06-05 RX ADMIN — METHOCARBAMOL TABLETS 500 MG: 500 TABLET, COATED ORAL at 05:04

## 2024-06-05 RX ADMIN — METHOCARBAMOL TABLETS 500 MG: 500 TABLET, COATED ORAL at 23:24

## 2024-06-05 RX ADMIN — INSULIN LISPRO 2 UNITS: 100 INJECTION, SOLUTION INTRAVENOUS; SUBCUTANEOUS at 20:34

## 2024-06-05 RX ADMIN — CARVEDILOL 25 MG: 12.5 TABLET, FILM COATED ORAL at 17:24

## 2024-06-05 RX ADMIN — ATORVASTATIN CALCIUM 20 MG: 20 TABLET, FILM COATED ORAL at 08:00

## 2024-06-05 RX ADMIN — INSULIN LISPRO 2 UNITS: 100 INJECTION, SOLUTION INTRAVENOUS; SUBCUTANEOUS at 11:17

## 2024-06-05 RX ADMIN — LEVOTHYROXINE SODIUM 50 MCG: 50 TABLET ORAL at 08:00

## 2024-06-05 RX ADMIN — OXYCODONE HYDROCHLORIDE 5 MG: 5 TABLET ORAL at 17:24

## 2024-06-05 RX ADMIN — OXYCODONE HYDROCHLORIDE 5 MG: 5 TABLET ORAL at 11:17

## 2024-06-05 RX ADMIN — INSULIN HUMAN 6 UNITS: 100 INJECTION, SUSPENSION SUBCUTANEOUS at 08:00

## 2024-06-05 RX ADMIN — METHOCARBAMOL TABLETS 500 MG: 500 TABLET, COATED ORAL at 17:24

## 2024-06-05 RX ADMIN — OXYCODONE HYDROCHLORIDE 5 MG: 5 TABLET ORAL at 23:24

## 2024-06-05 RX ADMIN — LIDOCAINE 1 PATCH: 4 PATCH TOPICAL at 08:00

## 2024-06-05 RX ADMIN — HYDROMORPHONE HYDROCHLORIDE 0.2 MG: 0.2 INJECTION, SOLUTION INTRAMUSCULAR; INTRAVENOUS; SUBCUTANEOUS at 03:21

## 2024-06-05 RX ADMIN — ENOXAPARIN SODIUM 40 MG: 40 INJECTION SUBCUTANEOUS at 20:34

## 2024-06-05 RX ADMIN — METHOCARBAMOL TABLETS 500 MG: 500 TABLET, COATED ORAL at 11:17

## 2024-06-05 RX ADMIN — PANTOPRAZOLE SODIUM 40 MG: 40 TABLET, DELAYED RELEASE ORAL at 08:00

## 2024-06-05 RX ADMIN — TACROLIMUS 5 MG: 1 CAPSULE ORAL at 05:05

## 2024-06-05 RX ADMIN — SENNOSIDES 17.2 MG: 8.6 TABLET, FILM COATED ORAL at 08:00

## 2024-06-05 RX ADMIN — FLUDROCORTISONE ACETATE 0.1 MG: 0.1 TABLET ORAL at 08:00

## 2024-06-05 ASSESSMENT — PAIN SCALES - GENERAL
PAINLEVEL_OUTOF10: 0 - NO PAIN
PAINLEVEL_OUTOF10: 10 - WORST POSSIBLE PAIN
PAINLEVEL_OUTOF10: 7
PAINLEVEL_OUTOF10: 6
PAINLEVEL_OUTOF10: 2
PAINLEVEL_OUTOF10: 0 - NO PAIN

## 2024-06-05 ASSESSMENT — PAIN DESCRIPTION - LOCATION
LOCATION: ABDOMEN
LOCATION: RIB CAGE

## 2024-06-05 ASSESSMENT — COGNITIVE AND FUNCTIONAL STATUS - GENERAL
MOBILITY SCORE: 24
MOBILITY SCORE: 24
DAILY ACTIVITIY SCORE: 24
DAILY ACTIVITIY SCORE: 24

## 2024-06-05 ASSESSMENT — PAIN DESCRIPTION - ORIENTATION
ORIENTATION: RIGHT
ORIENTATION: RIGHT

## 2024-06-05 ASSESSMENT — PAIN - FUNCTIONAL ASSESSMENT
PAIN_FUNCTIONAL_ASSESSMENT: 0-10

## 2024-06-05 ASSESSMENT — ACTIVITIES OF DAILY LIVING (ADL): BATHING_ASSISTANCE: STAND BY

## 2024-06-05 ASSESSMENT — PAIN DESCRIPTION - DESCRIPTORS: DESCRIPTORS: ACHING

## 2024-06-05 NOTE — CONSULTS
[facility] Internal Medicine Consultation Note  ASSESSMENT & PLAN:     Inpatient consult to Medicine  Consult performed by: Jitendra Patino MD  Consult ordered by: Karin Johnson PA-C  Reason for consult: Diabetes management      1.  Mechanical fall  2.  Acute nondisplaced right lateral seventh rib, posterior lateral 8th/9th/10th rib fractures  3.  Grade 1 liver laceration  4.  LETITIA on CKD stage III, resolved  5.  Type 2 diabetes mellitus, insulin-dependent with hyperglycemia  6.  Chronic hyperkalemia  7.  Nonalcoholic fatty liver disease with cirrhosis status post liver transplant   8.  Chronically immunosuppressed state  9.  Hypothyroidism  10.  Umbilical hernia  11.  Chronic hyperkalemia  12.  Essential hypertension    PLAN:  - Reviewed home medications already reconciled by primary team, patient still having hyperglycemia therefore we will increase his insulin protocol, increase the - NPH insulin from 6 units to 12 units for tomorrow  - Primary team has already resumed home tacrolimus, Veltassa, fludrocortisone, carvedilol, levothyroxine  - Continue trending hemoglobin for his grade 1 laceration per trauma surgery  - Renal function is also improving and near his baseline  - Continue pain regimen as per trauma surgery  - Tacrolimus level 22.2, will hold today's dose - discussed with nursing - and resume tomorrow 6/6    VTE Prophylaxis: SCDs    Thank you for allowing us to participate in the patient's care and management. Internal medicine team will follow peripherally.  Please reach out to me at any time with any questions.      ---Of note, this documentation is completed using the Dragon Dictation system (voice recognition software). There may be spelling and/or grammatical errors that were not corrected prior to final submission.---    Jitendra Patino MD    HISTORY OF PRESENT ILLNESS:   Consult Reason: Diabetes management    History Of Present Illness:    Walker Cuenca IV is a 53 y.o. male with a  significant past medical history of type II IDDM, hypothyroidism, GERD, nonalcoholic fatty liver disease with cirrhosis now status post orthotopic liver transplant in October 2023, hyperkalemia that was admitted after a fall with right rib fractures to the trauma service.    Internal medicine consulted for diabetes management.    Patient was seen and examined at bedside on day 3 of admission today.  States that he is having stable right-sided rib pain however does have episodes of muscle spasms and worsening with deep breaths.  He is being managed by the trauma surgery team with regards to pain medications.  He states that he has been taking his incentive spirometer regularly throughout the day.    For his type 2 diabetes, patient uses 12 units of NPH in the morning and then correction factor with lunch and dinner.  He is on Florinef and Veltassa for chronic hyperkalemia.  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  Postoperative, course was complicated by CMV viremia C. difficile and biliary stricture.  He had multiple ERCPs postoperatively and has good follow-up with his transplant surgeon at [facility].     Review of systems: 10 point review of systems is otherwise negative except as mentioned above.    PAST HISTORIES:     Past Medical History: Nonalcoholic fatty liver disease with cirrhosis status post liver transplant, type II DM, hypothyroidism, chronic hyperkalemia    Past Surgical History: Liver transplant, multiple paracentesis, ERCP       Social History: He was tobacco smoker, quit approximately 25 years ago.  Denies current alcohol, illicit drug use.  Lives at home with his son.  Was working post liver transplant however currently not working after this fall and rib fractures.    Family History: Mother with nonalcoholic fatty liver disease.     Allergies: Patient has no known allergies.    OBJECTIVE:       Last Recorded Vitals:  Vitals:     06/04/24 1933 06/05/24 0015 06/05/24 0749 06/05/24 1528   BP: 166/89 156/78 155/82 158/84   Pulse: 86 82 83 69   Resp: 18 18  18   Temp: 37 °C (98.6 °F) 37.5 °C (99.5 °F) 36.8 °C (98.2 °F) 36.6 °C (97.9 °F)   TempSrc:       SpO2: 96% 96% 94% 98%   Weight:       Height:         Last I/O:  I/O last 3 completed shifts:  In: 1015 (11.8 mL/kg) [I.V.:915 (10.6 mL/kg); IV Piggyback:100]  Out: - (0 mL/kg)   Weight: 86.2 kg     Physical Exam  Vitals reviewed.   Constitutional:       General: He is not in acute distress.     Appearance: Normal appearance. He is not ill-appearing.   HENT:      Head: Normocephalic and atraumatic.      Nose: Nose normal.      Mouth/Throat:      Mouth: Mucous membranes are moist.   Eyes:      Extraocular Movements: Extraocular movements intact.      Conjunctiva/sclera: Conjunctivae normal.   Cardiovascular:      Rate and Rhythm: Normal rate and regular rhythm.      Pulses: Normal pulses.      Heart sounds: Normal heart sounds.   Pulmonary:      Effort: Pulmonary effort is normal.      Breath sounds: Normal breath sounds.      Comments: Breath sounds diminished to auscultation bilaterally as patient is in pain with deep inspiration.  Chest:      Comments: Right chest wall tenderness to deep palpation.  Abdominal:      General: Bowel sounds are normal.      Palpations: Abdomen is soft.      Tenderness: There is abdominal tenderness. There is no guarding.      Hernia: A hernia is present.      Comments: Large surgical scar present from liver transplant.  Right upper quadrant and chest wall tenderness to deep palpation.  Umbilical hernia present.   Musculoskeletal:         General: No swelling or tenderness. Normal range of motion.      Cervical back: Normal range of motion and neck supple.   Neurological:      General: No focal deficit present.      Mental Status: He is alert and oriented to person, place, and time. Mental status is at baseline.   Psychiatric:         Mood and Affect: Mood normal.          Behavior: Behavior normal.     Inpatient Medications:  [Held by provider] aspirin, 81 mg, oral, Daily  atorvastatin, 20 mg, oral, Daily  carvedilol, 25 mg, oral, BID  fludrocortisone, 0.1 mg, oral, Daily  insulin lispro, 0-5 Units, subcutaneous, Before meals & nightly  insulin NPH (Isophane), 6 Units, subcutaneous, q24h MIRACLE  levothyroxine, 50 mcg, oral, Daily  lidocaine, 1 patch, transdermal, Daily  methocarbamol, 500 mg, oral, q6h MIRACLE  oxyCODONE, 5 mg, oral, q6h  pantoprazole, 40 mg, oral, Daily before breakfast  sennosides, 2 tablet, oral, BID  tacrolimus, 5 mg, oral, q12h MIRACLE      PRN medications: dextrose, dextrose, glucagon, glucagon, HYDROmorphone        Outpatient Medications:  Prior to Admission medications    Medication Sig Start Date End Date Taking? Authorizing Provider   albuterol 90 mcg/actuation inhaler USE 1-2 PUFFS EVERY 4 HOURS AS NEEDED 7/13/20  Yes Historical Provider, MD   aspirin 81 mg chewable tablet Chew 1 tablet (81 mg) once daily. 10/31/23  Yes Historical Provider, MD   atorvastatin (Lipitor) 20 mg tablet Take 1 tablet (20 mg) by mouth once daily. 3/29/24  Yes Jackson Damon MD   carvedilol (Coreg) 25 mg tablet Take 1 tablet (25 mg) by mouth 2 times a day with meals. 12/27/23  Yes Jackson Damon MD   fludrocortisone (Florinef) 0.1 mg tablet Take 1 tablet (0.1 mg) by mouth once daily. 4/25/24  Yes Historical Provider, MD   HumuLIN N NPH Insulin KwikPen 100 unit/mL (3 mL) injection Inject 12 Units under the skin. 10/30/23  Yes Historical Provider, MD   levothyroxine (Synthroid, Levoxyl) 50 mcg tablet Take 1 tablet (50 mcg) by mouth once daily.  Patient taking differently: Take 1 tablet (50 mcg) by mouth once daily at bedtime. 3/29/24  Yes Jackson Damon MD   pantoprazole (ProtoNix) 40 mg EC tablet Take 1 tablet (40 mg) by mouth once daily in the morning. Take before meals. 3/29/24  Yes Jackson Damon MD   tacrolimus (Prograf) 1 mg capsule Take 5 capsules (5 mg) by mouth twice a day.  11/21/23  Yes Historical Provider, MD Rizo 8.4 gram powder in packet Take 8.4 g by mouth once daily. 4/25/24  Yes Historical Provider, MD   Nitrostat 0.4 mg SL tablet Place under the tongue. 12/10/21   Historical Provider, MD   OneTouch Ultra Test strip 1 strip 2 times a day. 3/29/24   Jackson Damon MD   sulfamethoxazole-trimethoprim (Bactrim DS) 800-160 mg tablet Take 1 tablet by mouth 3 times a week. Mondays, Wednesdays and Fridays    Historical Provider, MD       LABS AND IMAGING:     Labs:  Results from last 7 days   Lab Units 06/05/24  0542 06/04/24  0529 06/03/24  1804   WBC AUTO x10*3/uL 4.8 5.3 5.1   RBC AUTO x10*6/uL 3.64* 3.69* 3.75*   HEMOGLOBIN g/dL 10.5* 10.6* 10.8*   HEMATOCRIT % 30.0* 30.6* 30.9*   MCV fL 82 83 82   MCH pg 28.8 28.7 28.8   MCHC g/dL 35.0 34.6 35.0   RDW % 13.1 13.4 13.6   PLATELETS AUTO x10*3/uL 116* 125* 122*     Results from last 7 days   Lab Units 06/05/24  0542 06/04/24  0528 06/03/24  0000   SODIUM mmol/L 134* 134* 131*   POTASSIUM mmol/L 5.0 5.1 4.6   CHLORIDE mmol/L 110* 109* 106   CO2 mmol/L 21 19* 16*   BUN mg/dL 40* 53* 57*   CREATININE mg/dL 1.49* 2.08* 2.39*   GLUCOSE mg/dL 177* 209* 311*   PROTEIN TOTAL g/dL 6.3*  --  6.8   CALCIUM mg/dL 9.1 8.7 8.9   BILIRUBIN TOTAL mg/dL 0.9  --  0.8   ALK PHOS U/L 96  --  118   AST U/L 13  --  12   ALT U/L 17  --  16               Imaging:  XR chest 1 view  Narrative: Interpreted By:  Jr Leggett,   STUDY:  XR CHEST 1 VIEW;  6/5/2024 2:32 pm      INDICATION:  Signs/Symptoms:Right rib fractures with pain, check for effusion or  fluid accumulation.      COMPARISON:  CT Chest, chest 2 June 2024; portable chest 13 September 2023      ACCESSION NUMBER(S):  [id number]      ORDERING CLINICIAN:  DANIEL ROMERO      TECHNIQUE:  Single frontal view of the chest; Portable technique      FINDINGS:      The cardiomediastinal silhouette is unchanged      No consolidative lung opacity      No edema / failure      No large pleural effusion or  demonstrable pneumothorax      Impression: No acute disease in the chest      There was no hemothorax or other abnormal pleural fluid on either  side on the CT 2 June 2024      If detection of small quantity of pleural fluid is needed, a proper  two view chest radiograph would be needed. Pleural effusions of less  than 200 mL are usually occult on a portable frontal chest radiograph      MACRO:  None      Signed by: Jr Leggett 6/5/2024 2:45 PM  Dictation workstation:   [id number]

## 2024-06-05 NOTE — CARE PLAN
The patient's goals for the shift include      The clinical goals for the shift include Pt will state decrease in pain via pain scale by end of this shift

## 2024-06-05 NOTE — PROGRESS NOTES
Physical Therapy    Physical Therapy Evaluation    Patient Name: Walker Cuenca IV  MRN: [id number]  Today's Date: 6/5/2024   Time Calculation  Start Time: 0940  Stop Time: 0958  Time Calculation (min): 18 min  910/910-B    Assessment/Plan   PT Assessment  Barriers to Discharge: None identified  Assessment Comment: Pt. presents with guarded movement due to rib fxs, however, pt. is ambulating safely without AD and without assist.  End of Session Patient Position: Bed, 2 rail up, Alarm off, not on at start of session  IP OR SWING BED PT PLAN  Inpatient or Swing Bed: Inpatient  PT Plan  PT Plan: PT Eval only  PT Eval Only Reason: No acute PT needs identified  PT Frequency: PT eval only  PT Discharge Recommendations: No further acute PT  PT Recommended Transfer Status: Independent  PT - OK to Discharge: Yes    Subjective     Current Problem:  1. Closed fracture of multiple ribs of right side, initial encounter        2. Fall, initial encounter              General Visit Information:  General  Reason for Referral: Impaired mobility  Referred By: Quintin PT/OT eval and tx 6/5/24  Past Medical History Relevant to Rehab: recent liver transplant (10/2023) d/t PRESCOTT/ETOH-related cirrhosis, multiple abdominal paracentesis procedures in 2023, former smoker,  HTN, depression, anxiety, C-diff  Family/Caregiver Present: No  Prior to Session Communication: Bedside nurse  Patient Position Received: Bed, 3 rail up, Alarm off, not on at start of session (pt. sleeping upon arrival)  General Comment: To ED on 6/2 s/p fall on 6/1 sustaining closed fxs of multiple ribs on R side; denied head injury. CT chest/abd- acute nondisplaced fx of lateral R 7th rib, posteriolateral R 8th and 9th rib fxs, posterior R 10th rib fx; concern for grade 1 laceration of liver. CT T&L spine (-) acute fxs    Home Living:  Home Living  Home Living Comments: son lives with pt, son works 2nd shift, 1 story, 1+1 HAKEEM, no AD, Tub/shower no DME    Prior Level of  Function:  Prior Function Per Pt/Caregiver Report  Prior Function Comments: Pt indep with ambulation without AD, indep. with ADL and IADLS, drives, reports one fall (cause of current adm). Works as an  full time    Precautions:  Precautions  Medical Precautions:  (rib fxs; discussed splinting with pillows, log roll technique and encouraged OOB mobility)      Pain:  Pain Assessment  Pain Assessment: 0-10  Pain Score:  (pt reports pain with facial grimacing and auditory moaning, but did not rate pain when asked several times)  Pain Type: Acute pain  Pain Location: Rib cage  Pain Orientation: Right    Cognition:  Cognition  Overall Cognitive Status: Within Functional Limits    General Assessments:            Strength  Strength Comments: BLE 5/5, BUE WFL (pain with RUE AROM, but WFL)                 Functional Assessments:     Bed Mobility  Bed Mobility:  (sit to/from supine with HOB elevated and mod. indep; reviewed log roll technique and pt. verbalized understanding. Pt. encouraged to practice this in hospital with HOB down, to mimic home environment)  Transfers  Transfer:  (sit to/from stand without AD and mod. indep; increased time required to manage pain during transitional movements)  Ambulation/Gait Training  Ambulation/Gait Training Performed:  (Amb. 75 ft. x 2 without AD and mod indep. Slow pace, but steady with no LOB; somewhat rigid with mobility d/t pain vs anticipation of pain with mobility)  Stairs  Stairs:  (up/down 5 steps with HR and mod. indep. with reciprocal pattern to ascend and non-reciprocal pattern to descend)       Extremity/Trunk Assessments:  RUE   RUE : Within Functional Limits  LUE   LUE: Within Functional Limits  RLE   RLE : Within Functional Limits  LLE   LLE : Within Functional Limits    Outcome Measures:     [facility] Basic Mobility  Turning from your back to your side while in a flat bed without using bedrails: None  Moving from lying on your back to sitting on the side of a flat  bed without using bedrails: None  Moving to and from bed to chair (including a wheelchair): None  Standing up from a chair using your arms (e.g. wheelchair or bedside chair): None  To walk in hospital room: None  Climbing 3-5 steps with railing: None  Basic Mobility - Total Score: 24

## 2024-06-05 NOTE — PROGRESS NOTES
"Occupational Therapy    Evaluation/Treatment    Patient Name: Walker Cuenca IV \"Isela\"  MRN: [id number]  : 1970  Today's Date: 24  Time Calculation  Start Time: 939  Stop Time: 1000  Time Calculation (min): 21 min       Assessment:  End of Session Patient Position: Bed, 2 rail up, Alarm on  OT Assessment Results: Decreased ADL status, Decreased endurance, Decreased functional mobility    Plan:  No Skilled OT: No acute OT goals identified  OT Frequency: OT eval only  OT Discharge Recommendations: No further acute OT, No OT needed after discharge  OT Recommended Transfer Status: Independent  OT - OK to Discharge: Yes     Subjective           General:   OT Received On: 24  General  Reason for Referral: ADL impairment  Referred By: Quintin PT/OT eval and tx 24  Past Medical History Relevant to Rehab: recent liver transplant (10/2023) d/t PRESCOTT/ETOH-related cirrhosis, multiple abdominal paracentesis procedures in , former smoker,  HTN, depression, anxiety, C-diff  Family/Caregiver Present: No  Prior to Session Communication: Bedside nurse  General Comment: To ED on  s/p fall on  sustaining closed fxs of multiple ribs on R side; denied head injury. CT chest/abd- acute nondisplaced fx of lateral R 7th rib, posteriolateral R 8th and 9th rib fxs, posterior R 10th rib fx; concern for grade 1 laceration of liver. CT T&L spine (-) acute fxs    Precautions:  Medical Precautions:  (rib fx, discussed splinting with pillows, importance of sitting up and using incentive spirometer)           Pain:  Pain Assessment  Pain Assessment: 0-10  Pain Score:  (pt reports pain with facial grimacing and auditory moaning, but did not rate pain when asked several times)  Pain Type: Acute pain  Pain Location: Rib cage  Pain Orientation: Right  Objective     Cognition:  Overall Cognitive Status: Within Functional Limits             Home Living:  Home Living Comments: son lives with pt, son works 2nd shift, 1 story, 2 " HAKEME, no AD, Tub/shower no DME    Prior Function:  Prior Function Comments: Pt indep wiht ADL, IADLS, drives, reports one fall (cause of current adm)           Activities of Daily Living:   Eating Assistance: Independent  Grooming Assistance: Stand by  Bathing Assistance: Stand by  UE Dressing Assistance: Stand by  LE Dressing Assistance: Stand by  Toileting Assistance with Device: Stand by  Functional Assistance:  (pt  ambulated 200' at modified indep)                         Activity Tolerance:  Endurance:  (limited by pain)           Bed Mobility/Transfers: Bed Mobility  Bed Mobility:  (sup to sit SBA with HOB at 60)  Transfers  Transfer:  (sit to stand from EOB modified indep, increased time due to pain)                Balance:  Static Sitting: good  Dynamic Sitting: good   Static Standing: good  Dynamic Standing: good-         Vision:Vision - Basic Assessment  Current Vision: No visual deficits        Sensation:  Light Touch: No apparent deficits    Strength:  Strength Comments: B UE 4/5 throughout            Extremities: RUE   RUE : Within Functional Limits and LUE   LUE: Within Functional Limits    Outcome Measures: [facility] Daily Activity  Putting on and taking off regular lower body clothing: None  Bathing (including washing, rinsing, drying): None  Putting on and taking off regular upper body clothing: None  Toileting, which includes using toilet, bedpan or urinal: None  Taking care of personal grooming such as brushing teeth: None  Eating Meals: None  Daily Activity - Total Score: 24

## 2024-06-05 NOTE — PROGRESS NOTES
General Surgery Progress Note    Patient: Walker Cuenca IV  Unit/Bed: 910/910-B  YOB: 1970  MRN: [id number]  Acct: [id number]   Admitting Diagnosis: Fall, initial encounter [W19.XXXA]  Closed fracture of multiple ribs of right side, initial encounter [S22.41XA]  Date:  6/2/2024  Hospital Day: 2  Attending: Ok Cunha MD    Complaint:  Chief Complaint   Patient presents with    Fall     Pt states he fell and thinks he may have broken some ribs on Saturday night. Denies head injury, Denies LOC. Patient is a recent liver transplant patient      Subjective  Patient seen and examined this morning. No acute events overnight.  He complains of severe right-sided pain with movement.  He states he is able to walk and perform ADLs, but has a lot of pain with bending over and taking deep breaths.  He has no other complaints and no new pain.  PHYSICAL EXAM:  Physical Exam  Constitutional:       General: He is not in acute distress.  HENT:      Head: Normocephalic and atraumatic.      Mouth/Throat:      Mouth: Mucous membranes are moist.   Eyes:      Conjunctiva/sclera: Conjunctivae normal.   Cardiovascular:      Rate and Rhythm: Normal rate and regular rhythm.   Pulmonary:      Effort: Pulmonary effort is normal. No respiratory distress.      Breath sounds: Normal breath sounds.      Comments: Pulling 1500 on incentive spirometer  Chest:      Chest wall: Tenderness (right) present.   Abdominal:      General: Abdomen is flat. Bowel sounds are normal.      Palpations: Abdomen is soft.      Tenderness: There is abdominal tenderness in the right upper quadrant. There is no guarding.   Musculoskeletal:         General: No swelling.   Skin:     General: Skin is warm and dry.   Neurological:      Mental Status: He is alert.   Psychiatric:         Mood and Affect: Mood normal.         Behavior: Behavior normal.       Vital signs in last 24 hours:  Vitals:    06/05/24 1528   BP: 158/84   Pulse: 69   Resp: 18   Temp:  36.6 °C (97.9 °F)   SpO2: 98%     Intake/Output this shift:    Intake/Output Summary (Last 24 hours) at 6/5/2024 1738  Last data filed at 6/5/2024 1300  Gross per 24 hour   Intake 590 ml   Output --   Net 590 ml      Allergies:  No Known Allergies   Medications:  Scheduled medications  [Held by provider] aspirin, 81 mg, oral, Daily  atorvastatin, 20 mg, oral, Daily  carvedilol, 25 mg, oral, BID  fludrocortisone, 0.1 mg, oral, Daily  insulin lispro, 0-10 Units, subcutaneous, Before meals & nightly  [START ON 6/6/2024] insulin NPH (Isophane), 12 Units, subcutaneous, q24h MIRACLE  levothyroxine, 50 mcg, oral, Daily  lidocaine, 1 patch, transdermal, Daily  methocarbamol, 500 mg, oral, q6h MIRACLE  oxyCODONE, 5 mg, oral, q6h  pantoprazole, 40 mg, oral, Daily before breakfast  sennosides, 2 tablet, oral, BID  tacrolimus, 5 mg, oral, q12h MIRACLE      Continuous medications     PRN medications  PRN medications: dextrose, dextrose, glucagon, glucagon, HYDROmorphone  Labs:  Results for orders placed or performed during the hospital encounter of 06/02/24 (from the past 24 hour(s))   POCT GLUCOSE   Result Value Ref Range    POCT Glucose 216 (H) 74 - 99 mg/dL   CBC   Result Value Ref Range    WBC 4.8 4.4 - 11.3 x10*3/uL    nRBC 0.0 0.0 - 0.0 /100 WBCs    RBC 3.64 (L) 4.50 - 5.90 x10*6/uL    Hemoglobin 10.5 (L) 13.5 - 17.5 g/dL    Hematocrit 30.0 (L) 41.0 - 52.0 %    MCV 82 80 - 100 fL    MCH 28.8 26.0 - 34.0 pg    MCHC 35.0 32.0 - 36.0 g/dL    RDW 13.1 11.5 - 14.5 %    Platelets 116 (L) 150 - 450 x10*3/uL   Comprehensive Metabolic Panel   Result Value Ref Range    Glucose 177 (H) 74 - 99 mg/dL    Sodium 134 (L) 136 - 145 mmol/L    Potassium 5.0 3.5 - 5.3 mmol/L    Chloride 110 (H) 98 - 107 mmol/L    Bicarbonate 21 21 - 32 mmol/L    Anion Gap 8 (L) 10 - 20 mmol/L    Urea Nitrogen 40 (H) 6 - 23 mg/dL    Creatinine 1.49 (H) 0.50 - 1.30 mg/dL    eGFR 55 (L) >60 mL/min/1.73m*2    Calcium 9.1 8.6 - 10.3 mg/dL    Albumin 4.0 3.4 - 5.0 g/dL     Alkaline Phosphatase 96 33 - 120 U/L    Total Protein 6.3 (L) 6.4 - 8.2 g/dL    AST 13 9 - 39 U/L    Bilirubin, Total 0.9 0.0 - 1.2 mg/dL    ALT 17 10 - 52 U/L   Tacrolimus level   Result Value Ref Range    Tacrolimus  22.2 (HH) <=15.0 ng/mL   SST TOP   Result Value Ref Range    Extra Tube Hold for add-ons.    POCT GLUCOSE   Result Value Ref Range    POCT Glucose 175 (H) 74 - 99 mg/dL   POCT GLUCOSE   Result Value Ref Range    POCT Glucose 244 (H) 74 - 99 mg/dL   POCT GLUCOSE   Result Value Ref Range    POCT Glucose 135 (H) 74 - 99 mg/dL      Imaging:  XR chest 1 view    Result Date: 6/5/2024  Interpreted By:  Jr Leggett, STUDY: XR CHEST 1 VIEW;  6/5/2024 2:32 pm   INDICATION: Signs/Symptoms:Right rib fractures with pain, check for effusion or fluid accumulation.   COMPARISON: CT Chest, chest 2 June 2024; portable chest 13 September 2023   ACCESSION NUMBER(S): [id number]   ORDERING CLINICIAN: DANIEL RAY   TECHNIQUE: Single frontal view of the chest; Portable technique   FINDINGS:   The cardiomediastinal silhouette is unchanged   No consolidative lung opacity   No edema / failure   No large pleural effusion or demonstrable pneumothorax       No acute disease in the chest   There was no hemothorax or other abnormal pleural fluid on either side on the CT 2 June 2024   If detection of small quantity of pleural fluid is needed, a proper two view chest radiograph would be needed. Pleural effusions of less than 200 mL are usually occult on a portable frontal chest radiograph   MACRO: None   Signed by: Jr Leggett 6/5/2024 2:45 PM Dictation workstation:   [id number]    Assessment  Status post mechanical fall grade 1 liver laceration right seventh through 10th rib fracture    No acute events overnight.  The patient complains of severe right-sided pain with movement.  On exam, he has right-sided chest wall and right upper quadrant tenderness to palpation.  Lungs are clear bilaterally.  He is pulling 1500 on incentive  spirometry.  Hemoglobin 10.5, stable.  Vital signs stable.    Plan  -Carb diet  -Discontinue IVF  -PT/OT consulted  -Chest x-ray ordered and was negative for any acute findings  -Pain control  -Nausea control  -DVT prophylaxis-Lovenox 40 mg subcutaneous daily  -Pulmonary toileting   -Encourage ambulation    Further recommendations per Dr. Serena Johnson PA-C    Time spent  35  minutes obtaining labs, imaging, recommendations, interview, assessment, examination, medication review/ordering, and EMR review.    Plan of care was discussed extensively with patient. Patient verbalized understanding through teach back method. All questions and concerns addressed upon examination.     Of note, this documentation is completed using the Dragon Dictation system (voice recognition software). There may be spelling and/or grammatical errors that were not corrected prior to final submission.

## 2024-06-06 VITALS
BODY MASS INDEX: 28.14 KG/M2 | HEART RATE: 72 BPM | DIASTOLIC BLOOD PRESSURE: 85 MMHG | HEIGHT: 69 IN | TEMPERATURE: 98.2 F | SYSTOLIC BLOOD PRESSURE: 144 MMHG | OXYGEN SATURATION: 95 % | WEIGHT: 190 LBS | RESPIRATION RATE: 18 BRPM

## 2024-06-06 LAB
ALBUMIN SERPL BCP-MCNC: 4.1 G/DL (ref 3.4–5)
ALP SERPL-CCNC: 108 U/L (ref 33–120)
ALT SERPL W P-5'-P-CCNC: 17 U/L (ref 10–52)
ANION GAP SERPL CALC-SCNC: 10 MMOL/L (ref 10–20)
AST SERPL W P-5'-P-CCNC: 16 U/L (ref 9–39)
BILIRUB SERPL-MCNC: 0.9 MG/DL (ref 0–1.2)
BUN SERPL-MCNC: 38 MG/DL (ref 6–23)
CALCIUM SERPL-MCNC: 9.7 MG/DL (ref 8.6–10.3)
CHLORIDE SERPL-SCNC: 108 MMOL/L (ref 98–107)
CO2 SERPL-SCNC: 22 MMOL/L (ref 21–32)
CREAT SERPL-MCNC: 1.59 MG/DL (ref 0.5–1.3)
EGFRCR SERPLBLD CKD-EPI 2021: 51 ML/MIN/1.73M*2
ERYTHROCYTE [DISTWIDTH] IN BLOOD BY AUTOMATED COUNT: 13.2 % (ref 11.5–14.5)
GLUCOSE BLD MANUAL STRIP-MCNC: 170 MG/DL (ref 74–99)
GLUCOSE SERPL-MCNC: 156 MG/DL (ref 74–99)
HCT VFR BLD AUTO: 31.2 % (ref 41–52)
HGB BLD-MCNC: 10.9 G/DL (ref 13.5–17.5)
HOLD SPECIMEN: NORMAL
MCH RBC QN AUTO: 28.5 PG (ref 26–34)
MCHC RBC AUTO-ENTMCNC: 34.9 G/DL (ref 32–36)
MCV RBC AUTO: 82 FL (ref 80–100)
NRBC BLD-RTO: 0 /100 WBCS (ref 0–0)
PLATELET # BLD AUTO: 130 X10*3/UL (ref 150–450)
POTASSIUM SERPL-SCNC: 5.1 MMOL/L (ref 3.5–5.3)
PROT SERPL-MCNC: 6.6 G/DL (ref 6.4–8.2)
RBC # BLD AUTO: 3.83 X10*6/UL (ref 4.5–5.9)
SODIUM SERPL-SCNC: 135 MMOL/L (ref 136–145)
TACROLIMUS BLD-MCNC: 6.1 NG/ML
WBC # BLD AUTO: 5.1 X10*3/UL (ref 4.4–11.3)

## 2024-06-06 PROCEDURE — 2500000004 HC RX 250 GENERAL PHARMACY W/ HCPCS (ALT 636 FOR OP/ED)

## 2024-06-06 PROCEDURE — 36415 COLL VENOUS BLD VENIPUNCTURE: CPT | Performed by: STUDENT IN AN ORGANIZED HEALTH CARE EDUCATION/TRAINING PROGRAM

## 2024-06-06 PROCEDURE — 2500000002 HC RX 250 W HCPCS SELF ADMINISTERED DRUGS (ALT 637 FOR MEDICARE OP, ALT 636 FOR OP/ED): Performed by: STUDENT IN AN ORGANIZED HEALTH CARE EDUCATION/TRAINING PROGRAM

## 2024-06-06 PROCEDURE — 80197 ASSAY OF TACROLIMUS: CPT | Mod: ELYLAB | Performed by: STUDENT IN AN ORGANIZED HEALTH CARE EDUCATION/TRAINING PROGRAM

## 2024-06-06 PROCEDURE — 82947 ASSAY GLUCOSE BLOOD QUANT: CPT

## 2024-06-06 PROCEDURE — 2500000001 HC RX 250 WO HCPCS SELF ADMINISTERED DRUGS (ALT 637 FOR MEDICARE OP)

## 2024-06-06 PROCEDURE — 80053 COMPREHEN METABOLIC PANEL: CPT | Performed by: REGISTERED NURSE

## 2024-06-06 PROCEDURE — 85027 COMPLETE CBC AUTOMATED: CPT | Performed by: REGISTERED NURSE

## 2024-06-06 RX ORDER — METHOCARBAMOL 500 MG/1
500 TABLET, FILM COATED ORAL 4 TIMES DAILY
Qty: 28 TABLET | Refills: 0 | Status: SHIPPED | OUTPATIENT
Start: 2024-06-06 | End: 2024-06-13

## 2024-06-06 RX ORDER — OXYCODONE HYDROCHLORIDE 5 MG/1
5 TABLET ORAL EVERY 6 HOURS PRN
Qty: 20 TABLET | Refills: 0 | Status: SHIPPED | OUTPATIENT
Start: 2024-06-06 | End: 2024-06-13

## 2024-06-06 RX ORDER — NAPROXEN SODIUM 220 MG/1
81 TABLET, FILM COATED ORAL
Status: DISCONTINUED | OUTPATIENT
Start: 2024-06-06 | End: 2024-06-06 | Stop reason: HOSPADM

## 2024-06-06 RX ADMIN — TACROLIMUS 5 MG: 1 CAPSULE ORAL at 10:45

## 2024-06-06 RX ADMIN — CARVEDILOL 25 MG: 12.5 TABLET, FILM COATED ORAL at 09:44

## 2024-06-06 RX ADMIN — ASPIRIN 81 MG: 81 TABLET, CHEWABLE ORAL at 10:20

## 2024-06-06 RX ADMIN — INSULIN HUMAN 12 UNITS: 100 INJECTION, SUSPENSION SUBCUTANEOUS at 09:45

## 2024-06-06 RX ADMIN — PANTOPRAZOLE SODIUM 40 MG: 40 TABLET, DELAYED RELEASE ORAL at 06:38

## 2024-06-06 RX ADMIN — INSULIN LISPRO 2 UNITS: 100 INJECTION, SOLUTION INTRAVENOUS; SUBCUTANEOUS at 06:38

## 2024-06-06 RX ADMIN — OXYCODONE HYDROCHLORIDE 5 MG: 5 TABLET ORAL at 06:38

## 2024-06-06 RX ADMIN — ATORVASTATIN CALCIUM 20 MG: 20 TABLET, FILM COATED ORAL at 09:44

## 2024-06-06 RX ADMIN — METHOCARBAMOL TABLETS 500 MG: 500 TABLET, COATED ORAL at 06:38

## 2024-06-06 RX ADMIN — LEVOTHYROXINE SODIUM 50 MCG: 50 TABLET ORAL at 09:44

## 2024-06-06 RX ADMIN — FLUDROCORTISONE ACETATE 0.1 MG: 0.1 TABLET ORAL at 06:38

## 2024-06-06 ASSESSMENT — COGNITIVE AND FUNCTIONAL STATUS - GENERAL
DAILY ACTIVITIY SCORE: 24
MOBILITY SCORE: 24

## 2024-06-06 ASSESSMENT — PAIN SCALES - GENERAL
PAINLEVEL_OUTOF10: 3
PAINLEVEL_OUTOF10: 0 - NO PAIN

## 2024-06-06 ASSESSMENT — PAIN - FUNCTIONAL ASSESSMENT
PAIN_FUNCTIONAL_ASSESSMENT: 0-10
PAIN_FUNCTIONAL_ASSESSMENT: 0-10

## 2024-06-06 NOTE — DISCHARGE SUMMARY
Discharge Diagnosis  Closed fracture of multiple ribs of right side, initial encounter  Liver laceration    Issues Requiring Follow-Up  Follow-up with [facility] transplant clinic within 2 weeks for liver laceration and rib fractures    Test Results Pending At Discharge  Pending Labs       Order Current Status    Tacrolimus level In process            Hospital Course   53-year-old male presented to the ER on 6/2/2024 for right-sided chest wall and back pain after a mechanical fall the day before.  CT chest abdomen pelvis showed acute nondisplaced fractures of the right 7 to 10th ribs and concern for grade 1 liver laceration.  He was admitted to the hospital by the trauma service for further evaluation and management.  He was n.p.o. for 24 hours, strict bedrest for 24 hours, received CBC every 6 hours for 24 hours, and pain was controlled with opioids, muscle relaxer, and lidocaine patches.  Medicine was consulted for diabetes management and adjusted insulin during hospitalization.  Diet was advanced.  PT/OT evaluated the patient and did not recommend any outpatient therapy.  Incentive spirometry was encouraged.  On 6/5/2024 he was still having severe pain with movement and inspiration, chest x-ray was ordered and did not have any concerning findings.  On 6/6/2024 pain was better controlled with oxycodone and muscle relaxer.  He was discharged home in stable condition.  He was given prescriptions for oxycodone and Robaxin.  He should continue to use incentive spirometer.  He should follow-up with [facility] transplant clinic.    Pertinent Physical Exam At Time of Discharge  Physical Exam  Constitutional:       General: He is not in acute distress.  HENT:      Head: Normocephalic and atraumatic.      Mouth/Throat:      Mouth: Mucous membranes are moist.   Eyes:      Conjunctiva/sclera: Conjunctivae normal.   Cardiovascular:      Rate and Rhythm: Normal rate and regular rhythm.   Pulmonary:      Effort: Pulmonary effort is normal.  No respiratory distress.      Breath sounds: Normal breath sounds.      Comments: Pulling 1500 on incentive spirometer.  Chest:      Chest wall: Tenderness (right-sided) present.   Abdominal:      General: Abdomen is flat.      Palpations: Abdomen is soft.      Tenderness: There is abdominal tenderness (mild, RUQ). There is no guarding.   Musculoskeletal:         General: No swelling.   Skin:     General: Skin is warm and dry.   Neurological:      Mental Status: He is alert.   Psychiatric:         Mood and Affect: Mood normal.         Behavior: Behavior normal.         Home Medications     Medication List      START taking these medications     methocarbamol 500 mg tablet; Commonly known as: Robaxin; Take 1 tablet   (500 mg) by mouth 4 times a day for 7 days.   oxyCODONE 5 mg immediate release tablet; Commonly known as: Roxicodone;   Take 1 tablet (5 mg) by mouth every 6 hours if needed for severe pain (7 -   10) or moderate pain (4 - 6) for up to 7 days.     CHANGE how you take these medications     levothyroxine 50 mcg tablet; Commonly known as: Synthroid, Levoxyl; Take   1 tablet (50 mcg) by mouth once daily.; What changed: when to take this     CONTINUE taking these medications     albuterol 90 mcg/actuation inhaler   aspirin 81 mg chewable tablet   atorvastatin 20 mg tablet; Commonly known as: Lipitor; Take 1 tablet (20   mg) by mouth once daily.   carvedilol 25 mg tablet; Commonly known as: Coreg; Take 1 tablet (25 mg)   by mouth 2 times a day with meals.   fludrocortisone 0.1 mg tablet; Commonly known as: Florinef   HumuLIN N NPH Insulin KwikPen 100 unit/mL (3 mL) injection; Generic   drug: insulin NPH (Isophane)   Nitrostat 0.4 mg SL tablet; Generic drug: nitroglycerin   OneTouch Ultra Test strip; Generic drug: blood sugar diagnostic; 1 strip   2 times a day.   pantoprazole 40 mg EC tablet; Commonly known as: ProtoNix; Take 1 tablet   (40 mg) by mouth once daily in the morning. Take before meals.    sulfamethoxazole-trimethoprim 800-160 mg tablet; Commonly known as:   Bactrim DS   tacrolimus 1 mg capsule; Commonly known as: Prograf   Veltassa 8.4 gram powder in packet; Generic drug: patiromer calcium   sorbitex       Outpatient Follow-Up  Future Appointments   Date Time Provider Department Center   6/24/2024  4:30 PM Jackson Damon MD [id number] [place]       Karin Johnson PA-C

## 2024-06-06 NOTE — CARE PLAN
The patient's goals for the shift include NA     The clinical goals for the shift include pain control/safety

## 2024-06-06 NOTE — CARE PLAN
The patient's goals for the shift include  pain management    The clinical goals for the shift include pain management    Over the shift, the patient did not make progress toward the following goals. Barriers to progression include acute pain. Recommendations to address these barriers include administer medications as ordered.      Problem: Pain - Adult  Goal: Verbalizes/displays adequate comfort level or baseline comfort level  Outcome: Progressing     Problem: Safety - Adult  Goal: Free from fall injury  Outcome: Progressing     Problem: Discharge Planning  Goal: Discharge to home or other facility with appropriate resources  Outcome: Progressing     Problem: Chronic Conditions and Co-morbidities  Goal: Patient's chronic conditions and co-morbidity symptoms are monitored and maintained or improved  Outcome: Progressing

## 2024-06-07 ENCOUNTER — PATIENT OUTREACH (OUTPATIENT)
Dept: CARE COORDINATION | Facility: CLINIC | Age: 54
End: 2024-06-07
Payer: COMMERCIAL

## 2024-06-07 NOTE — PROGRESS NOTES
Discharge Facility:[facility]  Discharge Diagnosis:Closed fracture of multiple ribs of right side  Admission Date:6/3/24  Discharge Date:6/6/24    PCP Appointment Date:Task sent to office  Specialist Appointment Date: N/A  Hospital Encounter and Summary: Linked   See discharge assessment below for further details    Medications  Medications reviewed with patient/caregiver?: Yes (6/7/2024 10:04 AM)  Is the patient having any side effects they believe may be caused by any medication additions or changes?: No (6/7/2024 10:04 AM)  Does the patient have all medications ordered at discharge?: Yes (6/7/2024 10:04 AM)  Care Management Interventions: Provided patient education (6/7/2024 10:04 AM)  Prescription Comments: Robaxin, Oxycodone (6/7/2024 10:04 AM)  Is the patient taking all medications as directed (includes completed medication regime)?: Yes (6/7/2024 10:04 AM)  Care Management Interventions: Provided patient education (6/7/2024 10:04 AM)  Medication Comments: Patient has discharge medication but states they are not helping. task sent to pcp (6/7/2024 10:04 AM)    Appointments  Does the patient have a primary care provider?: Yes (6/7/2024 10:04 AM)  Care Management Interventions: Advised patient to make appointment (6/7/2024 10:04 AM)  Has the patient kept scheduled appointments due by today?: Yes (6/7/2024 10:04 AM)    Self Management  Has home health visited the patient within 72 hours of discharge?: Not applicable (6/7/2024 10:04 AM)  What Durable Medical Equipment (DME) was ordered?: N/A (6/7/2024 10:04 AM)    Patient Teaching  Does the patient have access to their discharge instructions?: Yes (6/7/2024 10:04 AM)  Care Management Interventions: Reviewed instructions with patient (6/7/2024 10:04 AM)  What is the patient's perception of their health status since discharge?: Same (6/7/2024 10:04 AM)  Is the patient/caregiver able to teach back the hierarchy of who to call/visit for symptoms/problems? PCP,  Specialist, Home Health nurse, Urgent Care, ED, 911: Yes (6/7/2024 10:04 AM)    Wrap Up  Wrap Up Additional Comments: Patient still having pain. Task sent to pcp. (6/7/2024 10:04 AM)

## 2024-06-12 ENCOUNTER — OFFICE VISIT (OUTPATIENT)
Dept: PRIMARY CARE | Facility: CLINIC | Age: 54
End: 2024-06-12
Payer: COMMERCIAL

## 2024-06-12 VITALS
DIASTOLIC BLOOD PRESSURE: 83 MMHG | SYSTOLIC BLOOD PRESSURE: 135 MMHG | TEMPERATURE: 96 F | BODY MASS INDEX: 29.09 KG/M2 | WEIGHT: 197 LBS | HEART RATE: 77 BPM

## 2024-06-12 DIAGNOSIS — S22.41XS CLOSED FRACTURE OF MULTIPLE RIBS OF RIGHT SIDE, SEQUELA: Primary | ICD-10-CM

## 2024-06-12 DIAGNOSIS — Z94.4 LIVER TRANSPLANTED (MULTI): ICD-10-CM

## 2024-06-12 DIAGNOSIS — S36.118S: ICD-10-CM

## 2024-06-12 DIAGNOSIS — R07.9 CHEST PAIN, UNSPECIFIED TYPE: ICD-10-CM

## 2024-06-12 PROCEDURE — 99496 TRANSJ CARE MGMT HIGH F2F 7D: CPT | Performed by: INTERNAL MEDICINE

## 2024-06-12 PROCEDURE — 3008F BODY MASS INDEX DOCD: CPT | Performed by: INTERNAL MEDICINE

## 2024-06-12 PROCEDURE — 3079F DIAST BP 80-89 MM HG: CPT | Performed by: INTERNAL MEDICINE

## 2024-06-12 PROCEDURE — 1036F TOBACCO NON-USER: CPT | Performed by: INTERNAL MEDICINE

## 2024-06-12 PROCEDURE — 3075F SYST BP GE 130 - 139MM HG: CPT | Performed by: INTERNAL MEDICINE

## 2024-06-12 ASSESSMENT — ENCOUNTER SYMPTOMS
CONSTITUTIONAL NEGATIVE: 1
EYES NEGATIVE: 1
APNEA: 0
DIZZINESS: 0
COUGH: 0
ARTHRALGIAS: 0
SHORTNESS OF BREATH: 1
GASTROINTESTINAL NEGATIVE: 1

## 2024-06-12 NOTE — PROGRESS NOTES
"Patient: Walker Cuenca IV  : 1970  PCP: Jackson Damon MD  MRN: [id number]  Program: Transitional Care Management  Status: Enrolled  Effective Dates: 2024 - present  Responsible Staff: Estela Gomez LPN  Social Determinants to be Addressed: Physical Activity, Social Connections, Stress, Tobacco Use         Walker Cuenca IV is a 53 y.o. male presenting today for follow-up after being discharged from the hospital 6 days ago. The main problem requiring admission was rib fractures. The discharge summary and/or Transitional Care Management documentation was reviewed. Medication reconciliation was performed as indicated via the \"Jr as Reviewed\" timestamp.   Patient fell, it was mechanical fall, there was 3 rib fracture on the right side, there was question about the liver tear actually there was no tear, patient was closely followed with a CBC, there was no significant blood loss, hemoglobin did not drop much, he was discharged as there was no liver tear or any subcapsular hematoma, patient is here as he needs paperwork to return back to work, transitional care related visit care was provided, he is a liver transplant patient, he has a regular laboratories done because of liver transplant status at [facility].  He was almost on the verge of fatality when he has a cirrhosis of liver with ascites and end-stage or decompensated liver disease when repeated attempts were made to get a liver transplant and finally he got the liver transplant at [facility] and his life was saved.  He looks much better besides pain and discomfort.  Walker Cuenca IV was contacted by Transitional Care Management services two days after his discharge. This encounter and supporting documentation was reviewed.    Review of Systems   Constitutional: Negative.    Eyes: Negative.    Respiratory:  Positive for shortness of breath. Negative for apnea and cough.    Cardiovascular:  Positive for chest pain.   Gastrointestinal: Negative.    Musculoskeletal:  " Negative for arthralgias.   Skin: Negative.    Neurological:  Negative for dizziness.       /83 (BP Location: Right arm, Patient Position: Sitting, BP Cuff Size: Adult)   Pulse 77   Temp 35.6 °C (96 °F) (Temporal)   Wt 89.4 kg (197 lb)   BMI 29.09 kg/m²     Physical Exam  Vitals reviewed.   Constitutional:       Appearance: Normal appearance. He is overweight.   HENT:      Head: Normocephalic and atraumatic.   Eyes:      Conjunctiva/sclera: Conjunctivae normal.   Cardiovascular:      Rate and Rhythm: Normal rate and regular rhythm.      Comments: Tenderness at rt sided chest wall area  Pulmonary:      Effort: Pulmonary effort is normal.      Breath sounds: Normal breath sounds.   Abdominal:      Palpations: Abdomen is soft.      Tenderness: There is abdominal tenderness.   Musculoskeletal:         General: Normal range of motion.      Cervical back: Normal range of motion.   Skin:     General: Skin is warm and dry.      Findings: Bruising present.   Neurological:      General: No focal deficit present.       The complexity of medical decision making for this patient's transitional care is high.    Assessment/Plan   Problem List Items Addressed This Visit             ICD-10-CM    Liver transplanted (Multi) Z94.4     Other Visit Diagnoses         Codes    Closed fracture of multiple ribs of right side, sequela    -  Primary S22.41XS    Capsular tear of liver, sequela     S36.118S    Chest pain, unspecified type     R07.9        Recent hospitalization data and information reviewed, all reports, labs, radiological investigations and consultations and follow ups reviewed during this visit. Pt is doing well, precautions to be taken in the future for acute ailments or acute illness and change of condition are discussed, will continue to have close follow up, medication list was reviewed and updated and necessary changes were applied.  Area is tender, area is showing bruises, there is a closed fracture, there is  no pulmonary contusion, there is no hemothorax, there is no capsular tear of the liver as initially they thought, transplanted liver appears to be well, chest pain is musculoskeletal secondary to rib fracture.  He remains on appropriate antiviral and antibiotic prophylaxis.  He has been readings are stable, he can return back to work from next Monday not more than 5 pound weight lifting restriction for 4 weeks followed by usual restriction as he is going for hernia surgery, all the hospitalization data and information were reviewed medications are reviewed, immunosuppression medications are reviewed, hemodynamics are stable, abdomen is not showing profound distention, he seems to be doing well and we hope that there is no consequences of this blunt trauma to the right side of the chest wall with the rib fracture and rib injury.

## 2024-06-14 DIAGNOSIS — J45.20 MILD INTERMITTENT ASTHMA WITHOUT COMPLICATION (HHS-HCC): ICD-10-CM

## 2024-06-14 DIAGNOSIS — E11.9 CONTROLLED TYPE 2 DIABETES MELLITUS WITHOUT COMPLICATION, WITHOUT LONG-TERM CURRENT USE OF INSULIN (MULTI): Primary | ICD-10-CM

## 2024-06-21 ENCOUNTER — PATIENT OUTREACH (OUTPATIENT)
Dept: CARE COORDINATION | Facility: CLINIC | Age: 54
End: 2024-06-21
Payer: COMMERCIAL

## 2024-06-21 NOTE — PROGRESS NOTES
Call regarding appt. with PCP on 6/12/24 after hospitalization.  At time of outreach call the patient feels as if their condition has improved since last visit.  Reviewed the PCP appointment with the pt and addressed any questions or concerns.    normal...

## 2024-06-24 ENCOUNTER — APPOINTMENT (OUTPATIENT)
Dept: PRIMARY CARE | Facility: CLINIC | Age: 54
End: 2024-06-24
Payer: COMMERCIAL

## 2024-07-19 ENCOUNTER — PATIENT OUTREACH (OUTPATIENT)
Dept: CARE COORDINATION | Facility: CLINIC | Age: 54
End: 2024-07-19
Payer: COMMERCIAL

## 2024-07-19 NOTE — PROGRESS NOTES
Unable to reach patient for discharge follow up call.   LVM with call back number for patient to call if needed   If no voicemail available call attempts x 2 were made to contact the patient to assist with any questions or concerns patient may have.   
WDL

## 2024-07-30 ENCOUNTER — APPOINTMENT (OUTPATIENT)
Dept: PRIMARY CARE | Facility: CLINIC | Age: 54
End: 2024-07-30
Payer: COMMERCIAL

## 2024-08-08 ENCOUNTER — APPOINTMENT (OUTPATIENT)
Dept: PRIMARY CARE | Facility: CLINIC | Age: 54
End: 2024-08-08
Payer: COMMERCIAL

## 2024-08-16 ENCOUNTER — PATIENT OUTREACH (OUTPATIENT)
Dept: CARE COORDINATION | Facility: CLINIC | Age: 54
End: 2024-08-16
Payer: COMMERCIAL

## 2024-08-19 ENCOUNTER — APPOINTMENT (OUTPATIENT)
Dept: PRIMARY CARE | Facility: CLINIC | Age: 54
End: 2024-08-19
Payer: COMMERCIAL

## 2024-09-10 ENCOUNTER — APPOINTMENT (OUTPATIENT)
Dept: PRIMARY CARE | Facility: CLINIC | Age: 54
End: 2024-09-10
Payer: COMMERCIAL

## 2024-09-12 ENCOUNTER — APPOINTMENT (OUTPATIENT)
Dept: PRIMARY CARE | Facility: CLINIC | Age: 54
End: 2024-09-12
Payer: COMMERCIAL

## 2024-09-12 VITALS
TEMPERATURE: 96.8 F | HEART RATE: 66 BPM | HEIGHT: 69 IN | BODY MASS INDEX: 30.66 KG/M2 | SYSTOLIC BLOOD PRESSURE: 140 MMHG | DIASTOLIC BLOOD PRESSURE: 80 MMHG | WEIGHT: 207 LBS

## 2024-09-12 DIAGNOSIS — E11.9 CONTROLLED TYPE 2 DIABETES MELLITUS WITHOUT COMPLICATION, WITHOUT LONG-TERM CURRENT USE OF INSULIN (MULTI): ICD-10-CM

## 2024-09-12 DIAGNOSIS — Z94.4 LIVER TRANSPLANTED (MULTI): ICD-10-CM

## 2024-09-12 DIAGNOSIS — I25.10 ATHEROSCLEROSIS OF NATIVE CORONARY ARTERY OF NATIVE HEART WITHOUT ANGINA PECTORIS: Primary | ICD-10-CM

## 2024-09-12 DIAGNOSIS — I10 ESSENTIAL HYPERTENSION: ICD-10-CM

## 2024-09-12 DIAGNOSIS — Z23 INFLUENZA VACCINE ADMINISTERED: ICD-10-CM

## 2024-09-12 DIAGNOSIS — D84.9 IMMUNOSUPPRESSED STATUS (MULTI): ICD-10-CM

## 2024-09-12 PROCEDURE — 90656 IIV3 VACC NO PRSV 0.5 ML IM: CPT | Performed by: INTERNAL MEDICINE

## 2024-09-12 PROCEDURE — 3079F DIAST BP 80-89 MM HG: CPT | Performed by: INTERNAL MEDICINE

## 2024-09-12 PROCEDURE — 3008F BODY MASS INDEX DOCD: CPT | Performed by: INTERNAL MEDICINE

## 2024-09-12 PROCEDURE — 99213 OFFICE O/P EST LOW 20 MIN: CPT | Performed by: INTERNAL MEDICINE

## 2024-09-12 PROCEDURE — 3077F SYST BP >= 140 MM HG: CPT | Performed by: INTERNAL MEDICINE

## 2024-09-12 PROCEDURE — 1036F TOBACCO NON-USER: CPT | Performed by: INTERNAL MEDICINE

## 2024-09-12 PROCEDURE — 90471 IMMUNIZATION ADMIN: CPT | Performed by: INTERNAL MEDICINE

## 2024-09-12 RX ORDER — INSULIN GLARGINE 100 [IU]/ML
16 INJECTION, SOLUTION SUBCUTANEOUS NIGHTLY
Qty: 3 ML | Refills: 6 | Status: SHIPPED | OUTPATIENT
Start: 2024-09-12 | End: 2025-09-12

## 2024-09-12 ASSESSMENT — ENCOUNTER SYMPTOMS
FEVER: 0
DIARRHEA: 0
ABDOMINAL PAIN: 0
HEMATOCHEZIA: 0
DIZZINESS: 0
WEAKNESS: 0
FATIGUE: 1
CARDIOVASCULAR NEGATIVE: 1
NAUSEA: 0
RESPIRATORY NEGATIVE: 1
MYALGIAS: 0
ARTHRALGIAS: 0

## 2024-09-12 NOTE — PROGRESS NOTES
Subjective   Patient ID: Walker Cuenca IV is a 54 y.o. male who presents for Follow-up (2 mo fu).  gi    GI Problem  The primary symptoms include fatigue. Primary symptoms do not include fever, abdominal pain, nausea, diarrhea, hematochezia, myalgias or arthralgias. Primary symptoms comment: Liver transplant. The onset was gradual. The problem has been gradually improving.   Significant associated medical issues include liver disease. Associated medical issues do not include gallstones.     Heart Problem  This is a chronic problem. The current episode started more than 1 year ago. The problem occurs rarely. The problem has been resolved. Pertinent negatives include no abdominal pain, anorexia, arthralgias, congestion, headaches, nausea, numbness or weakness. The treatment provided significant relief. Has occasional chest pains.  Diabetes  He presents for his follow-up diabetic visit. He has type 2 diabetes mellitus. His disease course has been stable. Pertinent negatives for hypoglycemia include no dizziness or headaches. There are no diabetic associated symptoms. Pertinent negatives for diabetes include no weakness. Symptoms are stable. Diabetic complications include heart disease. Risk factors for coronary artery disease include diabetes mellitus. There is no change in his home blood glucose trend. An ACE inhibitor/angiotensin II receptor blocker is not being taken.   Past Medical History  Past Medical History:   Diagnosis Date    GERD (gastroesophageal reflux disease)        Social History  Social History     Tobacco Use    Smoking status: Former     Current packs/day: 0.00     Types: Cigarettes     Quit date: 2010     Years since quittin.7    Smokeless tobacco: Never   Vaping Use    Vaping status: Never Used   Substance Use Topics    Alcohol use: Not Currently    Drug use: Never       Family History   No family history on file.    Allergies:  No Known Allergies     Outpatient Medications:  Current  "Outpatient Medications   Medication Instructions    albuterol 90 mcg/actuation inhaler USE 1-2 PUFFS EVERY 4 HOURS AS NEEDED    aspirin 81 mg, oral, Daily RT    atorvastatin (LIPITOR) 20 mg, oral, Daily    carvedilol (COREG) 25 mg, oral, 2 times daily (morning and late afternoon)    fludrocortisone (FLORINEF) 0.1 mg, oral, Daily RT    levothyroxine (SYNTHROID, LEVOXYL) 50 mcg, oral, Daily    Nitrostat 0.4 mg SL tablet sublingual    OneTouch Ultra Test strip 1 strip, miscellaneous, 2 times daily    pantoprazole (PROTONIX) 40 mg, oral, Daily before breakfast    sulfamethoxazole-trimethoprim (Bactrim DS) 800-160 mg tablet 1 tablet, oral, 3 times weekly, Mondays, Wednesdays and Fridays    tacrolimus (PROGRAF) 5 mg, oral, 2 times daily    Veltassa 8.4 g, oral, Daily RT        Review of Systems   Constitutional:  Positive for fatigue. Negative for fever.   Respiratory: Negative.     Cardiovascular: Negative.    Gastrointestinal:  Negative for abdominal pain, diarrhea, hematochezia and nausea.   Musculoskeletal:  Negative for arthralgias and myalgias.   Neurological:  Negative for dizziness and weakness.         Objective       Physical Exam  Vitals reviewed.   Constitutional:       Appearance: Normal appearance. He is obese.   HENT:      Head: Normocephalic.   Eyes:      Conjunctiva/sclera: Conjunctivae normal.   Cardiovascular:      Rate and Rhythm: Normal rate and regular rhythm.      Pulses: Normal pulses.   Pulmonary:      Breath sounds: Normal breath sounds.   Abdominal:      Palpations: Abdomen is soft.   Musculoskeletal:         General: Normal range of motion.      Cervical back: Neck supple.   Skin:     General: Skin is warm and dry.   Neurological:      General: No focal deficit present.   Psychiatric:         Mood and Affect: Mood normal.     /80 (BP Location: Right arm, Patient Position: Sitting, BP Cuff Size: Adult)   Pulse 66   Temp 36 °C (96.8 °F) (Temporal)   Ht 1.753 m (5' 9\")   Wt 93.9 kg (207 " lb)   BMI 30.57 kg/m²      Assessment/Plan   Problem List Items Addressed This Visit       Atherosclerotic heart disease of native coronary artery without angina pectoris - Primary    Controlled type 2 diabetes mellitus without complication, without long-term current use of insulin (Multi)    Essential hypertension    Liver transplanted (Multi)    Immunosuppressed status (Multi)   He continued to have a problem with the biliary tree or bile duct, they have to do repeat the ERCP and stenting, the transplanted liver biliary duct is smaller in caliber.  He continued to have a hyperkalemia, he is on Veltassa, fludrocortisone is to be taken off, fludrocortisone usually cause hypokalemia, NPH insulin has not been taken by him on a regular basis discontinue it and replaced with the Lantus 16 units so that he can take it at night.  Anemia is not a problem, thrombocytopenia is not significant.  After having liver transplant actually he has a new life I told him repeatedly.  Electrolytes were reviewed, he remains on tacrolimus and appropriate prophylactic regimen being transplanted from liver.  He stays on statin, BP readings are stable, GGT was reviewed, all the report and information from [facility] were reviewed, flu vaccine was suggested and given, follow-up in 3 months.

## 2024-09-13 DIAGNOSIS — E11.9 CONTROLLED TYPE 2 DIABETES MELLITUS WITHOUT COMPLICATION, WITHOUT LONG-TERM CURRENT USE OF INSULIN (MULTI): ICD-10-CM

## 2024-09-21 DIAGNOSIS — I10 ESSENTIAL HYPERTENSION: ICD-10-CM

## 2024-09-21 RX ORDER — CARVEDILOL 25 MG/1
25 TABLET ORAL
Qty: 90 TABLET | Refills: 0 | Status: SHIPPED | OUTPATIENT
Start: 2024-09-21

## 2024-09-28 NOTE — TELEPHONE ENCOUNTER
NEVER GOT HIS LABS DONE - NEED THOSE DONE! NEEDS F/U APPT Return in about 3 months (around 7/23/2020) for Hypertension, Diabetes, Hyperlipidemia. right index finger pain

## 2024-11-09 DIAGNOSIS — I10 ESSENTIAL HYPERTENSION: ICD-10-CM

## 2024-11-09 RX ORDER — CARVEDILOL 25 MG/1
25 TABLET ORAL
Qty: 90 TABLET | Refills: 0 | Status: SHIPPED | OUTPATIENT
Start: 2024-11-09

## 2024-11-18 DIAGNOSIS — K21.9 GASTROESOPHAGEAL REFLUX DISEASE WITHOUT ESOPHAGITIS: ICD-10-CM

## 2024-11-18 RX ORDER — PANTOPRAZOLE SODIUM 40 MG/1
40 TABLET, DELAYED RELEASE ORAL
Qty: 90 TABLET | Refills: 1 | Status: SHIPPED | OUTPATIENT
Start: 2024-11-18

## 2024-12-05 ENCOUNTER — APPOINTMENT (OUTPATIENT)
Dept: PRIMARY CARE | Facility: CLINIC | Age: 54
End: 2024-12-05
Payer: COMMERCIAL

## 2024-12-05 VITALS
HEIGHT: 69 IN | HEART RATE: 74 BPM | TEMPERATURE: 95.9 F | BODY MASS INDEX: 32.44 KG/M2 | WEIGHT: 219 LBS | SYSTOLIC BLOOD PRESSURE: 135 MMHG | DIASTOLIC BLOOD PRESSURE: 80 MMHG

## 2024-12-05 DIAGNOSIS — E11.9 CONTROLLED TYPE 2 DIABETES MELLITUS WITHOUT COMPLICATION, WITHOUT LONG-TERM CURRENT USE OF INSULIN (MULTI): ICD-10-CM

## 2024-12-05 DIAGNOSIS — I25.10 ATHEROSCLEROSIS OF NATIVE CORONARY ARTERY OF NATIVE HEART WITHOUT ANGINA PECTORIS: ICD-10-CM

## 2024-12-05 DIAGNOSIS — J84.9 INTERSTITIAL LUNG DISEASE (MULTI): Primary | ICD-10-CM

## 2024-12-05 DIAGNOSIS — I10 ESSENTIAL HYPERTENSION: ICD-10-CM

## 2024-12-05 DIAGNOSIS — E78.2 MIXED HYPERLIPIDEMIA: ICD-10-CM

## 2024-12-05 DIAGNOSIS — E03.9 ACQUIRED HYPOTHYROIDISM: ICD-10-CM

## 2024-12-05 PROCEDURE — 99213 OFFICE O/P EST LOW 20 MIN: CPT | Performed by: INTERNAL MEDICINE

## 2024-12-05 PROCEDURE — 3075F SYST BP GE 130 - 139MM HG: CPT | Performed by: INTERNAL MEDICINE

## 2024-12-05 PROCEDURE — 3079F DIAST BP 80-89 MM HG: CPT | Performed by: INTERNAL MEDICINE

## 2024-12-05 PROCEDURE — 3008F BODY MASS INDEX DOCD: CPT | Performed by: INTERNAL MEDICINE

## 2024-12-05 PROCEDURE — 1036F TOBACCO NON-USER: CPT | Performed by: INTERNAL MEDICINE

## 2024-12-05 RX ORDER — INSULIN GLARGINE 100 [IU]/ML
25 INJECTION, SOLUTION SUBCUTANEOUS NIGHTLY
Qty: 22.5 ML | Refills: 3 | Status: SHIPPED | OUTPATIENT
Start: 2024-12-05 | End: 2025-12-05

## 2024-12-05 RX ORDER — LEVOTHYROXINE SODIUM 50 UG/1
50 TABLET ORAL DAILY
Qty: 90 TABLET | Refills: 3 | Status: SHIPPED | OUTPATIENT
Start: 2024-12-05

## 2024-12-05 RX ORDER — CARVEDILOL 25 MG/1
25 TABLET ORAL
Qty: 180 TABLET | Refills: 3 | Status: SHIPPED | OUTPATIENT
Start: 2024-12-05

## 2024-12-05 RX ORDER — PANTOPRAZOLE SODIUM 40 MG/1
40 TABLET, DELAYED RELEASE ORAL
Qty: 90 TABLET | Refills: 3 | Status: CANCELLED | OUTPATIENT
Start: 2024-12-05

## 2024-12-05 RX ORDER — ATORVASTATIN CALCIUM 20 MG/1
20 TABLET, FILM COATED ORAL DAILY
Qty: 90 TABLET | Refills: 3 | Status: SHIPPED | OUTPATIENT
Start: 2024-12-05

## 2024-12-05 ASSESSMENT — ENCOUNTER SYMPTOMS
WEIGHT GAIN: 1
MUSCULOSKELETAL NEGATIVE: 1
NAUSEA: 0
WOUND: 0
FATIGUE: 1
DIZZINESS: 0
NERVOUS/ANXIOUS: 0
CARDIOVASCULAR NEGATIVE: 1
RESPIRATORY NEGATIVE: 1
DEPRESSED MOOD: 0
ABDOMINAL DISTENTION: 0

## 2024-12-05 NOTE — PROGRESS NOTES
Subjective   Patient ID: Walker Cuenca IV is a 54 y.o. male who presents for Follow-up (3 mo fu).  Thyroid Problem  Presents for follow-up visit. Symptoms include fatigue and weight gain. Patient reports no anxiety, cold intolerance or depressed mood. The symptoms have been stable.     GI Problem  The primary symptoms include fatigue. Primary symptoms do not include fever, abdominal pain, nausea, diarrhea, hematochezia, myalgias or arthralgias. Primary symptoms comment: Liver transplant. The onset was gradual. The problem has been gradually improving.   Significant associated medical issues include liver disease. Associated medical issues do not include gallstones. Has liver transplant and stays on immunosuppression and followed at [facility].    Heart Problem  This is a chronic problem. The current episode started more than 1 year ago. The problem occurs rarely. The problem has been resolved. Pertinent negatives include no abdominal pain, anorexia, arthralgias, congestion, headaches, nausea, numbness or weakness. The treatment provided significant relief. Has occasional chest pains. No real issues.  Diabetes  He presents for his follow-up diabetic visit. He has type 2 diabetes mellitus. His disease course has been stable. Pertinent negatives for hypoglycemia include no dizziness or headaches. There are no diabetic associated symptoms. Pertinent negatives for diabetes include no weakness. Symptoms are stable. Diabetic complications include heart disease. Risk factors for coronary artery disease include diabetes mellitus. There is no change in his home blood glucose trend. An ACE inhibitor/angiotensin II receptor blocker is not being taken. Sugars are poorly controlled lately, now on lantus and mounjaro.  Past Medical History  Past Medical History:   Diagnosis Date    GERD (gastroesophageal reflux disease)        Social History  Social History     Tobacco Use    Smoking status: Former     Current packs/day: 0.00     Types:  Cigarettes     Quit date: 2010     Years since quittin.9    Smokeless tobacco: Never   Vaping Use    Vaping status: Never Used   Substance Use Topics    Alcohol use: Not Currently    Drug use: Never       Family History   No family history on file.    Allergies:  No Known Allergies     Outpatient Medications:  Current Outpatient Medications   Medication Instructions    albuterol 90 mcg/actuation inhaler USE 1-2 PUFFS EVERY 4 HOURS AS NEEDED    aspirin 81 mg, oral, Daily RT    atorvastatin (LIPITOR) 20 mg, oral, Daily    carvedilol (COREG) 25 mg, oral, 2 times daily (morning and late afternoon)    Lantus Solostar U-100 Insulin 16 Units, subcutaneous, Nightly, Take as directed per insulin instructions.    levothyroxine (SYNTHROID, LEVOXYL) 50 mcg, oral, Daily    Nitrostat 0.4 mg SL tablet sublingual    OneTouch Ultra Test strip 1 strip, miscellaneous, 2 times daily    sulfamethoxazole-trimethoprim (Bactrim DS) 800-160 mg tablet 1 tablet, oral, 3 times weekly, ,  and     tacrolimus (PROGRAF) 5 mg, oral, 2 times daily    Veltassa 8.4 g, oral, Daily RT        Review of Systems   Constitutional:  Positive for fatigue and weight gain.        Wt gain   Respiratory: Negative.     Cardiovascular: Negative.    Gastrointestinal:  Negative for abdominal distention and nausea.   Endocrine: Negative for cold intolerance.   Musculoskeletal: Negative.    Skin:  Negative for wound.   Neurological:  Negative for dizziness.   Psychiatric/Behavioral:  The patient is not nervous/anxious.          Objective       Physical Exam  Vitals reviewed.   Constitutional:       Appearance: Normal appearance. He is obese.   HENT:      Head: Normocephalic and atraumatic.   Eyes:      Conjunctiva/sclera: Conjunctivae normal.   Cardiovascular:      Rate and Rhythm: Normal rate and regular rhythm.      Pulses: Normal pulses.   Pulmonary:      Effort: Pulmonary effort is normal.      Breath sounds: Normal breath sounds.  "  Abdominal:      General: Abdomen is protuberant. There is no distension.      Palpations: Abdomen is soft.   Musculoskeletal:         General: Normal range of motion.      Cervical back: Normal range of motion.   Skin:     General: Skin is warm and dry.   Neurological:      General: No focal deficit present.   Psychiatric:         Mood and Affect: Mood normal.     /80 (BP Location: Left arm, Patient Position: Sitting, BP Cuff Size: Adult)   Pulse 74   Temp 35.5 °C (95.9 °F) (Temporal)   Ht 1.753 m (5' 9\")   Wt 99.3 kg (219 lb)   BMI 32.34 kg/m²      Assessment/Plan   Problem List Items Addressed This Visit       Atherosclerotic heart disease of native coronary artery without angina pectoris    Controlled type 2 diabetes mellitus without complication, without long-term current use of insulin (Multi)    Mixed hyperlipidemia    Essential hypertension    Hypothyroidism    Interstitial lung disease (Multi) - Primary   He has been followed at [facility] transplant, GGT level went up today, hemoglobin A1c has been out of ordinary, blood sugars are out of ordinary, they are increasing Lantus, now once patient goes to [facility] did take total care of the patient and that is what happened, I used to manage his diabetes and now [facility] is doing it, they started Mounjaro, patient needs insulin and Premeal insulin, suddenly blood sugars are out of control, he is not on any prednisone, he remains on tacrolimus.  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  He used to have a high triglycerides, liver transplant has been successful major for several months now, he is tired, he is back to work, he is working full time, his laboratories from [facility] related to the transplant were reviewed, he remains on antibacterial prophylaxis, no other orders were " placed today, make sure that blood sugars needs to get better ASAP, continuous glucose monitoring device has been applied, follow-up in 3 months.

## 2024-12-14 DIAGNOSIS — I10 ESSENTIAL HYPERTENSION: ICD-10-CM

## 2024-12-15 RX ORDER — CARVEDILOL 25 MG/1
25 TABLET ORAL
Qty: 90 TABLET | Refills: 0 | Status: SHIPPED | OUTPATIENT
Start: 2024-12-15

## 2025-01-24 DIAGNOSIS — I10 ESSENTIAL HYPERTENSION: ICD-10-CM

## 2025-01-24 RX ORDER — CARVEDILOL 25 MG/1
25 TABLET ORAL
Qty: 90 TABLET | Refills: 0 | Status: SHIPPED | OUTPATIENT
Start: 2025-01-24

## 2025-02-12 ENCOUNTER — DOCUMENTATION (OUTPATIENT)
Dept: CARE COORDINATION | Age: 55
End: 2025-02-12
Payer: COMMERCIAL

## 2025-02-12 ENCOUNTER — PATIENT OUTREACH (OUTPATIENT)
Dept: CARE COORDINATION | Age: 55
End: 2025-02-12
Payer: COMMERCIAL

## 2025-02-12 DIAGNOSIS — I10 ESSENTIAL HYPERTENSION: Primary | ICD-10-CM

## 2025-02-12 NOTE — PROGRESS NOTES
MMO/RPM referral: Spoke with patient regarding remote patient monitoring, patient enrolled in program. Device delivery scheduled for 2/17.

## 2025-02-13 ENCOUNTER — PATIENT OUTREACH (OUTPATIENT)
Dept: CARE COORDINATION | Age: 55
End: 2025-02-13
Payer: COMMERCIAL

## 2025-02-13 NOTE — PROGRESS NOTES
MMO/RPM pt enrolled. Equipment delivery date 2/17. Paramedic to call in am for time to deliver. On EMS schedule. Call schedule created. Enrolled in [facility]. Welcome letter sent.

## 2025-02-17 ENCOUNTER — PATIENT OUTREACH (OUTPATIENT)
Dept: CARE COORDINATION | Age: 55
End: 2025-02-17
Payer: COMMERCIAL

## 2025-02-17 NOTE — PROGRESS NOTES
Spoke to patient, first call, reviewed providers, vaccination status, medication classification, and medical history. Reviewed program goals and set goals for 12 wk program, New equipment delivered 2/17/25, scale&BPcuff. On EMS schedule.

## 2025-02-18 ENCOUNTER — PATIENT OUTREACH (OUTPATIENT)
Dept: CARE COORDINATION | Age: 55
End: 2025-02-18
Payer: COMMERCIAL

## 2025-02-18 NOTE — PROGRESS NOTES
2/18 rec'd message from medic, unable to set up equipment, emailed pt to send VS results via email or text

## 2025-02-20 ENCOUNTER — PATIENT OUTREACH (OUTPATIENT)
Dept: CARE COORDINATION | Age: 55
End: 2025-02-20
Payer: COMMERCIAL

## 2025-02-20 VITALS — DIASTOLIC BLOOD PRESSURE: 96 MMHG | WEIGHT: 206 LBS | BODY MASS INDEX: 30.42 KG/M2 | SYSTOLIC BLOOD PRESSURE: 152 MMHG

## 2025-02-23 ENCOUNTER — PATIENT OUTREACH (OUTPATIENT)
Dept: CARE COORDINATION | Age: 55
End: 2025-02-23
Payer: COMMERCIAL

## 2025-02-23 VITALS
HEART RATE: 88 BPM | DIASTOLIC BLOOD PRESSURE: 86 MMHG | BODY MASS INDEX: 30.45 KG/M2 | SYSTOLIC BLOOD PRESSURE: 143 MMHG | WEIGHT: 206.2 LBS

## 2025-02-27 ENCOUNTER — PATIENT OUTREACH (OUTPATIENT)
Dept: CARE COORDINATION | Age: 55
End: 2025-02-27
Payer: COMMERCIAL

## 2025-02-27 VITALS
HEART RATE: 89 BPM | WEIGHT: 208.6 LBS | SYSTOLIC BLOOD PRESSURE: 128 MMHG | DIASTOLIC BLOOD PRESSURE: 81 MMHG | BODY MASS INDEX: 30.8 KG/M2

## 2025-03-03 ENCOUNTER — PATIENT OUTREACH (OUTPATIENT)
Dept: CARE COORDINATION | Age: 55
End: 2025-03-03
Payer: COMMERCIAL

## 2025-03-03 NOTE — PROGRESS NOTES
Discussed cardiac circulation, disease processes, risk factors of smoking/salt. Athero/Arteriosclerosi, treatments, medications. Educational materials,CAD/HTN/Bld thnr, Pt voiced understanding, 2 MD appts this week. Vital signs stable.    Methotrexate Counseling:  Patient counseled regarding adverse effects of methotrexate including but not limited to nausea, vomiting, abnormalities in liver function tests. Patients may develop mouth sores, rash, diarrhea, and abnormalities in blood counts. The patient understands that monitoring is required including LFT's and blood counts.  There is a rare possibility of scarring of the liver and lung problems that can occur when taking methotrexate. Persistent nausea, loss of appetite, pale stools, dark urine, cough, and shortness of breath should be reported immediately. Patient advised to discontinue methotrexate treatment at least three months before attempting to become pregnant.  I discussed the need for folate supplements while taking methotrexate.  These supplements can decrease side effects during methotrexate treatment. The patient verbalized understanding of the proper use and possible adverse effects of methotrexate.  All of the patient's questions and concerns were addressed.

## 2025-03-04 ENCOUNTER — PATIENT OUTREACH (OUTPATIENT)
Dept: CARE COORDINATION | Age: 55
End: 2025-03-04
Payer: COMMERCIAL

## 2025-03-04 VITALS
HEART RATE: 96 BPM | BODY MASS INDEX: 30.51 KG/M2 | WEIGHT: 206.6 LBS | SYSTOLIC BLOOD PRESSURE: 169 MMHG | DIASTOLIC BLOOD PRESSURE: 97 MMHG

## 2025-03-04 NOTE — PROGRESS NOTES
MD notified via Secure Chat of pts elevated BP readings. Appt scheduled for 3/6/25. Will continue to monitor

## 2025-03-05 ENCOUNTER — PATIENT OUTREACH (OUTPATIENT)
Dept: CARE COORDINATION | Age: 55
End: 2025-03-05
Payer: COMMERCIAL

## 2025-03-05 VITALS
HEART RATE: 84 BPM | SYSTOLIC BLOOD PRESSURE: 177 MMHG | BODY MASS INDEX: 31.31 KG/M2 | DIASTOLIC BLOOD PRESSURE: 102 MMHG | WEIGHT: 212 LBS

## 2025-03-05 NOTE — PROGRESS NOTES
Notified pt re: elevated BP readings, pt has MD appt tomorrow for eval but todays BP reading is elevated. Told to go to ER and educated on S/sx CVA.

## 2025-03-06 ENCOUNTER — APPOINTMENT (OUTPATIENT)
Dept: PRIMARY CARE | Facility: CLINIC | Age: 55
End: 2025-03-06
Payer: COMMERCIAL

## 2025-03-06 ENCOUNTER — PATIENT OUTREACH (OUTPATIENT)
Dept: CARE COORDINATION | Age: 55
End: 2025-03-06

## 2025-03-06 VITALS
HEART RATE: 75 BPM | WEIGHT: 219 LBS | SYSTOLIC BLOOD PRESSURE: 140 MMHG | TEMPERATURE: 96.9 F | BODY MASS INDEX: 32.44 KG/M2 | HEIGHT: 69 IN | DIASTOLIC BLOOD PRESSURE: 82 MMHG

## 2025-03-06 DIAGNOSIS — I10 ESSENTIAL HYPERTENSION: ICD-10-CM

## 2025-03-06 DIAGNOSIS — G47.33 OSA ON CPAP: ICD-10-CM

## 2025-03-06 DIAGNOSIS — Z94.4 LIVER TRANSPLANTED (MULTI): ICD-10-CM

## 2025-03-06 DIAGNOSIS — E11.9 CONTROLLED TYPE 2 DIABETES MELLITUS WITHOUT COMPLICATION, WITHOUT LONG-TERM CURRENT USE OF INSULIN (MULTI): ICD-10-CM

## 2025-03-06 DIAGNOSIS — N18.32 STAGE 3B CHRONIC KIDNEY DISEASE (MULTI): ICD-10-CM

## 2025-03-06 DIAGNOSIS — E11.40 TYPE 2 DIABETES MELLITUS WITH DIABETIC NEUROPATHY, WITH LONG-TERM CURRENT USE OF INSULIN: Primary | ICD-10-CM

## 2025-03-06 DIAGNOSIS — Z79.4 TYPE 2 DIABETES MELLITUS WITH DIABETIC NEUROPATHY, WITH LONG-TERM CURRENT USE OF INSULIN: Primary | ICD-10-CM

## 2025-03-06 DIAGNOSIS — I25.10 ATHEROSCLEROSIS OF NATIVE CORONARY ARTERY OF NATIVE HEART WITHOUT ANGINA PECTORIS: ICD-10-CM

## 2025-03-06 DIAGNOSIS — D84.9 IMMUNOSUPPRESSED STATUS: ICD-10-CM

## 2025-03-06 DIAGNOSIS — K21.9 GASTROESOPHAGEAL REFLUX DISEASE, UNSPECIFIED WHETHER ESOPHAGITIS PRESENT: ICD-10-CM

## 2025-03-06 PROBLEM — J84.9 INTERSTITIAL LUNG DISEASE (MULTI): Status: RESOLVED | Noted: 2024-12-05 | Resolved: 2025-03-06

## 2025-03-06 PROCEDURE — 4010F ACE/ARB THERAPY RXD/TAKEN: CPT | Performed by: INTERNAL MEDICINE

## 2025-03-06 PROCEDURE — 3077F SYST BP >= 140 MM HG: CPT | Performed by: INTERNAL MEDICINE

## 2025-03-06 PROCEDURE — 3008F BODY MASS INDEX DOCD: CPT | Performed by: INTERNAL MEDICINE

## 2025-03-06 PROCEDURE — 99214 OFFICE O/P EST MOD 30 MIN: CPT | Performed by: INTERNAL MEDICINE

## 2025-03-06 PROCEDURE — 3079F DIAST BP 80-89 MM HG: CPT | Performed by: INTERNAL MEDICINE

## 2025-03-06 PROCEDURE — 1036F TOBACCO NON-USER: CPT | Performed by: INTERNAL MEDICINE

## 2025-03-06 RX ORDER — INSULIN GLARGINE 100 [IU]/ML
35 INJECTION, SOLUTION SUBCUTANEOUS NIGHTLY
Status: SHIPPED
Start: 2025-03-06 | End: 2026-03-06

## 2025-03-06 RX ORDER — TIRZEPATIDE 5 MG/.5ML
5 INJECTION, SOLUTION SUBCUTANEOUS
COMMUNITY
Start: 2025-03-01

## 2025-03-06 RX ORDER — LISINOPRIL 20 MG/1
20 TABLET ORAL
COMMUNITY
Start: 2025-03-06 | End: 2025-06-04

## 2025-03-06 RX ORDER — INSULIN LISPRO 100 [IU]/ML
INJECTION, SOLUTION INTRAVENOUS; SUBCUTANEOUS
COMMUNITY

## 2025-03-06 ASSESSMENT — ENCOUNTER SYMPTOMS
ARTHRALGIAS: 1
WOUND: 0
LOSS OF SENSATION IN FEET: 0
RESPIRATORY NEGATIVE: 1
DIZZINESS: 0
CARDIOVASCULAR NEGATIVE: 1
APPETITE CHANGE: 0
FATIGUE: 1
OCCASIONAL FEELINGS OF UNSTEADINESS: 0
DEPRESSION: 0
DIARRHEA: 0
ROS GI COMMENTS: HEARTBURN
NAUSEA: 0
ABDOMINAL PAIN: 0

## 2025-03-06 ASSESSMENT — PATIENT HEALTH QUESTIONNAIRE - PHQ9
2. FEELING DOWN, DEPRESSED OR HOPELESS: NOT AT ALL
1. LITTLE INTEREST OR PLEASURE IN DOING THINGS: NOT AT ALL
SUM OF ALL RESPONSES TO PHQ9 QUESTIONS 1 AND 2: 0

## 2025-03-06 ASSESSMENT — COLUMBIA-SUICIDE SEVERITY RATING SCALE - C-SSRS
6. HAVE YOU EVER DONE ANYTHING, STARTED TO DO ANYTHING, OR PREPARED TO DO ANYTHING TO END YOUR LIFE?: NO
1. IN THE PAST MONTH, HAVE YOU WISHED YOU WERE DEAD OR WISHED YOU COULD GO TO SLEEP AND NOT WAKE UP?: NO
2. HAVE YOU ACTUALLY HAD ANY THOUGHTS OF KILLING YOURSELF?: NO

## 2025-03-06 NOTE — PROGRESS NOTES
Subjective   Patient ID: Walker Cuenca IV is a 54 y.o. male who presents for Follow-up (3 mo fu).  HPI  GI Problem  The primary symptoms include fatigue. Primary symptoms do not include fever, abdominal pain, nausea, diarrhea, hematochezia, myalgias or arthralgias. Primary symptoms comment: Liver transplant. The onset was gradual. The problem has been gradually improving.   Significant associated medical issues include liver disease. Associated medical issues do not include gallstones. Has liver transplant and stays on immunosuppression and followed at [facility]. Has Liver transplant and now going for 3rd year, transplant related data revd. Has CKD also.    Heart Problem  This is a chronic problem. The current episode started more than 1 year ago. The problem occurs rarely. The problem has been resolved. Pertinent negatives include no abdominal pain, anorexia, arthralgias, congestion, headaches, nausea, numbness or weakness. The treatment provided significant relief. Has occasional chest pains. No real issues.  Diabetes  He presents for his follow-up diabetic visit. He has type 2 diabetes mellitus. His disease course has been stable. Pertinent negatives for hypoglycemia include no dizziness or headaches. There are no diabetic associated symptoms. Pertinent negatives for diabetes include no weakness. Symptoms are stable. Diabetic complications include heart disease. Risk factors for coronary artery disease include diabetes mellitus. There is no change in his home blood glucose trend. An ACE inhibitor/angiotensin II receptor blocker is not being taken. Sugars are poorly controlled lately, now on lantus and mounjaro.  Past Medical History  Past Medical History  Past Medical History:   Diagnosis Date    GERD (gastroesophageal reflux disease)        Social History  Social History     Tobacco Use    Smoking status: Former     Current packs/day: 0.00     Types: Cigarettes     Quit date: 1/1/2010     Years since quitting: 15.1     Smokeless tobacco: Never   Vaping Use    Vaping status: Never Used   Substance Use Topics    Alcohol use: Not Currently    Drug use: Never       Family History   No family history on file.    Allergies:  No Known Allergies     Outpatient Medications:  Current Outpatient Medications   Medication Instructions    albuterol 90 mcg/actuation inhaler USE 1-2 PUFFS EVERY 4 HOURS AS NEEDED    aspirin 81 mg, oral, Daily RT    atorvastatin (LIPITOR) 20 mg, oral, Daily    carvedilol (COREG) 25 mg, oral, 2 times daily (morning and late afternoon)    HumaLOG KwikPen Insulin 100 unit/mL pen Inject subcutaneously three times daily before meals, 8 units plus SS of 2 units for each 50>150. Total 50 units per day.    Lantus Solostar U-100 Insulin 35 Units, subcutaneous, Nightly, Take as directed per insulin instructions.    levothyroxine (SYNTHROID, LEVOXYL) 50 mcg, oral, Daily    lisinopril 20 mg, Daily RT    Mounjaro 5 mg, Once Weekly    Nitrostat 0.4 mg SL tablet sublingual    OneTouch Ultra Test strip 1 strip, miscellaneous, 2 times daily    tacrolimus (PROGRAF) 5 mg, oral, 2 times daily    Veltassa 8.4 g, oral, Daily RT        Review of Systems   Constitutional:  Positive for fatigue. Negative for appetite change.   Respiratory: Negative.     Cardiovascular: Negative.    Gastrointestinal:  Negative for abdominal pain, diarrhea and nausea.        Heartburn   Musculoskeletal:  Positive for arthralgias.   Skin:  Negative for rash and wound.   Neurological:  Negative for dizziness.         Objective       Physical Exam  Vitals reviewed.   Constitutional:       Appearance: Normal appearance. He is obese.   HENT:      Head: Normocephalic.   Eyes:      Conjunctiva/sclera: Conjunctivae normal.   Cardiovascular:      Rate and Rhythm: Normal rate and regular rhythm.      Pulses: Normal pulses.   Pulmonary:      Effort: Pulmonary effort is normal.      Breath sounds: Normal breath sounds.   Abdominal:      General: Abdomen is protuberant.  "     Palpations: Abdomen is soft.   Musculoskeletal:         General: Normal range of motion.   Skin:     General: Skin is warm and dry.   Neurological:      General: No focal deficit present.     /82 (BP Location: Left arm, Patient Position: Sitting, BP Cuff Size: Adult)   Pulse 75   Temp 36.1 °C (96.9 °F) (Temporal)   Ht 1.753 m (5' 9\")   Wt 99.3 kg (219 lb)   BMI 32.34 kg/m²      Assessment/Plan   Problem List Items Addressed This Visit       Atherosclerotic heart disease of native coronary artery without angina pectoris    Essential hypertension    ROYER on CPAP    GERD (gastroesophageal reflux disease)    Liver transplanted (Multi)    Immunosuppressed status    Type 2 diabetes mellitus with diabetic neuropathy, with long-term current use of insulin - Primary     Other Visit Diagnoses       Controlled type 2 diabetes mellitus without complication, without long-term current use of insulin (Multi)        Relevant Medications    insulin glargine (Lantus Solostar U-100 Insulin) 100 unit/mL (3 mL) pen    Stage 3b chronic kidney disease (Multi)            With a great deal of help in a great deal of work he got liver transplant about a year and a half ago and there is a remarkable improvement in his physical health, he has been followed at [facility] for liver transplant, tacrolimus levels has been done, GGT LFTs has been done, he has a periodic ERCP, his blood sugars are still not very greatly under control but better than before, he is on Premeal insulin and basal insulin and also has been on tirzepatide.  Recent endocrine follow-up was reviewed.  He continued to have a stage IIIb CKD, that values has not changed, he continue to stay compliant with CPAP.  He has history of CAD with PCI in the past, his hemoglobin has improved, he remains immunocompromise, he is working full-time, all the reports from transplant related care from [facility] were reviewed.  He does not have any breathing compromise, he does not have any " chronic cough, his BP readings are higher, lisinopril has been added he used to be on valsartan, he remains on carvedilol, he has been having increasing symptoms of acid reflux PPI has been added, all other test and information were reviewed, has not have any lipid profile done lately, he used to run high triglycerides.  He does not have any ER visit, no electrolyte disturbance, liver transplant has been well taken and also immunosuppression has been well-maintained, periodic assessment will be continued at a transplant center at [facility] and 3-month follow-up with me, all the information were relayed, his physical wellbeing was reviewed and assessed.

## 2025-03-07 ENCOUNTER — PATIENT OUTREACH (OUTPATIENT)
Dept: CARE COORDINATION | Age: 55
End: 2025-03-07
Payer: COMMERCIAL

## 2025-03-10 ENCOUNTER — PATIENT OUTREACH (OUTPATIENT)
Dept: CARE COORDINATION | Age: 55
End: 2025-03-10
Payer: COMMERCIAL

## 2025-03-10 VITALS
HEART RATE: 84 BPM | WEIGHT: 211 LBS | DIASTOLIC BLOOD PRESSURE: 96 MMHG | BODY MASS INDEX: 31.16 KG/M2 | SYSTOLIC BLOOD PRESSURE: 145 MMHG

## 2025-03-12 ENCOUNTER — PATIENT OUTREACH (OUTPATIENT)
Dept: CARE COORDINATION | Age: 55
End: 2025-03-12
Payer: COMMERCIAL

## 2025-03-12 VITALS
SYSTOLIC BLOOD PRESSURE: 144 MMHG | WEIGHT: 214 LBS | DIASTOLIC BLOOD PRESSURE: 95 MMHG | BODY MASS INDEX: 31.6 KG/M2 | HEART RATE: 79 BPM

## 2025-03-13 ENCOUNTER — PATIENT OUTREACH (OUTPATIENT)
Dept: CARE COORDINATION | Age: 55
End: 2025-03-13
Payer: COMMERCIAL

## 2025-03-13 VITALS
DIASTOLIC BLOOD PRESSURE: 100 MMHG | SYSTOLIC BLOOD PRESSURE: 168 MMHG | HEART RATE: 83 BPM | WEIGHT: 213 LBS | BODY MASS INDEX: 31.45 KG/M2

## 2025-03-14 ENCOUNTER — PATIENT OUTREACH (OUTPATIENT)
Dept: CARE COORDINATION | Age: 55
End: 2025-03-14
Payer: COMMERCIAL

## 2025-03-14 VITALS
BODY MASS INDEX: 31.45 KG/M2 | SYSTOLIC BLOOD PRESSURE: 159 MMHG | WEIGHT: 213 LBS | DIASTOLIC BLOOD PRESSURE: 94 MMHG | HEART RATE: 82 BPM

## 2025-03-14 NOTE — PROGRESS NOTES
Spoke to patient. Discussed cardiac circulation, disease processes,risk factors of smoking/salt.Athero/Arteriosclerosi, treatments,medications.Sent educational materials. Pt voiced understanding, Vital signs stable. Will continue to monitor. Dana Renner RN

## 2025-03-15 ENCOUNTER — PATIENT OUTREACH (OUTPATIENT)
Dept: CARE COORDINATION | Age: 55
End: 2025-03-15
Payer: COMMERCIAL

## 2025-03-15 VITALS
BODY MASS INDEX: 31.01 KG/M2 | DIASTOLIC BLOOD PRESSURE: 80 MMHG | HEART RATE: 89 BPM | WEIGHT: 210 LBS | SYSTOLIC BLOOD PRESSURE: 116 MMHG

## 2025-03-18 ENCOUNTER — PATIENT OUTREACH (OUTPATIENT)
Dept: CARE COORDINATION | Age: 55
End: 2025-03-18
Payer: COMMERCIAL

## 2025-03-18 VITALS
SYSTOLIC BLOOD PRESSURE: 134 MMHG | DIASTOLIC BLOOD PRESSURE: 93 MMHG | HEART RATE: 88 BPM | BODY MASS INDEX: 31.16 KG/M2 | WEIGHT: 211 LBS

## 2025-03-20 ENCOUNTER — PATIENT OUTREACH (OUTPATIENT)
Dept: CARE COORDINATION | Age: 55
End: 2025-03-20
Payer: COMMERCIAL

## 2025-03-20 VITALS
WEIGHT: 211 LBS | HEART RATE: 88 BPM | SYSTOLIC BLOOD PRESSURE: 117 MMHG | BODY MASS INDEX: 31.16 KG/M2 | DIASTOLIC BLOOD PRESSURE: 85 MMHG

## 2025-03-24 ENCOUNTER — PATIENT OUTREACH (OUTPATIENT)
Dept: CARE COORDINATION | Age: 55
End: 2025-03-24
Payer: COMMERCIAL

## 2025-03-24 NOTE — PROGRESS NOTES
Spoke to patient. Discussed SBP/DBP, blood thinners, bleeding precautions, orthostatic hypotension, calcium scoring. Pt voiced understanding, VSS will continue to monitor. Dana Renner RN

## 2025-03-28 ENCOUNTER — TELEMEDICINE (OUTPATIENT)
Dept: PHARMACY | Facility: HOSPITAL | Age: 55
End: 2025-03-28
Payer: COMMERCIAL

## 2025-03-28 DIAGNOSIS — E11.9 CONTROLLED TYPE 2 DIABETES MELLITUS WITHOUT COMPLICATION, WITHOUT LONG-TERM CURRENT USE OF INSULIN: Primary | ICD-10-CM

## 2025-03-28 DIAGNOSIS — E11.9 CONTROLLED TYPE 2 DIABETES MELLITUS WITHOUT COMPLICATION, WITHOUT LONG-TERM CURRENT USE OF INSULIN: ICD-10-CM

## 2025-03-28 NOTE — PROGRESS NOTES
Clinical Pharmacy Visit    Walker Cuenca IV is a 54 y.o. male was referred to Clinical Pharmacy Team to complete a post-discharge medication optimization and monitoring visit. The patient was referred for remote patient monitoring in the [facility] program.       Referring Provider: Aftab Ramirez MD  PCP: Jackson Damon MD - last visit: 3/6, next visit: 6/6      Subjective   No Known Allergies    [id number] - [place], [place] - [address]  [address] [address]  Phone: [phone] Fax: [phone]    [id number] - [place], [place] - [address]  [address] [address]  Phone: [phone] Fax: [phone]    [facility] - [place], [place] - [address] [place] [address]  [address] [facility] [address]  Phone: [phone] Fax: [phone]      Medication System Management:  Affordability/Accessibility: no issues  Adherence/Organization: some concern      Social History     Social History Narrative    Not on file          HPI      Review of Systems        Objective     There were no vitals taken for this visit.   BP Readings from Last 4 Encounters:   03/20/25 117/85   03/18/25 (!) 134/93   03/15/25 116/80   03/13/25 (!) 159/94      There were no vitals filed for this visit.     LAB  Lab Results   Component Value Date    BILITOT 0.9 06/06/2024    CALCIUM 9.7 06/06/2024    CO2 22 06/06/2024     (H) 06/06/2024    CREATININE 1.59 (H) 06/06/2024    GLUCOSE 156 (H) 06/06/2024    ALKPHOS 108 06/06/2024    K 5.1 06/06/2024    PROT 6.6 06/06/2024     (L) 06/06/2024    AST 16 06/06/2024    ALT 17 06/06/2024    BUN 38 (H) 06/06/2024    ANIONGAP 10 06/06/2024    MG CANCELED 09/16/2023    PHOS CANCELED 09/16/2023     (H) 01/26/2022     08/08/2023    LDH CANCELED 08/08/2023    ALBUMIN 4.1 06/06/2024    AMYLASE 43 07/03/2023    LIPASE 147 (H) 08/07/2023    GFRF CANCELED 09/16/2023    GFRMALE 49 (A) 09/26/2023     Lab  Results   Component Value Date    TRIG 70 2023    CHOL 71 2023    HDL 31.3 (A) 2023     Lab Results   Component Value Date    HGBA1C 7 (A) 2025         Current Outpatient Medications   Medication Instructions    albuterol 90 mcg/actuation inhaler USE 1-2 PUFFS EVERY 4 HOURS AS NEEDED    aspirin 81 mg, oral, Daily RT    atorvastatin (LIPITOR) 20 mg, oral, Daily    carvedilol (COREG) 25 mg, oral, 2 times daily (morning and late afternoon)    HumaLOG KwikPen Insulin 100 unit/mL pen Inject subcutaneously three times daily before meals, 8 units plus SS of 2 units for each 50>150. Total 50 units per day.    Lantus Solostar U-100 Insulin 35 Units, subcutaneous, Nightly, Take as directed per insulin instructions.    levothyroxine (SYNTHROID, LEVOXYL) 50 mcg, oral, Daily    lisinopril 20 mg, Daily RT    Mounjaro 5 mg, Once Weekly    Nitrostat 0.4 mg SL tablet sublingual    OneTouch Ultra Test strip 1 strip, miscellaneous, 2 times daily    tacrolimus (PROGRAF) 5 mg, oral, 2 times daily    Veltassa 8.4 g, oral, Daily RT        HISTORICAL PHARMACOTHERAPY      DRUG INTERACTIONS  None at time of review      Assessment/Plan   Problem List Items Addressed This Visit    None  Visit Diagnoses       Controlled type 2 diabetes mellitus without complication, without long-term current use of insulin (Multi)              Spoke with patient regarding  Patient Assistance Program as patient is currently paying $30/mo for Mounjaro, $30/mo Lantus and $35/mo for Humalog.     Unfortunately, patient does not qualify due to being over the allotted income criteria.     Reports AM fasting sugar today 140, post-prandial sugar 199. He is followed by endocrinology @ [facility].     Reports he has been using insulin that  > 1 year ago --> patient states insulin is expensive and he is tired of paying for it. Discussed consequences of using  insulin products and advised patient to discuss with endocrinology.     He also  has not taken Mounjaro in 2 weeks due to procedure. Reports he does not like taking Mounjaro as it causes sensation of being overfull and having issues with digestion/belching. Also reports sugars have been more controlled since being off Mounjaro. Again, strongly advised he contact his endocrinologist asap to discuss.     Advised patient to call [facility] with any further questions or if change in income.    Follow Up: as needed    Continue all meds under the continuation of care with the referring provider and clinical pharmacy team.    Tobi Porras, [place]     Verbal consent to manage patient's drug therapy was obtained from the patient. They were informed they may decline to participate or withdraw from participation in pharmacy services at any time.

## 2025-04-03 ENCOUNTER — PATIENT OUTREACH (OUTPATIENT)
Dept: CARE COORDINATION | Age: 55
End: 2025-04-03
Payer: COMMERCIAL

## 2025-04-03 NOTE — PROGRESS NOTES
"MD acknowledged and responded to notification of elevated BP readings vis Secure Chat.  \"OK\". Will continue to monitor.   "

## 2025-04-07 ENCOUNTER — PATIENT OUTREACH (OUTPATIENT)
Dept: CARE COORDINATION | Age: 55
End: 2025-04-07
Payer: COMMERCIAL

## 2025-04-07 NOTE — PROGRESS NOTES
Spoke to patient. Education complete. Reviewed program goals and education. Sent LARK information in preparation for discharge. VSS WNL will continue to monitor. Dana Renner RN

## 2025-04-14 ENCOUNTER — PATIENT OUTREACH (OUTPATIENT)
Dept: CARE COORDINATION | Age: 55
End: 2025-04-14
Payer: COMMERCIAL

## 2025-04-14 NOTE — PROGRESS NOTES
Spoke to patient. Education complete. Program goals and education complete. Discharge today. Last vital sign reading 4/6/2025. Declines LARK. Equipment to be PU by paramedic 4/16/25, anytime after 4pm. Dana Renner RN

## 2025-06-06 ENCOUNTER — APPOINTMENT (OUTPATIENT)
Dept: PRIMARY CARE | Facility: CLINIC | Age: 55
End: 2025-06-06
Payer: COMMERCIAL

## 2025-06-06 VITALS
HEART RATE: 68 BPM | HEIGHT: 69 IN | TEMPERATURE: 96.6 F | SYSTOLIC BLOOD PRESSURE: 106 MMHG | DIASTOLIC BLOOD PRESSURE: 78 MMHG | BODY MASS INDEX: 31.84 KG/M2 | WEIGHT: 215 LBS

## 2025-06-06 DIAGNOSIS — I10 ESSENTIAL HYPERTENSION: Primary | ICD-10-CM

## 2025-06-06 DIAGNOSIS — I25.10 ATHEROSCLEROSIS OF NATIVE CORONARY ARTERY OF NATIVE HEART WITHOUT ANGINA PECTORIS: ICD-10-CM

## 2025-06-06 DIAGNOSIS — Z94.4 LIVER TRANSPLANTED (MULTI): ICD-10-CM

## 2025-06-06 DIAGNOSIS — Z79.4 TYPE 2 DIABETES MELLITUS WITH DIABETIC NEUROPATHY, WITH LONG-TERM CURRENT USE OF INSULIN: ICD-10-CM

## 2025-06-06 DIAGNOSIS — J45.20 MILD INTERMITTENT ASTHMA WITHOUT COMPLICATION (HHS-HCC): ICD-10-CM

## 2025-06-06 DIAGNOSIS — G47.33 OSA ON CPAP: ICD-10-CM

## 2025-06-06 DIAGNOSIS — D84.9 IMMUNOSUPPRESSED STATUS: ICD-10-CM

## 2025-06-06 DIAGNOSIS — E11.40 TYPE 2 DIABETES MELLITUS WITH DIABETIC NEUROPATHY, WITH LONG-TERM CURRENT USE OF INSULIN: ICD-10-CM

## 2025-06-06 PROCEDURE — 4010F ACE/ARB THERAPY RXD/TAKEN: CPT | Performed by: INTERNAL MEDICINE

## 2025-06-06 PROCEDURE — 3074F SYST BP LT 130 MM HG: CPT | Performed by: INTERNAL MEDICINE

## 2025-06-06 PROCEDURE — 3078F DIAST BP <80 MM HG: CPT | Performed by: INTERNAL MEDICINE

## 2025-06-06 PROCEDURE — 99214 OFFICE O/P EST MOD 30 MIN: CPT | Performed by: INTERNAL MEDICINE

## 2025-06-06 PROCEDURE — 3008F BODY MASS INDEX DOCD: CPT | Performed by: INTERNAL MEDICINE

## 2025-06-06 PROCEDURE — 1036F TOBACCO NON-USER: CPT | Performed by: INTERNAL MEDICINE

## 2025-06-06 RX ORDER — NITROGLYCERIN 0.4 MG/1
TABLET SUBLINGUAL
Qty: 100 TABLET | Refills: 1 | Status: SHIPPED | OUTPATIENT
Start: 2025-06-06

## 2025-06-06 RX ORDER — NITROGLYCERIN 0.4 MG/1
0.4 TABLET SUBLINGUAL EVERY 5 MIN PRN
Qty: 90 TABLET | Refills: 1 | Status: SHIPPED | OUTPATIENT
Start: 2025-06-06 | End: 2025-06-06

## 2025-06-06 ASSESSMENT — ENCOUNTER SYMPTOMS
WEAKNESS: 0
ANOREXIA: 0
CHILLS: 0
MYALGIAS: 0
FATIGUE: 1
OCCASIONAL FEELINGS OF UNSTEADINESS: 0
CARDIOVASCULAR NEGATIVE: 1
FEVER: 0
ABDOMINAL PAIN: 0
ARTHRALGIAS: 1
DEPRESSION: 0
DIZZINESS: 0
WEIGHT LOSS: 0
LOSS OF SENSATION IN FEET: 0
DIARRHEA: 0
RESPIRATORY NEGATIVE: 1

## 2025-06-06 ASSESSMENT — PATIENT HEALTH QUESTIONNAIRE - PHQ9
SUM OF ALL RESPONSES TO PHQ9 QUESTIONS 1 AND 2: 0
2. FEELING DOWN, DEPRESSED OR HOPELESS: NOT AT ALL
1. LITTLE INTEREST OR PLEASURE IN DOING THINGS: NOT AT ALL

## 2025-06-06 NOTE — PROGRESS NOTES
Subjective   Patient ID: Walker Cuenca IV is a 54 y.o. male who presents for Follow-up (3 mo fu).  GI Problem  The primary symptoms include fatigue and arthralgias. Primary symptoms do not include fever, weight loss, abdominal pain, diarrhea or myalgias. Primary symptoms comment: Liver transplant.   The illness does not include chills or anorexia. Significant associated medical issues include liver disease.   Other  This is a chronic (Liver transplant) problem. The problem occurs intermittently. The problem has been resolved. Associated symptoms include arthralgias and fatigue. Pertinent negatives include no abdominal pain, anorexia, chills, fever, myalgias or weakness. The treatment provided moderate relief.     Heart Problem  This is a chronic problem. The current episode started more than 1 year ago. The problem occurs rarely. The problem has been resolved. Pertinent negatives include no abdominal pain, anorexia, arthralgias, congestion, headaches, nausea, numbness or weakness. The treatment provided significant relief. Has occasional chest pains. No real issues.  Diabetes  He presents for his follow-up diabetic visit. He has type 2 diabetes mellitus. His disease course has been stable. Pertinent negatives for hypoglycemia include no dizziness or headaches. There are no diabetic associated symptoms. Pertinent negatives for diabetes include no weakness. Symptoms are stable. Diabetic complications include heart disease. Risk factors for coronary artery disease include diabetes mellitus. There is no change in his home blood glucose trend. An ACE inhibitor/angiotensin II receptor blocker is not being taken. Sugars are poorly controlled lately, now on lantus and mounjaro.  Past Medical History  Medical History[1]    Social History  Social History[2]    Family History   Family History[3]    Allergies:  RX Allergies[4]     Outpatient Medications:  Current Outpatient Medications   Medication Instructions    albuterol 90  mcg/actuation inhaler USE 1-2 PUFFS EVERY 4 HOURS AS NEEDED    aspirin 81 mg, Daily RT    atorvastatin (LIPITOR) 20 mg, oral, Daily    carvedilol (COREG) 25 mg, oral, 2 times daily (morning and late afternoon)    HumaLOG KwikPen Insulin 100 unit/mL pen Inject subcutaneously three times daily before meals, 8 units plus SS of 2 units for each 50>150. Total 50 units per day.    Lantus Solostar U-100 Insulin 35 Units, subcutaneous, Nightly, Take as directed per insulin instructions.    levothyroxine (SYNTHROID, LEVOXYL) 50 mcg, oral, Daily    lisinopril 20 mg, Daily RT    Mounjaro 5 mg, Once Weekly    Nitrostat 0.4 mg SL tablet Place under the tongue.    OneTouch Ultra Test strip 1 strip, miscellaneous, 2 times daily    tacrolimus (PROGRAF) 5 mg, 2 times daily    Veltassa 8.4 g, Daily RT        Review of Systems   Constitutional:  Positive for fatigue. Negative for chills, fever and weight loss.   Respiratory: Negative.     Cardiovascular: Negative.    Gastrointestinal:  Negative for abdominal pain, anorexia and diarrhea.   Musculoskeletal:  Positive for arthralgias. Negative for myalgias.   Neurological:  Negative for dizziness and weakness.         Objective       Physical Exam  Vitals reviewed.   Constitutional:       Appearance: Normal appearance. He is obese.   HENT:      Head: Normocephalic.   Eyes:      Conjunctiva/sclera: Conjunctivae normal.   Cardiovascular:      Rate and Rhythm: Normal rate and regular rhythm.   Pulmonary:      Effort: Pulmonary effort is normal.      Breath sounds: Normal breath sounds.   Abdominal:      General: There is no distension.      Palpations: Abdomen is soft. There is no mass.      Tenderness: There is no abdominal tenderness.   Musculoskeletal:         General: Normal range of motion.      Cervical back: Neck supple.   Skin:     General: Skin is warm and dry.   Neurological:      General: No focal deficit present.   Psychiatric:         Mood and Affect: Mood normal.     /78  "(BP Location: Left arm, Patient Position: Sitting, BP Cuff Size: Adult)   Pulse 68   Temp 35.9 °C (96.6 °F) (Temporal)   Ht 1.753 m (5' 9\")   Wt 97.5 kg (215 lb)   BMI 31.75 kg/m²      Assessment/Plan   Problem List Items Addressed This Visit       Atherosclerotic heart disease of native coronary artery without angina pectoris    Essential hypertension - Primary    ROYER on CPAP    Liver transplanted (Multi)    Immunosuppressed status    RESOLVED: Mild intermittent asthma without complication (HHS-HCC)    Type 2 diabetes mellitus with diabetic neuropathy, with long-term current use of insulin   Pt has ROYER, usage and compliance of CPAP mask reviewed, pt was advised to use it and stay acclimatized to usage of CPAP mask, appropriate and proper sleep related hygiene reviewed and discussed, avoid sedatives and alcohol, avoid supine sleeping. Weight loo always helps to reduce risk and dangers of untreated sleep apnea. ROYER is wide spread and undiagnosed. If not able to tolerate mask then inform us and may try alternate masks or proper fitting masks or nasal pillows. Always have humidity for air flow.  We have a problem with CPAP, he has a CPAP which has been prescribed while ago, there is a hole in the CPAP apparatus, we retrieved his sleep study from 2022, he is supposed to be on AutoPap, he is tired and fatigued so I wonder that CPAP or AutoPap is not used properly or the apparatus is faulty, he is using it every day but tiredness and fatigability could be one of the reason is a poorly treated sleep apnea although he is a liver transplant patient, his tacrolimus level GGT level was reviewed, he is rarely using Lantus, he is on Mounjaro, he did not lose much weight, he remains on the appropriate immunosuppression, no chest pains, no cough, long time ago has a PCI, anemia has improved, medications are carefully reviewed, on Mounjaro 5 mg now, potassium level was high, he did not repeat one, because of history of PCI he " remains on statin beta-blockers and aspirin.  It was a hard work to help this patient having liver transplant with repeated effort done by me in the past and unfortunately patient could not get it done at [facility] because it could not be done in a timely manner and patient would not have survived without liver transplant so it happened at [facility] and patient is doing fantastically well.  We will make sure that his CPAP supplies will be renewed if needed another sleep study could be done depends upon the [employer] company, follow-up in 3 months.       [1]   Past Medical History:  Diagnosis Date    GERD (gastroesophageal reflux disease)    [2]   Social History  Tobacco Use    Smoking status: Former     Current packs/day: 0.00     Types: Cigarettes     Quit date: 1/1/2010     Years since quitting: 15.4    Smokeless tobacco: Never   Vaping Use    Vaping status: Never Used   Substance Use Topics    Alcohol use: Not Currently    Drug use: Never   [3] No family history on file.  [4] No Known Allergies

## 2025-06-09 DIAGNOSIS — G47.33 OSA ON CPAP: ICD-10-CM

## 2025-06-11 DIAGNOSIS — D84.9 IMMUNOSUPPRESSED STATUS: ICD-10-CM

## 2025-06-11 DIAGNOSIS — N18.32 STAGE 3B CHRONIC KIDNEY DISEASE (MULTI): ICD-10-CM

## 2025-06-11 DIAGNOSIS — Z94.4 LIVER TRANSPLANTED (MULTI): ICD-10-CM

## 2025-07-11 ENCOUNTER — HOSPITAL ENCOUNTER (OUTPATIENT)
Dept: RADIOLOGY | Facility: HOSPITAL | Age: 55
Discharge: HOME | End: 2025-07-11
Payer: COMMERCIAL

## 2025-07-11 DIAGNOSIS — N18.4 CHRONIC KIDNEY DISEASE, STAGE 4 (SEVERE) (MULTI): ICD-10-CM

## 2025-07-11 PROCEDURE — 76770 US EXAM ABDO BACK WALL COMP: CPT

## 2025-09-05 ENCOUNTER — APPOINTMENT (OUTPATIENT)
Dept: PRIMARY CARE | Facility: CLINIC | Age: 55
End: 2025-09-05
Payer: COMMERCIAL

## 2025-10-02 ENCOUNTER — APPOINTMENT (OUTPATIENT)
Dept: PRIMARY CARE | Facility: CLINIC | Age: 55
End: 2025-10-02
Payer: COMMERCIAL